# Patient Record
Sex: FEMALE | Race: BLACK OR AFRICAN AMERICAN | Employment: UNEMPLOYED | URBAN - METROPOLITAN AREA
[De-identification: names, ages, dates, MRNs, and addresses within clinical notes are randomized per-mention and may not be internally consistent; named-entity substitution may affect disease eponyms.]

---

## 2017-02-10 ENCOUNTER — OFFICE VISIT (OUTPATIENT)
Dept: PAIN MANAGEMENT | Age: 42
End: 2017-02-10

## 2017-02-10 VITALS
BODY MASS INDEX: 39.4 KG/M2 | HEIGHT: 69 IN | SYSTOLIC BLOOD PRESSURE: 138 MMHG | WEIGHT: 266 LBS | HEART RATE: 98 BPM | DIASTOLIC BLOOD PRESSURE: 87 MMHG

## 2017-02-10 DIAGNOSIS — M54.41 CHRONIC MIDLINE LOW BACK PAIN WITH BILATERAL SCIATICA: Primary | ICD-10-CM

## 2017-02-10 DIAGNOSIS — M54.42 CHRONIC MIDLINE LOW BACK PAIN WITH BILATERAL SCIATICA: Primary | ICD-10-CM

## 2017-02-10 DIAGNOSIS — M25.561 CHRONIC PAIN OF BOTH KNEES: ICD-10-CM

## 2017-02-10 DIAGNOSIS — M22.42 CHONDROMALACIA PATELLAE, LEFT: ICD-10-CM

## 2017-02-10 DIAGNOSIS — G89.4 CHRONIC PAIN SYNDROME: ICD-10-CM

## 2017-02-10 DIAGNOSIS — M25.562 CHRONIC PAIN OF BOTH KNEES: ICD-10-CM

## 2017-02-10 DIAGNOSIS — G89.29 CHRONIC PAIN OF BOTH KNEES: ICD-10-CM

## 2017-02-10 DIAGNOSIS — G89.29 CHRONIC MIDLINE LOW BACK PAIN WITH BILATERAL SCIATICA: Primary | ICD-10-CM

## 2017-02-10 DIAGNOSIS — G89.29 CHRONIC PAIN OF LEFT KNEE: ICD-10-CM

## 2017-02-10 DIAGNOSIS — M25.562 CHRONIC PAIN OF LEFT KNEE: ICD-10-CM

## 2017-02-10 RX ORDER — METHOCARBAMOL 500 MG/1
500 TABLET, FILM COATED ORAL 3 TIMES DAILY
Qty: 90 TAB | Refills: 3 | Status: SHIPPED | OUTPATIENT
Start: 2017-02-10 | End: 2017-04-10 | Stop reason: SDUPTHER

## 2017-02-10 RX ORDER — TEMAZEPAM 15 MG/1
15 CAPSULE ORAL
Qty: 30 CAP | Refills: 3 | Status: SHIPPED | OUTPATIENT
Start: 2017-03-08 | End: 2017-04-10 | Stop reason: SDUPTHER

## 2017-02-10 RX ORDER — AMITRIPTYLINE HYDROCHLORIDE 25 MG/1
TABLET, FILM COATED ORAL
Qty: 30 TAB | Refills: 3 | Status: SHIPPED | OUTPATIENT
Start: 2017-03-08 | End: 2017-06-12 | Stop reason: SDUPTHER

## 2017-02-10 RX ORDER — TEMAZEPAM 15 MG/1
15 CAPSULE ORAL
Qty: 60 CAP | Refills: 3 | Status: SHIPPED | OUTPATIENT
Start: 2017-03-08 | End: 2017-02-10 | Stop reason: SDUPTHER

## 2017-02-10 NOTE — PATIENT INSTRUCTIONS
1. Continue current plan with no evidence of addiction or diversion. Stable on current medication without adverse events. 2. Refill Opana ER 20 mg 3 times daily. 3. Refill Nucynta 75 mg up to 3 times daily as needed. 4. Refill Restoril 15 mg capsule once nightly as needed for sleep. With 3 refills  5. Refill Elavil 25 mg tablet once nightly as needed. 3 refills. Do not use along with Nucynta  6. Refill Robaxin 500 mg tablet up to 3 times daily. 3 refills. 7. Previously prescribed naloxone rescue injector. 8. Discussed risks of addiction, dependency, and opioid induced hyperalgesia.    9. Return to clinic in 2 months

## 2017-02-10 NOTE — MR AVS SNAPSHOT
Visit Information Date & Time Provider Department Dept. Phone Encounter #  
 2/10/2017  8:40 AM Gabino Powell, [facility] [id number] Follow-up Instructions Return in about 2 months (around 4/10/2017). Upcoming Health Maintenance Date Due DTaP/Tdap/Td series (1 - Tdap) 11/23/1997 PAP AKA CERVICAL CYTOLOGY 11/23/1997 INFLUENZA AGE 9 TO ADULT 8/1/2016 Allergies as of 2/10/2017  Review Complete On: 2/10/2017 By: SHAGUFTA Cavazos No Known Allergies Current Immunizations  Never Reviewed No immunizations on file. Not reviewed this visit You Were Diagnosed With   
  
 Codes Comments Chronic midline low back pain with bilateral sciatica    -  Primary ICD-10-CM: M54.41, M54.42, G89.29 ICD-9-CM: 724.2, 724.3, 338.29 Chronic pain of both knees     ICD-10-CM: M25.561, M25.562, G89.29 ICD-9-CM: 719.46, 338.29 Chondromalacia patellae, left     ICD-10-CM: M22.42 
ICD-9-CM: 717.7 Chronic pain syndrome     ICD-10-CM: G89.4 ICD-9-CM: 338. 4 Chronic pain of left knee     ICD-10-CM: M25.562, G89.29 ICD-9-CM: 719.46, 338.29 Vitals BP Pulse Height(growth percentile) Weight(growth percentile) LMP BMI  
 138/87 98 5' 9\" (1.753 m) 266 lb (120.7 kg) 07/23/2016 39.28 kg/m2 OB Status Smoking Status Hysterectomy Never Smoker Vitals History BMI and BSA Data Body Mass Index Body Surface Area  
 39.28 kg/m 2 2.42 m 2 Preferred Pharmacy Pharmacy Name Phone [facility] [address] [facility] [place] [address] Your Updated Medication List  
  
   
This list is accurate as of: 2/10/17  9:17 AM.  Always use your most recent med list.  
  
  
  
  
 amitriptyline 25 mg tablet Commonly known as:  ELAVIL TAKE 1 TABLET BY MOUTH EVERY NIGHT Start taking on:  3/8/2017  
  
 bisacodyl 10 mg suppository Commonly known as:  DULCOLAX Insert 10 mg into rectum daily. enoxaparin 40 mg/0.4 mL Commonly known as:  LOVENOX  
0.4 mL by SubCUTAneous route every twenty-four (24) hours. gabapentin 300 mg capsule Commonly known as:  NEURONTIN Take 300 mg by mouth three (3) times daily. ibuprofen 600 mg tablet Commonly known as:  MOTRIN Take 1 Tab by mouth every six (6) hours as needed. losartan-hydroCHLOROthiazide 100-25 mg per tablet Commonly known as:  HYZAAR Take 1 Tab by mouth daily. methocarbamol 500 mg tablet Commonly known as:  ROBAXIN Take 1 Tab by mouth three (3) times daily for 30 days. NIFEdipine ER 30 mg ER tablet Commonly known as:  ADALAT CC Take  by mouth daily. oxyCODONE-acetaminophen 5-325 mg per tablet Commonly known as:  PERCOCET Take 1-2 Tabs by mouth every four (4) hours as needed. Max Daily Amount: 12 Tabs. * oxyMORphone 20 mg ER tablet Commonly known as:  OPANA ER Take 1 Tab by mouth every eight (8) hours for 30 days. Max Daily Amount: 60 mg.  
  
 * oxyMORphone 20 mg ER tablet Commonly known as:  OPANA ER Take 1 Tab by mouth every eight (8) hours for 30 days. Max Daily Amount: 60 mg. Start taking on:  3/12/2017 RANEXA 500 mg SR tablet Generic drug:  ranolazine ER Take 1,000 mg by mouth two (2) times a day. * tapentadol 75 mg tablet Commonly known as:  Yreka Mess Take 75 mg by mouth three (3) times daily as needed for Pain for up to 30 days. Max Daily Amount: 225 mg.  
  
 * tapentadol 75 mg tablet Commonly known as:  Yreka Mess Take 75 mg by mouth three (3) times daily as needed for Pain for up to 30 days. Max Daily Amount: 225 mg. Start taking on:  3/12/2017  
  
 temazepam 15 mg capsule Commonly known as:  RESTORIL Take 1 Cap by mouth nightly as needed for Sleep for up to 30 days. Max Daily Amount: 15 mg. Start taking on:  3/8/2017 VITAMIN D2 50,000 unit capsule Generic drug:  ergocalciferol Take 50,000 Units by mouth every seven (7) days. * Notice: This list has 4 medication(s) that are the same as other medications prescribed for you. Read the directions carefully, and ask your doctor or other care provider to review them with you. Prescriptions Printed Refills  
 oxyMORphone (OPANA ER) 20 mg PO ER tablet 0 Sig: Take 1 Tab by mouth every eight (8) hours for 30 days. Max Daily Amount: 60 mg.  
 Class: Print Route: Oral  
 oxyMORphone (OPANA ER) 20 mg PO ER tablet 0 Starting on: 3/12/2017 Sig: Take 1 Tab by mouth every eight (8) hours for 30 days. Max Daily Amount: 60 mg.  
 Class: Print Route: Oral  
 tapentadol (NUCYNTA) 75 mg tablet 0 Sig: Take 75 mg by mouth three (3) times daily as needed for Pain for up to 30 days. Max Daily Amount: 225 mg.  
 Class: Print Route: Oral  
 tapentadol (NUCYNTA) 75 mg tablet 0 Starting on: 3/12/2017 Sig: Take 75 mg by mouth three (3) times daily as needed for Pain for up to 30 days. Max Daily Amount: 225 mg.  
 Class: Print Route: Oral  
 temazepam (RESTORIL) 15 mg capsule 3 Starting on: 3/8/2017 Sig: Take 1 Cap by mouth nightly as needed for Sleep for up to 30 days. Max Daily Amount: 15 mg.  
 Class: Print Route: Oral  
  
Prescriptions Sent to Pharmacy Refills  
 amitriptyline (ELAVIL) 25 mg tablet 3 Starting on: 3/8/2017 Sig: TAKE 1 TABLET BY MOUTH EVERY NIGHT Class: Normal  
 Pharmacy: [address] [place] [address], [address] [place] [address] Ph #: [phone]  
 methocarbamol (ROBAXIN) 500 mg tablet 3 Sig: Take 1 Tab by mouth three (3) times daily for 30 days. Class: Normal  
 Pharmacy: [facility] [address] [place] [address], [address] [place] [address] Ph #: [phone] Route: Oral  
  
Follow-up Instructions Return in about 2 months (around 4/10/2017). Patient Instructions 1. Continue current plan with no evidence of addiction or diversion. Stable on current medication without adverse events. 2. Refill Opana ER 20 mg 3 times daily. 3. Refill Nucynta 75 mg up to 3 times daily as needed. 4. Refill Restoril 15 mg capsule once nightly as needed for sleep. With 3 refills 5. Refill Elavil 25 mg tablet once nightly as needed. 3 refills. Do not use along with Nucynta 6. Refill Robaxin 500 mg tablet up to 3 times daily. 3 refills. 7. Previously prescribed naloxone rescue injector. 8. Discussed risks of addiction, dependency, and opioid induced hyperalgesia. 9. Return to clinic in 2 months Introducing [facility]! [employer] introduces [employer] patient portal. Now you can access parts of your medical record, email your doctor's office, and request medication refills online. 1. In your internet browser, go to [email or web address] 2. Click on the First Time User? Click Here link in the Sign In box. You will see the New Member Sign Up page. 3. Enter your [employer] Access Code exactly as it appears below. You will not need to use this code after youve completed the sign-up process. If you do not sign up before the expiration date, you must request a new code. · [employer] Access Code: [email or web address] Expires: 5/11/2017  9:17 AM 
 
4. Enter the last four digits of your Social Security Number (xxxx) and Date of Birth (mm/dd/yyyy) as indicated and click Submit. You will be taken to the next sign-up page. 5. Create a [employer] ID. This will be your [employer] login ID and cannot be changed, so think of one that is secure and easy to remember. 6. Create a [employer] password. You can change your password at any time. 7. Enter your Password Reset Question and Answer. This can be used at a later time if you forget your password. 8. Enter your e-mail address. You will receive e-mail notification when new information is available in [address] 9. Click Sign Up. You can now view and download portions of your medical record. 10. Click the Download Summary menu link to download a portable copy of your medical information. If you have questions, please visit the Frequently Asked Questions section of the [employer] website. Remember, [employer] is NOT to be used for urgent needs. For medical emergencies, dial 911. Now available from your iPhone and Android! Please provide this summary of care documentation to your next provider. Your primary care clinician is listed as Shamar Naranjo. If you have any questions after today's visit, please call [phone].

## 2017-02-10 NOTE — PROGRESS NOTES
Nursing Notes    Patient presents to the office today in follow-up. Patient rates her pain at 8/10 on the numerical pain scale. Reviewed medications with counts as follows:    Rx Date filled Qty Dispensed Pill Count Last Dose Short   nucynta 75 mg  01/10/17 90 0 This a.m. no   opana ER 20 mg 01/10/17 90 0 This a.m. no                            POC UDS was not performed in office today    Any new labs or imaging since last appointment? NO    Have you been to an emergency room (ER) or urgent care clinic since your last visit? NO            Have you been hospitalized since your last visit? NO     If yes, where, when, and reason for visit? Have you seen or consulted any other health care providers outside of the [address] [place] [address]  since your last visit? NO     If yes, where, when, and reason for visit? HM deferred to pcp.

## 2017-02-10 NOTE — PROGRESS NOTES
HISTORY OF PRESENT ILLNESS  Charito Crane is a 37 y.o. female    HPI: Ms. Red Izquierdo  returns today for f/u of chronic left knee pain. 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 The pain started after a fall, 2010. She underwent one surgery, ACL reconstruction, around . She is doing well with current treatment. No new complaints today. We will continue with no changes. I will have her follow-up in 2 months for reassessment. She does report that she had a cyst removed in her right upper extremity. She received Percocet and says this was disclosed to our practice. I do not see a note in her chart. Extensive counseling was provided. Explained her that she must disclose all outside prescriptions to our office within 72 hours. She verbally agrees. Current medication management consists of Opana ER 20 mg every 8 hours, Nucynta IR 75 mg 3 times a day as needed, Elavil 25 mg at night, and Restoril 30 mg at night. Medications are helping with pain control and quality of life. Her pain is 6-7/10 with medication and 10/10 without. Pt describes pain as aching. Aggravating factors include standing and walking. Relieved with rest, medication, and avoiding painful activities. Current treatment is helping to improve general activity, mood, walking, sleep, enjoyment of life    She  is otherwise doing well with no other complaints today. She denies any adverse events including nausea, vomiting, dizziness, increased constipation, hallucinations, or seizures. Attended education class on 2016    PRIOR IMAGIN. Lumbar MRI 16: Multilevel degenerative disc disease and facet arthropathy. Moderate canal narrowing and moderate bilateral neural foramina at L3-L4. L4-L5 moderate bilateral neural foraminal narrowing with mild canal narrowing.        No Known Allergies    Past Surgical History   Procedure Laterality Date    Hx acl reconstruction      Hx knee arthroscopy      Hx hernia repair           Review of Systems   Constitutional: Negative for chills and fever. HENT: Negative for congestion and sore throat. Eyes: Negative for blurred vision and double vision. Respiratory: Positive for shortness of breath. Negative for cough and wheezing. Cardiovascular: Positive for chest pain and leg swelling. Negative for palpitations. Gastrointestinal: Positive for heartburn. Negative for abdominal pain, constipation, nausea and vomiting. Genitourinary: Negative for dysuria and urgency. Musculoskeletal: Positive for back pain and joint pain. Skin: Negative for itching and rash. Neurological: Positive for weakness and headaches. Negative for dizziness, seizures and loss of consciousness. Endo/Heme/Allergies: Negative for environmental allergies. Does not bruise/bleed easily. Psychiatric/Behavioral: Positive for depression. Negative for suicidal ideas. The patient is nervous/anxious and has insomnia. Physical Exam   Constitutional: She is oriented to person, place, and time and well-developed, well-nourished, and in no distress. No distress. obese   HENT:   Head: Normocephalic and atraumatic. Eyes: EOM are normal.   Neck: Normal range of motion. Pulmonary/Chest: Effort normal.   Musculoskeletal: Normal range of motion. Neurological: She is alert and oriented to person, place, and time. Gait ( antalgic gait) abnormal.   Skin: Skin is warm and dry. No rash noted. She is not diaphoretic. No erythema. Psychiatric: Mood, memory, affect and judgment normal.   Nursing note and vitals reviewed. ASSESSMENT:    1. Chronic midline low back pain with bilateral sciatica    2. Chronic pain of both knees    3. Chondromalacia patellae, left    4. Chronic pain syndrome    5.  Chronic pain of left knee           [place] Prescription Monitoring Program was reviewed which does not demonstrate aberrancies and/or inconsistencies with regard to the historical prescribing of controlled medications to this patient by other providers. NOTE: Disclosed Percocet filled 7/28/16 for 5 days for postsurgical pain. Discussed percocet filled 1/5/17 for post procedural pain. PLAN / Pt Instructions:  1. Continue current plan with no evidence of addiction or diversion. Stable on current medication without adverse events. 2. Refill Opana ER 20 mg 3 times daily. 3. Refill Nucynta 75 mg up to 3 times daily as needed. 4. Refill Restoril 15 mg capsule once nightly as needed for sleep. With 3 refills  5. Refill Elavil 25 mg tablet once nightly as needed. 3 refills. Do not use along with Nucynta  6. Refill Robaxin 500 mg tablet up to 3 times daily. 3 refills. 7. Previously prescribed naloxone rescue injector. 8. Discussed risks of addiction, dependency, and opioid induced hyperalgesia. 9. Return to clinic in 2 months     Medications Ordered Today   Medications    oxyMORphone (OPANA ER) 20 mg PO ER tablet     Sig: Take 1 Tab by mouth every eight (8) hours for 30 days. Max Daily Amount: 60 mg. Dispense:  90 Tab     Refill:  0    oxyMORphone (OPANA ER) 20 mg PO ER tablet     Sig: Take 1 Tab by mouth every eight (8) hours for 30 days. Max Daily Amount: 60 mg. Dispense:  90 Tab     Refill:  0    tapentadol (NUCYNTA) 75 mg tablet     Sig: Take 75 mg by mouth three (3) times daily as needed for Pain for up to 30 days. Max Daily Amount: 225 mg. Dispense:  90 Tab     Refill:  0    tapentadol (NUCYNTA) 75 mg tablet     Sig: Take 75 mg by mouth three (3) times daily as needed for Pain for up to 30 days. Max Daily Amount: 225 mg.      Dispense:  90 Tab     Refill:  0    amitriptyline (ELAVIL) 25 mg tablet     Sig: TAKE 1 TABLET BY MOUTH EVERY NIGHT     Dispense:  30 Tab     Refill:  3    DISCONTD: temazepam (RESTORIL) 15 mg capsule     Sig: Take 1 Cap by mouth nightly as needed for Sleep for up to 30 days. Max Daily Amount: 15 mg. Dispense:  60 Cap     Refill:  3    methocarbamol (ROBAXIN) 500 mg tablet     Sig: Take 1 Tab by mouth three (3) times daily for 30 days. Dispense:  90 Tab     Refill:  3    temazepam (RESTORIL) 15 mg capsule     Sig: Take 1 Cap by mouth nightly as needed for Sleep for up to 30 days. Max Daily Amount: 15 mg. Dispense:  30 Cap     Refill:  3       Pain medications prescribed with the objective of pain relief and improved physical and psychosocial function in this patient. Spent 25 minutes with patient today reviewing the treatment plan, goals of treatment plan, and limitations of the treatment plan, to include the potential for side effects from medications and procedures. Jonathan Frazier 2/10/2017      Note: Please excuse any typographical errors. Voice recognition software was used for this note and may cause mistakes.

## 2017-03-08 RX ORDER — NALOXONE HYDROCHLORIDE 4 MG/.1ML
4 SPRAY NASAL AS NEEDED
Qty: 1 BOX | Refills: 1 | Status: SHIPPED | OUTPATIENT
Start: 2017-03-08 | End: 2019-01-04 | Stop reason: SDUPTHER

## 2017-03-08 NOTE — TELEPHONE ENCOUNTER
The pt's pharmacy called the office about the prescription for narcan the pt was issued back in August 2016. They have still yet to get this medication approved for the pt. The pt's insurance will cover the narcan nasal spray. This was brought to the provider for review and he stated that we can try and order this for the pt.

## 2017-03-10 ENCOUNTER — DOCUMENTATION ONLY (OUTPATIENT)
Dept: PAIN MANAGEMENT | Age: 42
End: 2017-03-10

## 2017-04-10 ENCOUNTER — OFFICE VISIT (OUTPATIENT)
Dept: PAIN MANAGEMENT | Age: 42
End: 2017-04-10

## 2017-04-10 VITALS
DIASTOLIC BLOOD PRESSURE: 86 MMHG | SYSTOLIC BLOOD PRESSURE: 117 MMHG | BODY MASS INDEX: 39.28 KG/M2 | HEART RATE: 92 BPM | WEIGHT: 266 LBS

## 2017-04-10 DIAGNOSIS — M22.42 CHONDROMALACIA PATELLAE, LEFT: ICD-10-CM

## 2017-04-10 DIAGNOSIS — G89.29 CHRONIC PAIN OF BOTH KNEES: ICD-10-CM

## 2017-04-10 DIAGNOSIS — G89.4 CHRONIC PAIN SYNDROME: ICD-10-CM

## 2017-04-10 DIAGNOSIS — M25.562 CHRONIC PAIN OF LEFT KNEE: ICD-10-CM

## 2017-04-10 DIAGNOSIS — G89.29 CHRONIC PAIN OF LEFT KNEE: ICD-10-CM

## 2017-04-10 DIAGNOSIS — M25.562 CHRONIC PAIN OF BOTH KNEES: ICD-10-CM

## 2017-04-10 DIAGNOSIS — M25.561 CHRONIC PAIN OF BOTH KNEES: ICD-10-CM

## 2017-04-10 RX ORDER — TOPIRAMATE 50 MG/1
TABLET, FILM COATED ORAL 2 TIMES DAILY
COMMUNITY
End: 2019-01-04

## 2017-04-10 RX ORDER — METHOCARBAMOL 500 MG/1
500 TABLET, FILM COATED ORAL 3 TIMES DAILY
Qty: 90 TAB | Refills: 3 | Status: SHIPPED | OUTPATIENT
Start: 2017-05-10 | End: 2017-08-10 | Stop reason: SDUPTHER

## 2017-04-10 RX ORDER — TEMAZEPAM 15 MG/1
15 CAPSULE ORAL
Qty: 30 CAP | Refills: 3 | Status: SHIPPED | OUTPATIENT
Start: 2017-05-04 | End: 2017-08-10 | Stop reason: SDUPTHER

## 2017-04-10 RX ORDER — SERTRALINE HYDROCHLORIDE 50 MG/1
TABLET, FILM COATED ORAL DAILY
COMMUNITY
End: 2019-01-04

## 2017-04-10 NOTE — PATIENT INSTRUCTIONS
1. Continue current plan with no evidence of addiction or diversion. Stable on current medication without adverse events. 2. Refill Opana ER 20 mg 3 times daily. 3. Refill Nucynta 75 mg up to 3 times daily as needed. 4. Refill Restoril 15 mg capsule once nightly as needed for sleep. With 3 refills  5. Refill Elavil 25 mg tablet once nightly as needed. 3 refills. Do not use along with Nucynta  6. Refill Robaxin 500 mg tablet up to 3 times daily. 3 refills. 7. Previously prescribed naloxone rescue injector. 8. Referral to Orthopedic specialist for worsening left knee pain. (second opinion)  9. Discussed risks of addiction, dependency, and opioid induced hyperalgesia. 10. Return to clinic in 2 months           Knee Pain or Injury: Care Instructions  Your Care Instructions    Injuries are a common cause of knee problems. Sudden (acute) injuries may be caused by a direct blow to the knee. They can also be caused by abnormal twisting, bending, or falling on the knee. Pain, bruising, or swelling may be severe, and may start within minutes of the injury. Overuse is another cause of knee pain. Other causes are climbing stairs, kneeling, and other activities that use the knee. Everyday wear and tear, especially as you get older, also can cause knee pain. Rest, along with home treatment, often relieves pain and allows your knee to heal. If you have a serious knee injury, you may need tests and treatment. Follow-up care is a key part of your treatment and safety. Be sure to make and go to all appointments, and call your doctor if you are having problems. It's also a good idea to know your test results and keep a list of the medicines you take. How can you care for yourself at home? · Be safe with medicines. Read and follow all instructions on the label. ¨ If the doctor gave you a prescription medicine for pain, take it as prescribed.   ¨ If you are not taking a prescription pain medicine, ask your doctor if you can take an over-the-counter medicine. · Rest and protect your knee. Take a break from any activity that may cause pain. · Put ice or a cold pack on your knee for 10 to 20 minutes at a time. Put a thin cloth between the ice and your skin. · Prop up a sore knee on a pillow when you ice it or anytime you sit or lie down for the next 3 days. Try to keep it above the level of your heart. This will help reduce swelling. · If your knee is not swollen, you can put moist heat, a heating pad, or a warm cloth on your knee. · If your doctor recommends an elastic bandage, sleeve, or other type of support for your knee, wear it as directed. · Follow your doctor's instructions about how much weight you can put on your leg. Use a cane, crutches, or a walker as instructed. · Follow your doctor's instructions about activity during your healing process. If you can do mild exercise, slowly increase your activity. · Reach and stay at a healthy weight. Extra weight can strain the joints, especially the knees and hips, and make the pain worse. Losing even a few pounds may help. When should you call for help? Call 911 anytime you think you may need emergency care. For example, call if:  · You have symptoms of a blood clot in your lung (called a pulmonary embolism). These may include:  ¨ Sudden chest pain. ¨ Trouble breathing. ¨ Coughing up blood. Call your doctor now or seek immediate medical care if:  · You have severe or increasing pain. · Your leg or foot turns cold or changes color. · You cannot stand or put weight on your knee. · Your knee looks twisted or bent out of shape. · You cannot move your knee. · You have signs of infection, such as:  ¨ Increased pain, swelling, warmth, or redness. ¨ Red streaks leading from the knee. ¨ Pus draining from a place on your knee. ¨ A fever.   · You have signs of a blood clot in your leg (called a deep vein thrombosis), such as:  ¨ Pain in your calf, back of the knee, thigh, or groin. ¨ Redness and swelling in your leg or groin. Watch closely for changes in your health, and be sure to contact your doctor if:  · You have tingling, weakness, or numbness in your knee. · You have any new symptoms, such as swelling. · You have bruises from a knee injury that last longer than 2 weeks. · You do not get better as expected. Where can you learn more? Go to http://delgado-carlota.info/. Enter K195 in the search box to learn more about \"Knee Pain or Injury: Care Instructions. \"  Current as of: May 27, 2016  Content Version: 11.2  © 4925-9927 [employer]. Care instructions adapted under license by [employer] (which disclaims liability or warranty for this information). If you have questions about a medical condition or this instruction, always ask your healthcare professional. [place] [address] any warranty or liability for your use of this information.

## 2017-04-10 NOTE — MR AVS SNAPSHOT
Visit Information Date & Time Provider Department Dept. Phone Encounter #  
 4/10/2017  8:40 AM SHAGUFTA Dunlap [address] [facility] [phone] Follow-up Instructions Return in about 2 months (around 6/10/2017). Upcoming Health Maintenance Date Due DTaP/Tdap/Td series (1 - Tdap) 11/23/1997 PAP AKA CERVICAL CYTOLOGY 11/23/1997 INFLUENZA AGE 9 TO ADULT 8/1/2016 Allergies as of 4/10/2017  Review Complete On: 4/10/2017 By: SHAGUFTA Dunlap No Known Allergies Current Immunizations  Never Reviewed No immunizations on file. Not reviewed this visit You Were Diagnosed With   
  
 Codes Comments Chronic pain of left knee     ICD-10-CM: M25.562, G89.29 ICD-9-CM: 719.46, 338.29 Chronic pain syndrome     ICD-10-CM: G89.4 ICD-9-CM: 338. 4 Chondromalacia patellae, left     ICD-10-CM: M22.42 
ICD-9-CM: 717.7 Chronic pain of both knees     ICD-10-CM: M25.561, M25.562, G89.29 ICD-9-CM: 719.46, 338.29 Vitals BP Pulse Weight(growth percentile) LMP BMI OB Status 117/86 92 266 lb (120.7 kg) 07/23/2016 39.28 kg/m2 Hysterectomy Smoking Status Never Smoker BMI and BSA Data Body Mass Index Body Surface Area  
 39.28 kg/m 2 2.42 m 2 Preferred Pharmacy Pharmacy Name Phone [facility] [address] [place] [address] Your Updated Medication List  
  
   
This list is accurate as of: 4/10/17  9:40 AM.  Always use your most recent med list.  
  
  
  
  
 amitriptyline 25 mg tablet Commonly known as:  ELAVIL TAKE 1 TABLET BY MOUTH EVERY NIGHT  
  
 bisacodyl 10 mg suppository Commonly known as:  DULCOLAX Insert 10 mg into rectum daily. enoxaparin 40 mg/0.4 mL Commonly known as:  LOVENOX  
0.4 mL by SubCUTAneous route every twenty-four (24) hours. gabapentin 300 mg capsule Commonly known as:  NEURONTIN Take 300 mg by mouth three (3) times daily. ibuprofen 600 mg tablet Commonly known as:  MOTRIN Take 1 Tab by mouth every six (6) hours as needed. losartan-hydroCHLOROthiazide 100-25 mg per tablet Commonly known as:  HYZAAR Take 1 Tab by mouth daily. methocarbamol 500 mg tablet Commonly known as:  ROBAXIN Take 1 Tab by mouth three (3) times daily for 30 days. Start taking on:  5/10/2017  
  
 naloxone 4 mg/actuation Spry Commonly known as:  NARCAN  
4 mg by Nasal route as needed. Spray contents of 1 nasal spray in nostril as one single dose. May repeat in 2-3 minutes in alternating nostril until EMS arrives. Indications: OPIATE-INDUCED RESPIRATORY DEPRESSION  
  
 NIFEdipine ER 30 mg ER tablet Commonly known as:  ADALAT CC Take  by mouth daily. oxyCODONE-acetaminophen 5-325 mg per tablet Commonly known as:  PERCOCET Take 1-2 Tabs by mouth every four (4) hours as needed. Max Daily Amount: 12 Tabs. * oxyMORphone 20 mg ER tablet Commonly known as:  OPANA ER Take 1 Tab by mouth every eight (8) hours for 30 days. Max Daily Amount: 60 mg. Start taking on:  4/11/2017 * oxyMORphone 20 mg ER tablet Commonly known as:  OPANA ER Take 1 Tab by mouth every eight (8) hours for 30 days. Max Daily Amount: 60 mg. Start taking on:  5/11/2017 RANEXA 500 mg SR tablet Generic drug:  ranolazine ER Take 1,000 mg by mouth two (2) times a day. * tapentadol 75 mg tablet Commonly known as:  Susen Debbie Take 75 mg by mouth three (3) times daily as needed for Pain for up to 30 days. Max Daily Amount: 225 mg. Start taking on:  4/11/2017 * tapentadol 75 mg tablet Commonly known as:  Susen Debbie Take 75 mg by mouth three (3) times daily as needed for Pain for up to 30 days. Max Daily Amount: 225 mg. Start taking on:  5/11/2017  
  
 temazepam 15 mg capsule Commonly known as:  RESTORIL  
 Take 1 Cap by mouth nightly as needed for Sleep for up to 30 days. Max Daily Amount: 15 mg. Start taking on:  5/4/2017 TOPAMAX 50 mg tablet Generic drug:  topiramate Take  by mouth two (2) times a day. VITAMIN D2 50,000 unit capsule Generic drug:  ergocalciferol Take 50,000 Units by mouth every seven (7) days. ZOLOFT 50 mg tablet Generic drug:  sertraline Take  by mouth daily. * Notice: This list has 4 medication(s) that are the same as other medications prescribed for you. Read the directions carefully, and ask your doctor or other care provider to review them with you. Prescriptions Printed Refills  
 oxyMORphone (OPANA ER) 20 mg PO ER tablet 0 Starting on: 4/11/2017 Sig: Take 1 Tab by mouth every eight (8) hours for 30 days. Max Daily Amount: 60 mg.  
 Class: Print Route: Oral  
 oxyMORphone (OPANA ER) 20 mg PO ER tablet 0 Starting on: 5/11/2017 Sig: Take 1 Tab by mouth every eight (8) hours for 30 days. Max Daily Amount: 60 mg.  
 Class: Print Route: Oral  
 tapentadol (NUCYNTA) 75 mg tablet 0 Starting on: 4/11/2017 Sig: Take 75 mg by mouth three (3) times daily as needed for Pain for up to 30 days. Max Daily Amount: 225 mg.  
 Class: Print Route: Oral  
 tapentadol (NUCYNTA) 75 mg tablet 0 Starting on: 5/11/2017 Sig: Take 75 mg by mouth three (3) times daily as needed for Pain for up to 30 days. Max Daily Amount: 225 mg.  
 Class: Print Route: Oral  
 temazepam (RESTORIL) 15 mg capsule 3 Starting on: 5/4/2017 Sig: Take 1 Cap by mouth nightly as needed for Sleep for up to 30 days. Max Daily Amount: 15 mg.  
 Class: Print Route: Oral  
  
Prescriptions Sent to Pharmacy Refills  
 methocarbamol (ROBAXIN) 500 mg tablet 3 Starting on: 5/10/2017 Sig: Take 1 Tab by mouth three (3) times daily for 30 days.   
 Class: Normal  
 Pharmacy: NEELAM SERNA JR. [facility] [address], [address] [place] [facility] #: [phone] Route: Oral  
  
We Performed the Following REFERRAL TO ORTHOPEDIC SURGERY [REF62 Custom] Comments:  
 Please evaluate patient for chronic worsening left knee pain Follow-up Instructions Return in about 2 months (around 6/10/2017). Referral Information Referral ID Referred By Referred To  
  
 [address], [address], [place]   
   [address] [place], [address] Phone: [phone] Fax: [phone] Visits Status Start Date End Date 1 New Request 4/10/17 4/10/18 If your referral has a status of pending review or denied, additional information will be sent to support the outcome of this decision. Patient Instructions 1. Continue current plan with no evidence of addiction or diversion. Stable on current medication without adverse events. 2. Refill Opana ER 20 mg 3 times daily. 3. Refill Nucynta 75 mg up to 3 times daily as needed. 4. Refill Restoril 15 mg capsule once nightly as needed for sleep. With 3 refills 5. Refill Elavil 25 mg tablet once nightly as needed. 3 refills. Do not use along with Nucynta 6. Refill Robaxin 500 mg tablet up to 3 times daily. 3 refills. 7. Previously prescribed naloxone rescue injector. 8. Referral to Orthopedic specialist for worsening left knee pain. (second opinion) 9. Discussed risks of addiction, dependency, and opioid induced hyperalgesia. 10. Return to clinic in 2 months Knee Pain or Injury: Care Instructions Your Care Instructions Injuries are a common cause of knee problems. Sudden (acute) injuries may be caused by a direct blow to the knee. They can also be caused by abnormal twisting, bending, or falling on the knee. Pain, bruising, or swelling may be severe, and may start within minutes of the injury. Overuse is another cause of knee pain.  Other causes are climbing stairs, kneeling, and other activities that use the knee. Everyday wear and tear, especially as you get older, also can cause knee pain. Rest, along with home treatment, often relieves pain and allows your knee to heal. If you have a serious knee injury, you may need tests and treatment. Follow-up care is a key part of your treatment and safety. Be sure to make and go to all appointments, and call your doctor if you are having problems. It's also a good idea to know your test results and keep a list of the medicines you take. How can you care for yourself at home? · Be safe with medicines. Read and follow all instructions on the label. ¨ If the doctor gave you a prescription medicine for pain, take it as prescribed. ¨ If you are not taking a prescription pain medicine, ask your doctor if you can take an over-the-counter medicine. · Rest and protect your knee. Take a break from any activity that may cause pain. · Put ice or a cold pack on your knee for 10 to 20 minutes at a time. Put a thin cloth between the ice and your skin. · Prop up a sore knee on a pillow when you ice it or anytime you sit or lie down for the next 3 days. Try to keep it above the level of your heart. This will help reduce swelling. · If your knee is not swollen, you can put moist heat, a heating pad, or a warm cloth on your knee. · If your doctor recommends an elastic bandage, sleeve, or other type of support for your knee, wear it as directed. · Follow your doctor's instructions about how much weight you can put on your leg. Use a cane, crutches, or a walker as instructed. · Follow your doctor's instructions about activity during your healing process. If you can do mild exercise, slowly increase your activity. · Reach and stay at a healthy weight. Extra weight can strain the joints, especially the knees and hips, and make the pain worse. Losing even a few pounds may help. When should you call for help? Call 911 anytime you think you may need emergency care. For example, call if: 
· You have symptoms of a blood clot in your lung (called a pulmonary embolism). These may include: 
¨ Sudden chest pain. ¨ Trouble breathing. ¨ Coughing up blood. Call your doctor now or seek immediate medical care if: 
· You have severe or increasing pain. · Your leg or foot turns cold or changes color. · You cannot stand or put weight on your knee. · Your knee looks twisted or bent out of shape. · You cannot move your knee. · You have signs of infection, such as: 
¨ Increased pain, swelling, warmth, or redness. ¨ Red streaks leading from the knee. ¨ Pus draining from a place on your knee. ¨ A fever. · You have signs of a blood clot in your leg (called a deep vein thrombosis), such as: 
¨ Pain in your calf, back of the knee, thigh, or groin. ¨ Redness and swelling in your leg or groin. Watch closely for changes in your health, and be sure to contact your doctor if: 
· You have tingling, weakness, or numbness in your knee. · You have any new symptoms, such as swelling. · You have bruises from a knee injury that last longer than 2 weeks. · You do not get better as expected. Where can you learn more? Go to http://delgado-carlota.info/. Enter K195 in the search box to learn more about \"Knee Pain or Injury: Care Instructions. \" Current as of: May 27, 2016 Content Version: 11.2 © 3654-8152 [employer] Care instructions adapted under license by [employer] (which disclaims liability or warranty for this information). If you have questions about a medical condition or this instruction, always ask your healthcare professional. [place] [address] any warranty or liability for your use of this information. Introducing [facility]!    
 [facility] introduces V[employer] patient portal. Now you can access parts of your medical record, email your doctor's office, and request medication refills online. 1. In your internet browser, go to [email or web address] 2. Click on the First Time User? Click Here link in the Sign In box. You will see the New Member Sign Up page. 3. Enter your [employer] Access Code exactly as it appears below. You will not need to use this code after youve completed the sign-up process. If you do not sign up before the expiration date, you must request a new code. · [employer] Access Code: [email or web address] Expires: 5/11/2017 10:17 AM 
 
4. Enter the last four digits of your Social Security Number (xxxx) and Date of Birth (mm/dd/yyyy) as indicated and click Submit. You will be taken to the next sign-up page. 5. Create a [employer] ID. This will be your [employer] login ID and cannot be changed, so think of one that is secure and easy to remember. 6. Create a [employer] password. You can change your password at any time. 7. Enter your Password Reset Question and Answer. This can be used at a later time if you forget your password. 8. Enter your e-mail address. You will receive e-mail notification when new information is available in [address] 9. Click Sign Up. You can now view and download portions of your medical record. 10. Click the Download Summary menu link to download a portable copy of your medical information. If you have questions, please visit the Frequently Asked Questions section of the [employer] website. Remember, [employer] is NOT to be used for urgent needs. For medical emergencies, dial 911. Now available from your iPhone and Android! Please provide this summary of care documentation to your next provider. Your primary care clinician is listed as Tammi Alaniz. If you have any questions after today's visit, please call [phone].

## 2017-04-10 NOTE — PROGRESS NOTES
HISTORY OF PRESENT ILLNESS  Ba Ramon is a 37 y.o. female    HPI: Ms. Wil Samuels  returns today for f/u of chronic left knee pain. 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 The pain started after a fall, 2010. She underwent one surgery, ACL reconstruction, around . She reports some worsening left knee pain. During exam there was significant fluid in her left knee. I recommended that she follow back up with orthopedic surgeon. She says that she does not want to see her previous surgeon again. I have recommended referral for second opinion. She verbally agrees. I will have her follow-up in 2 months for reassessment. Because the patient's current regimen places him/her at increased risk for possible overdose, a prescription for naloxone nasal spray has been provided. The patient understands that this medication is only to be used in the setting of a possible overdose and that inadvertent use of this medication could precipitate overt withdrawal.    Current medication management consists of Opana ER 20 mg every 8 hours, Nucynta IR 75 mg 3 times a day as needed, Elavil 25 mg at night, and Restoril 30 mg at night. Medications are helping with pain control and quality of life. Her pain is 6-7/10 with medication and 10/10 without. Pt describes pain as aching. Aggravating factors include standing and walking. Relieved with rest, medication, and avoiding painful activities. Current treatment is helping to improve general activity, mood, walking, sleep, enjoyment of life    She  is otherwise doing well with no other complaints today. She denies any adverse events including nausea, vomiting, dizziness, increased constipation, hallucinations, or seizures.        Attended education class on 2016    PRIOR IMAGIN. Lumbar MRI 16: Multilevel degenerative disc disease and facet arthropathy. Moderate canal narrowing and moderate bilateral neural foramina at L3-L4. L4-L5 moderate bilateral neural foraminal narrowing with mild canal narrowing. No Known Allergies    Past Surgical History:   Procedure Laterality Date    HX ACL RECONSTRUCTION      HX HERNIA REPAIR      HX KNEE ARTHROSCOPY           Review of Systems   Constitutional: Negative for chills and fever. HENT: Negative for congestion and sore throat. Eyes: Negative for blurred vision and double vision. Respiratory: Positive for shortness of breath. Negative for cough and wheezing. Cardiovascular: Positive for chest pain and leg swelling. Negative for palpitations. Gastrointestinal: Positive for heartburn. Negative for abdominal pain, constipation, nausea and vomiting. Genitourinary: Negative for dysuria and urgency. Musculoskeletal: Positive for back pain and joint pain. Skin: Negative for itching and rash. Neurological: Positive for weakness and headaches. Negative for dizziness, seizures and loss of consciousness. Endo/Heme/Allergies: Negative for environmental allergies. Does not bruise/bleed easily. Psychiatric/Behavioral: Positive for depression. Negative for suicidal ideas. The patient is nervous/anxious and has insomnia. Physical Exam   Constitutional: She is oriented to person, place, and time and well-developed, well-nourished, and in no distress. No distress. obese   HENT:   Head: Normocephalic and atraumatic. Eyes: EOM are normal.   Neck: Normal range of motion. Pulmonary/Chest: Effort normal.   Musculoskeletal: Normal range of motion. Neurological: She is alert and oriented to person, place, and time. Gait ( antalgic gait) abnormal.   Skin: Skin is warm and dry. No rash noted. She is not diaphoretic. No erythema. Psychiatric: Mood, memory, affect and judgment normal.   Nursing note and vitals reviewed. ASSESSMENT:    1.  Chronic pain of left knee 2. Chronic pain syndrome    3. Chondromalacia patellae, left    4. Chronic pain of both knees           [place] Prescription Monitoring Program was reviewed which does not demonstrate aberrancies and/or inconsistencies with regard to the historical prescribing of controlled medications to this patient by other providers. NOTE: Disclosed Percocet filled 7/28/16 for 5 days for postsurgical pain. Discussed percocet filled 1/5/17 for post procedural pain. PLAN / Pt Instructions:  1. Continue current plan with no evidence of addiction or diversion. Stable on current medication without adverse events. 2. Refill Opana ER 20 mg 3 times daily. 3. Refill Nucynta 75 mg up to 3 times daily as needed. 4. Refill Restoril 15 mg capsule once nightly as needed for sleep. With 3 refills  5. Refill Elavil 25 mg tablet once nightly as needed. 3 refills. Do not use along with Nucynta  6. Refill Robaxin 500 mg tablet up to 3 times daily. 3 refills. 7. Previously prescribed naloxone rescue injector. 8. Referral to Orthopedic specialist for worsening left knee pain. (second opinion)  9. Discussed risks of addiction, dependency, and opioid induced hyperalgesia. 10. Return to clinic in 2 months     Medications Ordered Today   Medications    oxyMORphone (OPANA ER) 20 mg PO ER tablet     Sig: Take 1 Tab by mouth every eight (8) hours for 30 days. Max Daily Amount: 60 mg. Dispense:  90 Tab     Refill:  0    oxyMORphone (OPANA ER) 20 mg PO ER tablet     Sig: Take 1 Tab by mouth every eight (8) hours for 30 days. Max Daily Amount: 60 mg. Dispense:  90 Tab     Refill:  0    tapentadol (NUCYNTA) 75 mg tablet     Sig: Take 75 mg by mouth three (3) times daily as needed for Pain for up to 30 days. Max Daily Amount: 225 mg. Dispense:  90 Tab     Refill:  0    tapentadol (NUCYNTA) 75 mg tablet     Sig: Take 75 mg by mouth three (3) times daily as needed for Pain for up to 30 days. Max Daily Amount: 225 mg. Dispense:  90 Tab     Refill:  0    temazepam (RESTORIL) 15 mg capsule     Sig: Take 1 Cap by mouth nightly as needed for Sleep for up to 30 days. Max Daily Amount: 15 mg. Dispense:  30 Cap     Refill:  3    methocarbamol (ROBAXIN) 500 mg tablet     Sig: Take 1 Tab by mouth three (3) times daily for 30 days. Dispense:  90 Tab     Refill:  3       Pain medications prescribed with the objective of pain relief and improved physical and psychosocial function in this patient. Spent 25 minutes with patient today reviewing the treatment plan, goals of treatment plan, and limitations of the treatment plan, to include the potential for side effects from medications and procedures. Jonathan Servin 4/10/2017      Note: Please excuse any typographical errors. Voice recognition software was used for this note and may cause mistakes.

## 2017-04-10 NOTE — PROGRESS NOTES
Nursing Notes    Patient presents to the office today in follow-up. Patient rates her pain at 7/10 on the numerical pain scale. Reviewed medications with counts as follows:    Rx Date filled Qty Dispensed Pill Count Last Dose Short   opana ER 20 3/12/17 90 8 4/10/17 no   restoril 15 4/4/17 30 22 4/9/17 no   nucynta 75 3/12/17 90 8 4/10/17 no       Comments: pt received cough syrup with hydrocodone back in mid march, advised on policy and she expressed understanding. POC UDS was not performed in office today    Any new labs or imaging since last appointment? YES, head CT with ED visit    Have you been to an emergency room (ER) or urgent care clinic since your last visit? YES     headache       Have you been hospitalized since your last visit? NO     If yes, where, when, and reason for visit? Have you seen or consulted any other health care providers outside of the [address] [place] [address]  since your last visit? YES   Juancho Dotson  If yes, where, when, and reason for visit? Ms. Cezar Morales has a reminder for a \"due or due soon\" health maintenance. I have asked that she contact her primary care provider for follow-up on this health maintenance.

## 2017-05-10 ENCOUNTER — DOCUMENTATION ONLY (OUTPATIENT)
Dept: PAIN MANAGEMENT | Age: 42
End: 2017-05-10

## 2017-05-10 NOTE — PROGRESS NOTES
Pharmacy notified per provider [facility]) that patient may fill prescriptions one day early (today) due to going out of town for .

## 2017-05-24 ENCOUNTER — TELEPHONE (OUTPATIENT)
Dept: PAIN MANAGEMENT | Age: 42
End: 2017-05-24

## 2017-06-12 ENCOUNTER — OFFICE VISIT (OUTPATIENT)
Dept: PAIN MANAGEMENT | Age: 42
End: 2017-06-12

## 2017-06-12 VITALS
HEIGHT: 69 IN | BODY MASS INDEX: 39.4 KG/M2 | SYSTOLIC BLOOD PRESSURE: 125 MMHG | WEIGHT: 266 LBS | HEART RATE: 106 BPM | DIASTOLIC BLOOD PRESSURE: 87 MMHG

## 2017-06-12 DIAGNOSIS — M25.562 CHRONIC PAIN OF BOTH KNEES: ICD-10-CM

## 2017-06-12 DIAGNOSIS — M25.562 CHRONIC PAIN OF LEFT KNEE: ICD-10-CM

## 2017-06-12 DIAGNOSIS — G89.29 CHRONIC PAIN OF BOTH KNEES: ICD-10-CM

## 2017-06-12 DIAGNOSIS — M25.561 CHRONIC PAIN OF BOTH KNEES: ICD-10-CM

## 2017-06-12 DIAGNOSIS — G89.29 CHRONIC PAIN OF LEFT KNEE: ICD-10-CM

## 2017-06-12 DIAGNOSIS — G89.4 CHRONIC PAIN SYNDROME: ICD-10-CM

## 2017-06-12 DIAGNOSIS — M22.42 CHONDROMALACIA PATELLAE, LEFT: ICD-10-CM

## 2017-06-12 RX ORDER — AMITRIPTYLINE HYDROCHLORIDE 25 MG/1
TABLET, FILM COATED ORAL
Qty: 30 TAB | Refills: 3 | Status: SHIPPED | OUTPATIENT
Start: 2017-06-12 | End: 2017-10-09 | Stop reason: SDUPTHER

## 2017-06-12 NOTE — PROGRESS NOTES
Nursing Notes    Patient presents to the office today in follow-up. Patient rates her pain at 8/10 on the numerical pain scale. Reviewed medications with counts as follows:    Rx Date filled Qty Dispensed Pill Count Last Dose Short   nucynta 75mg 05/11/17 90 0 yesterday    opana ER 20mg 05/10/17 90 0 yesterday                                   Comments:     POC UDS was not performed in office today    Any new labs or imaging since last appointment? NO    Have you been to an emergency room (ER) or urgent care clinic since your last visit? YES , [id number]           Have you been hospitalized since your last visit? NO     If yes, where, when, and reason for visit? Have you seen or consulted any other health care providers outside of the [address] [place] [address]  since your last visit? NO     If yes, where, when, and reason for visit? HM deferred to pcp.

## 2017-06-12 NOTE — PROGRESS NOTES
HISTORY OF PRESENT ILLNESS  Brenden Bravo is a 37 y.o. female    HPI: Ms. Donald Stein  returns today for f/u of chronic left knee pain. 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 The pain started after a fall, March 13, 2010. She underwent one surgery, ACL reconstruction, around July, 2010. She continues unchanged since last visit. She has not followed up with the orthopedic surgeon at this time. I have strongly encouraged her to make an appointment as soon as possible for possible knee aspiration. She has significant fluid in her left knee and very tender to touch  She says that she is planning to go to their office today to schedule an appointment. She is otherwise doing well with no other complaints today. I will have her follow-up in 2 months for further evaluation and recommendation. Current medication management consists of Opana ER 20 mg every 8 hours, Nucynta IR 75 mg 3 times a day as needed, Elavil 25 mg at night, and Restoril 30 mg at night. Medications are helping with pain control and quality of life. Her pain is 6-7/10 with medication and 10/10 without. Pt describes pain as aching. Aggravating factors include standing and walking. Relieved with rest, medication, and avoiding painful activities. Current treatment is helping to improve general activity, mood, walking, sleep, enjoyment of life    She  is otherwise doing well with no other complaints today. She denies any adverse events including nausea, vomiting, dizziness, increased constipation, hallucinations, or seizures. Because the patient's current regimen places him/her at increased risk for possible overdose, a prescription for naloxone nasal spray has been provided.   The patient understands that this medication is only to be used in the setting of a possible overdose and that inadvertent use of this medication could precipitate overt withdrawal.    Attended education class on 2016    PRIOR IMAGIN. Lumbar MRI 16: Multilevel degenerative disc disease and facet arthropathy. Moderate canal narrowing and moderate bilateral neural foramina at L3-L4. L4-L5 moderate bilateral neural foraminal narrowing with mild canal narrowing. No Known Allergies    Past Surgical History:   Procedure Laterality Date    HX ACL RECONSTRUCTION      HX HERNIA REPAIR      HX KNEE ARTHROSCOPY           Review of Systems   Constitutional: Negative for chills and fever. HENT: Negative for congestion and sore throat. Eyes: Negative for blurred vision and double vision. Respiratory: Positive for shortness of breath. Negative for cough and wheezing. Cardiovascular: Positive for chest pain and leg swelling. Negative for palpitations. Gastrointestinal: Positive for heartburn. Negative for abdominal pain, constipation, nausea and vomiting. Genitourinary: Negative for dysuria and urgency. Musculoskeletal: Positive for back pain and joint pain. Skin: Negative for itching and rash. Neurological: Positive for weakness and headaches. Negative for dizziness, seizures and loss of consciousness. Endo/Heme/Allergies: Negative for environmental allergies. Does not bruise/bleed easily. Psychiatric/Behavioral: Positive for depression. Negative for suicidal ideas. The patient is nervous/anxious and has insomnia. Physical Exam   Constitutional: She is oriented to person, place, and time and well-developed, well-nourished, and in no distress. No distress. obese   HENT:   Head: Normocephalic and atraumatic. Eyes: EOM are normal.   Neck: Normal range of motion. Pulmonary/Chest: Effort normal.   Musculoskeletal:        Left knee: She exhibits decreased range of motion and swelling. Tenderness found. Neurological: She is alert and oriented to person, place, and time.  Gait ( antalgic gait) abnormal. Skin: Skin is warm and dry. No rash noted. She is not diaphoretic. No erythema. Psychiatric: Mood, memory, affect and judgment normal.   Nursing note and vitals reviewed. ASSESSMENT:    1. Chronic pain of left knee    2. Chronic pain syndrome    3. Chondromalacia patellae, left    4. Chronic pain of both knees           [place] Prescription Monitoring Program was reviewed which does not demonstrate aberrancies and/or inconsistencies with regard to the historical prescribing of controlled medications to this patient by other providers. NOTE: Disclosed Percocet filled 7/28/16 for 5 days for postsurgical pain. Discussed percocet filled 1/5/17 for post procedural pain. PLAN / Pt Instructions:  1. Continue current plan with no evidence of addiction or diversion. Stable on current medication without adverse events. 2. Refill Opana ER 20 mg 3 times daily. 3. Refill Nucynta 75 mg up to 3 times daily as needed. 4. Continue Restoril 15 mg capsule once nightly as needed for sleep. With 3 refills  5. Refill Elavil 25 mg tablet once nightly as needed. 3 refills. Do not use along with Nucynta  6. Continue Robaxin 500 mg tablet up to 3 times daily. 3 refills. 7. Previously prescribed naloxone rescue injector. 8. Referral to Orthopedic specialist for worsening left knee pain. Second opinion and knee aspiration  9. Discussed risks of addiction, dependency, and opioid induced hyperalgesia. 10. Return to clinic in 2 months     Medications Ordered Today   Medications    tapentadol (NUCYNTA) 75 mg tablet     Sig: Take 75 mg by mouth three (3) times daily as needed for Pain for up to 30 days. Max Daily Amount: 225 mg. Dispense:  90 Tab     Refill:  0    tapentadol (NUCYNTA) 75 mg tablet     Sig: Take 75 mg by mouth three (3) times daily as needed for Pain for up to 30 days. Max Daily Amount: 225 mg.      Dispense:  90 Tab     Refill:  0    oxyMORphone (OPANA ER) 20 mg PO ER tablet     Sig: Take 1 Tab by mouth every eight (8) hours for 30 days. Max Daily Amount: 60 mg. Dispense:  90 Tab     Refill:  0    oxyMORphone (OPANA ER) 20 mg PO ER tablet     Sig: Take 1 Tab by mouth every eight (8) hours for 30 days. Max Daily Amount: 60 mg. Dispense:  90 Tab     Refill:  0    amitriptyline (ELAVIL) 25 mg tablet     Sig: TAKE 1 TABLET BY MOUTH EVERY NIGHT     Dispense:  30 Tab     Refill:  3       Pain medications prescribed with the objective of pain relief and improved physical and psychosocial function in this patient. Spent 25 minutes with patient today reviewing the treatment plan, goals of treatment plan, and limitations of the treatment plan, to include the potential for side effects from medications and procedures. Merline Maas, [id number] 6/12/2017      Note: Please excuse any typographical errors. Voice recognition software was used for this note and may cause mistakes.

## 2017-06-12 NOTE — PATIENT INSTRUCTIONS
1. Continue current plan with no evidence of addiction or diversion. Stable on current medication without adverse events. 2. Refill Opana ER 20 mg 3 times daily. 3. Refill Nucynta 75 mg up to 3 times daily as needed. 4. Continue Restoril 15 mg capsule once nightly as needed for sleep. With 3 refills  5. Refill Elavil 25 mg tablet once nightly as needed. 3 refills. Do not use along with Nucynta  6. Continue Robaxin 500 mg tablet up to 3 times daily. 3 refills. 7. Previously prescribed naloxone rescue injector. 8. Referral to Orthopedic specialist for worsening left knee pain. Second opinion and knee aspiration  9. Discussed risks of addiction, dependency, and opioid induced hyperalgesia.    10. Return to clinic in 2 months

## 2017-06-12 NOTE — MR AVS SNAPSHOT
Visit Information Date & Time Provider Department Dept. Phone Encounter #  
 6/12/2017  1:40 PM Herminia Prader, [address] [place] [address] for Pain Management [id number] Follow-up Instructions Return in about 2 months (around 8/12/2017). Upcoming Health Maintenance Date Due DTaP/Tdap/Td series (1 - Tdap) 11/23/1997 PAP AKA CERVICAL CYTOLOGY 11/23/1997 INFLUENZA AGE 9 TO ADULT 8/1/2017 Allergies as of 6/12/2017  Review Complete On: 6/12/2017 By: General Angel No Known Allergies Current Immunizations  Never Reviewed No immunizations on file. Not reviewed this visit You Were Diagnosed With   
  
 Codes Comments Chronic pain of left knee     ICD-10-CM: M25.562, G89.29 ICD-9-CM: 719.46, 338.29 Chronic pain syndrome     ICD-10-CM: G89.4 ICD-9-CM: 338. 4 Chondromalacia patellae, left     ICD-10-CM: M22.42 
ICD-9-CM: 717.7 Chronic pain of both knees     ICD-10-CM: M25.561, M25.562, G89.29 ICD-9-CM: 719.46, 338.29 Vitals BP Pulse Height(growth percentile) Weight(growth percentile) LMP BMI  
 125/87 (!) 106 5' 9\" (1.753 m) 266 lb (120.7 kg) 07/23/2016 39.28 kg/m2 OB Status Smoking Status Hysterectomy Never Smoker BMI and BSA Data Body Mass Index Body Surface Area  
 39.28 kg/m 2 2.42 m 2 Preferred Pharmacy Pharmacy Name Phone [facility] [address] [place] [address] Your Updated Medication List  
  
   
This list is accurate as of: 6/12/17  2:21 PM.  Always use your most recent med list.  
  
  
  
  
 amitriptyline 25 mg tablet Commonly known as:  ELAVIL TAKE 1 TABLET BY MOUTH EVERY NIGHT  
  
 bisacodyl 10 mg suppository Commonly known as:  DULCOLAX Insert 10 mg into rectum daily. enoxaparin 40 mg/0.4 mL Commonly known as:  LOVENOX  
0.4 mL by SubCUTAneous route every twenty-four (24) hours. gabapentin 300 mg capsule Commonly known as:  NEURONTIN Take 300 mg by mouth three (3) times daily. ibuprofen 600 mg tablet Commonly known as:  MOTRIN Take 1 Tab by mouth every six (6) hours as needed. losartan-hydroCHLOROthiazide 100-25 mg per tablet Commonly known as:  HYZAAR Take 1 Tab by mouth daily. naloxone 4 mg/actuation Spry Commonly known as:  NARCAN  
4 mg by Nasal route as needed. Spray contents of 1 nasal spray in nostril as one single dose. May repeat in 2-3 minutes in alternating nostril until EMS arrives. Indications: OPIATE-INDUCED RESPIRATORY DEPRESSION  
  
 NIFEdipine ER 30 mg ER tablet Commonly known as:  ADALAT CC Take  by mouth daily. oxyCODONE-acetaminophen 5-325 mg per tablet Commonly known as:  PERCOCET Take 1-2 Tabs by mouth every four (4) hours as needed. Max Daily Amount: 12 Tabs. * oxyMORphone 20 mg ER tablet Commonly known as:  OPANA ER Take 1 Tab by mouth every eight (8) hours for 30 days. Max Daily Amount: 60 mg.  
  
 * oxyMORphone 20 mg ER tablet Commonly known as:  OPANA ER Take 1 Tab by mouth every eight (8) hours for 30 days. Max Daily Amount: 60 mg. Start taking on:  7/11/2017 RANEXA 500 mg SR tablet Generic drug:  ranolazine ER Take 1,000 mg by mouth two (2) times a day. * tapentadol 75 mg tablet Commonly known as:  Manas Cocker Take 75 mg by mouth three (3) times daily as needed for Pain for up to 30 days. Max Daily Amount: 225 mg.  
  
 * tapentadol 75 mg tablet Commonly known as:  Manas Cocker Take 75 mg by mouth three (3) times daily as needed for Pain for up to 30 days. Max Daily Amount: 225 mg. Start taking on:  7/10/2017 TOPAMAX 50 mg tablet Generic drug:  topiramate Take  by mouth two (2) times a day. VITAMIN D2 50,000 unit capsule Generic drug:  ergocalciferol Take 50,000 Units by mouth every seven (7) days. ZOLOFT 50 mg tablet Generic drug:  sertraline Take  by mouth daily. * Notice: This list has 4 medication(s) that are the same as other medications prescribed for you. Read the directions carefully, and ask your doctor or other care provider to review them with you. Prescriptions Printed Refills  
 tapentadol (NUCYNTA) 75 mg tablet 0 Sig: Take 75 mg by mouth three (3) times daily as needed for Pain for up to 30 days. Max Daily Amount: 225 mg.  
 Class: Print Route: Oral  
 tapentadol (NUCYNTA) 75 mg tablet 0 Starting on: 7/10/2017 Sig: Take 75 mg by mouth three (3) times daily as needed for Pain for up to 30 days. Max Daily Amount: 225 mg.  
 Class: Print Route: Oral  
 oxyMORphone (OPANA ER) 20 mg PO ER tablet 0 Sig: Take 1 Tab by mouth every eight (8) hours for 30 days. Max Daily Amount: 60 mg.  
 Class: Print Route: Oral  
 oxyMORphone (OPANA ER) 20 mg PO ER tablet 0 Starting on: 7/11/2017 Sig: Take 1 Tab by mouth every eight (8) hours for 30 days. Max Daily Amount: 60 mg.  
 Class: Print Route: Oral  
  
Prescriptions Sent to Pharmacy Refills  
 amitriptyline (ELAVIL) 25 mg tablet 3 Sig: TAKE 1 TABLET BY MOUTH EVERY NIGHT Class: Normal  
 Pharmacy: [facility] [address] [place] [address] Ph #: [phone] Follow-up Instructions Return in about 2 months (around 8/12/2017). Patient Instructions 1. Continue current plan with no evidence of addiction or diversion. Stable on current medication without adverse events. 2. Refill Opana ER 20 mg 3 times daily. 3. Refill Nucynta 75 mg up to 3 times daily as needed. 4. Continue Restoril 15 mg capsule once nightly as needed for sleep. With 3 refills 5. Refill Elavil 25 mg tablet once nightly as needed. 3 refills. Do not use along with Nucynta 6. Continue Robaxin 500 mg tablet up to 3 times daily. 3 refills. 7. Previously prescribed naloxone rescue injector. 8. Referral to Orthopedic specialist for worsening left knee pain. Second opinion and knee aspiration 9. Discussed risks of addiction, dependency, and opioid induced hyperalgesia. 10. Return to clinic in 2 months Introducing [facility]! [facility] introduces Metago patient portal. Now you can access parts of your medical record, email your doctor's office, and request medication refills online. 1. In your internet browser, go to [email or web address] 2. Click on the First Time User? Click Here link in the Sign In box. You will see the New Member Sign Up page. 3. Enter your Metago Access Code exactly as it appears below. You will not need to use this code after youve completed the sign-up process. If you do not sign up before the expiration date, you must request a new code. · Metago Access Code: [email or web address] Expires: 9/10/2017  2:21 PM 
 
4. Enter the last four digits of your Social Security Number (xxxx) and Date of Birth (mm/dd/yyyy) as indicated and click Submit. You will be taken to the next sign-up page. 5. Create a Metago ID. This will be your Metago login ID and cannot be changed, so think of one that is secure and easy to remember. 6. Create a Metago password. You can change your password at any time. 7. Enter your Password Reset Question and Answer. This can be used at a later time if you forget your password. 8. Enter your e-mail address. You will receive e-mail notification when new information is available in [address]. 9. Click Sign Up. You can now view and download portions of your medical record. 10. Click the Download Summary menu link to download a portable copy of your medical information. If you have questions, please visit the Frequently Asked Questions section of the Metago website. Remember, Metago is NOT to be used for urgent needs. For medical emergencies, dial 911. Now available from your iPhone and Android! Please provide this summary of care documentation to your next provider. Your primary care clinician is listed as Saira Neal. If you have any questions after today's visit, please call [phone].

## 2017-07-10 ENCOUNTER — TELEPHONE (OUTPATIENT)
Dept: PAIN MANAGEMENT | Age: 42
End: 2017-07-10

## 2017-07-10 NOTE — TELEPHONE ENCOUNTER
Returned pt's call re pa for rachel dolan - explained I did receive the pa request from the pharmacy - I am working on it - have several people ahead of her, but will submit pa as soon as I can. Pt understood.

## 2017-07-11 ENCOUNTER — TELEPHONE (OUTPATIENT)
Dept: PAIN MANAGEMENT | Age: 42
End: 2017-07-11

## 2017-07-11 NOTE — TELEPHONE ENCOUNTER
Pt called again - had called [place] re pa - as I explained yesterday I am working as quickly as I can to get the prior auths done - I still have about 6 pas ahead of hers, but I should be able to submit it today. Pt understood.

## 2017-07-11 NOTE — TELEPHONE ENCOUNTER
Pt called again re pa for pain meds. Explained pa was submitted around noon today - [facility] should be getting it if they haven't already and it was marked urgent. Pt asked if she could get a partial fill. Told her yes if insurance allows she can get a partial fill and once the medication is approved the provider will issue new scripts for the rest of the month. Pt asked if I am sure - told her yes because the new regulations only allow for a certain amount of meds without prior auth. Told pt to call insurance to find out what the will allow to be filled. Pt said she would call insurance.

## 2017-07-12 ENCOUNTER — TELEPHONE (OUTPATIENT)
Dept: PAIN MANAGEMENT | Age: 42
End: 2017-07-12

## 2017-07-12 NOTE — TELEPHONE ENCOUNTER
Nucynta and opana er were both approved by [place]. Called pt to let her know of approval - spoke with mother - said pt did get her meds yesterday 7/11/17. Faxed to pharmacy.

## 2017-08-10 ENCOUNTER — OFFICE VISIT (OUTPATIENT)
Dept: PAIN MANAGEMENT | Age: 42
End: 2017-08-10

## 2017-08-10 VITALS
RESPIRATION RATE: 22 BRPM | TEMPERATURE: 97.4 F | HEART RATE: 91 BPM | DIASTOLIC BLOOD PRESSURE: 103 MMHG | WEIGHT: 266 LBS | BODY MASS INDEX: 39.28 KG/M2 | SYSTOLIC BLOOD PRESSURE: 139 MMHG

## 2017-08-10 DIAGNOSIS — G89.29 CHRONIC PAIN OF BOTH KNEES: ICD-10-CM

## 2017-08-10 DIAGNOSIS — M25.561 CHRONIC PAIN OF BOTH KNEES: ICD-10-CM

## 2017-08-10 DIAGNOSIS — Z79.899 ENCOUNTER FOR LONG-TERM (CURRENT) USE OF HIGH-RISK MEDICATION: Primary | ICD-10-CM

## 2017-08-10 DIAGNOSIS — M22.42 CHONDROMALACIA PATELLAE, LEFT: ICD-10-CM

## 2017-08-10 DIAGNOSIS — G89.29 CHRONIC PAIN OF LEFT KNEE: ICD-10-CM

## 2017-08-10 DIAGNOSIS — M25.562 CHRONIC PAIN OF BOTH KNEES: ICD-10-CM

## 2017-08-10 DIAGNOSIS — M25.562 CHRONIC PAIN OF LEFT KNEE: ICD-10-CM

## 2017-08-10 DIAGNOSIS — G89.4 CHRONIC PAIN SYNDROME: ICD-10-CM

## 2017-08-10 LAB
ALCOHOL UR POC: NORMAL
AMPHETAMINES UR POC: NEGATIVE
BARBITURATES UR POC: NEGATIVE
BENZODIAZEPINES UR POC: NORMAL
BUPRENORPHINE UR POC: NORMAL
CANNABINOIDS UR POC: NEGATIVE
CARISOPRODOL UR POC: NORMAL
COCAINE UR POC: NEGATIVE
FENTANYL UR POC: NORMAL
MDMA/ECSTASY UR POC: NEGATIVE
METHADONE UR POC: NEGATIVE
METHAMPHETAMINE UR POC: NEGATIVE
METHYLPHENIDATE UR POC: NEGATIVE
OPIATES UR POC: NEGATIVE
OXYCODONE UR POC: NORMAL
PHENCYCLIDINE UR POC: NORMAL
PROPOXYPHENE UR POC: NORMAL
TRAMADOL UR POC: NORMAL
TRICYCLICS UR POC: NEGATIVE

## 2017-08-10 RX ORDER — METHOCARBAMOL 500 MG/1
500 TABLET, FILM COATED ORAL 3 TIMES DAILY
Qty: 90 TAB | Refills: 3 | Status: SHIPPED | OUTPATIENT
Start: 2017-08-10 | End: 2017-10-09 | Stop reason: SDUPTHER

## 2017-08-10 RX ORDER — TEMAZEPAM 15 MG/1
15 CAPSULE ORAL
Qty: 30 CAP | Refills: 3 | Status: SHIPPED | OUTPATIENT
Start: 2017-08-25 | End: 2017-10-09 | Stop reason: SDUPTHER

## 2017-08-10 NOTE — PROGRESS NOTES
HISTORY OF PRESENT ILLNESS  Maria Isabel Da Sliva is a 37 y.o. female    HPI: Ms. Linda Cutler  returns today for f/u of chronic left knee pain. 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 The pain started after a fall, March 13, 2010. She underwent one surgery, ACL reconstruction, around July, 2010. She continues unchanged since last visit. She was referred to orthopedic surgeon last visit for possible knee aspiration. She has schedule appointment but their first appointment was not until later this month. She continues to complain of swelling and tenderness in bilateral knees. We will continue with her current treatment plan and have her follow-up after her orthopedic consultation    Current medication management consists of Opana ER 20 mg every 8 hours, Nucynta IR 75 mg 3 times a day as needed, Elavil 25 mg at night, and Restoril 30 mg at night. Medications are helping with pain control and quality of life. Her pain is 6-7/10 with medication and 10/10 without. Pt describes pain as aching. Aggravating factors include standing and walking. Relieved with rest, medication, and avoiding painful activities. Current treatment is helping to improve general activity, mood, walking, sleep, enjoyment of life    She  is otherwise doing well with no other complaints today. She denies any adverse events including nausea, vomiting, dizziness, increased constipation, hallucinations, or seizures. Because the patient's current regimen places him/her at increased risk for possible overdose, a prescription for naloxone nasal spray has been provided. The patient understands that this medication is only to be used in the setting of a possible overdose and that inadvertent use of this medication could precipitate overt withdrawal.    Attended education class on 4/12/2016    POC UDS today.  Confirmation pending. PRIOR IMAGIN. Lumbar MRI 16: Multilevel degenerative disc disease and facet arthropathy. Moderate canal narrowing and moderate bilateral neural foramina at L3-L4. L4-L5 moderate bilateral neural foraminal narrowing with mild canal narrowing. No Known Allergies    Past Surgical History:   Procedure Laterality Date    HX ACL RECONSTRUCTION      HX HERNIA REPAIR      HX KNEE ARTHROSCOPY           Review of Systems   Constitutional: Negative for chills and fever. HENT: Negative for congestion and sore throat. Eyes: Negative for blurred vision and double vision. Respiratory: Positive for shortness of breath. Negative for cough and wheezing. Cardiovascular: Positive for chest pain and leg swelling. Negative for palpitations. Gastrointestinal: Positive for heartburn. Negative for abdominal pain, constipation, nausea and vomiting. Genitourinary: Negative for dysuria and urgency. Musculoskeletal: Positive for back pain and joint pain. Skin: Negative for itching and rash. Neurological: Positive for weakness and headaches. Negative for dizziness, seizures and loss of consciousness. Endo/Heme/Allergies: Negative for environmental allergies. Does not bruise/bleed easily. Psychiatric/Behavioral: Positive for depression. Negative for suicidal ideas. The patient is nervous/anxious and has insomnia. Physical Exam   Constitutional: She is oriented to person, place, and time and well-developed, well-nourished, and in no distress. No distress. obese   HENT:   Head: Normocephalic and atraumatic. Eyes: EOM are normal.   Neck: Normal range of motion. Pulmonary/Chest: Effort normal.   Musculoskeletal:        Left knee: She exhibits decreased range of motion and swelling. Tenderness found. Neurological: She is alert and oriented to person, place, and time. Gait ( antalgic gait) abnormal.   Skin: Skin is warm and dry. No rash noted. She is not diaphoretic.  No erythema. Psychiatric: Mood, memory, affect and judgment normal.   Nursing note and vitals reviewed. ASSESSMENT:    1. Encounter for long-term (current) use of high-risk medication    2. Chronic pain of both knees    3. Chondromalacia patellae, left    4. Chronic pain syndrome    5. Chronic pain of left knee           [place] Prescription Monitoring Program was reviewed which does not demonstrate aberrancies and/or inconsistencies with regard to the historical prescribing of controlled medications to this patient by other providers. NOTE: Disclosed Percocet filled 7/28/16 for 5 days for postsurgical pain. Discussed percocet filled 1/5/17 for post procedural pain. PLAN / Pt Instructions:  1. Continue current plan with no evidence of addiction or diversion. Stable on current medication without adverse events. 2. Refill Opana ER 20 mg 3 times daily. 3. Refill Nucynta 75 mg up to 3 times daily as needed. 4. Refill Restoril 15 mg capsule once nightly as needed for sleep. With 3 refills  5. Continue Elavil 25 mg tablet once nightly as needed. Do not use along with Nucynta  6. Continue Robaxin 500 mg tablet up to 3 times daily. 3 refills. 7. Naloxone 4 mg nasal spray for opioid induced respiratory depression emergency only. 8. Continue to f/u Orthopedic specialist for  knee aspiration  9. Discussed risks of addiction, dependency, and opioid induced hyperalgesia. 10. Return to clinic in 2 months       Medications Ordered Today   Medications    oxyMORphone (OPANA ER) 20 mg PO ER tablet     Sig: Take 1 Tab by mouth every eight (8) hours for 30 days. Max Daily Amount: 60 mg. Dispense:  90 Tab     Refill:  0    oxyMORphone (OPANA ER) 20 mg PO ER tablet     Sig: Take 1 Tab by mouth every eight (8) hours for 30 days. Max Daily Amount: 60 mg.      Dispense:  90 Tab     Refill:  0    tapentadol (NUCYNTA) 75 mg tablet     Sig: Take 75 mg by mouth three (3) times daily as needed for Pain for up to 30 days. Max Daily Amount: 225 mg. Dispense:  90 Tab     Refill:  0    tapentadol (NUCYNTA) 75 mg tablet     Sig: Take 75 mg by mouth three (3) times daily as needed for Pain for up to 30 days. Max Daily Amount: 225 mg. Dispense:  90 Tab     Refill:  0    temazepam (RESTORIL) 15 mg capsule     Sig: Take 1 Cap by mouth nightly as needed for Sleep for up to 30 days. Max Daily Amount: 15 mg. Dispense:  30 Cap     Refill:  3    methocarbamol (ROBAXIN) 500 mg tablet     Sig: Take 1 Tab by mouth three (3) times daily for 30 days. Dispense:  90 Tab     Refill:  3       Pain medications prescribed with the objective of pain relief and improved physical and psychosocial function in this patient. Spent 25 minutes with patient today reviewing the treatment plan, goals of treatment plan, and limitations of the treatment plan, to include the potential for side effects from medications and procedures. Jonathan Herrera 8/10/2017      Note: Please excuse any typographical errors. Voice recognition software was used for this note and may cause mistakes.

## 2017-08-10 NOTE — MR AVS SNAPSHOT
Visit Information Date & Time Provider Department Dept. Phone Encounter #  
 8/10/2017  9:20 AM Chelle Monahan, [address] [place] [address] for Pain Management [id number] Follow-up Instructions Return in about 2 months (around 10/10/2017). Your Appointments 8/28/2017  1:30 PM  
New Patient with All Moreno MD  
[address] [facility], [address] and [facility] [place] [address] [facility]) Appt Note: bilat knee pain (primarily left knee) nx optima, refd dr Gabriela Espinoza; PT R/S FROM 5/2/17  
 [address] [place] [address], [address] [facility] [address] [phone]  
  
   
 [address] [facility], [address] [place], [place] [address] [address] Upcoming Health Maintenance Date Due DTaP/Tdap/Td series (1 - Tdap) 11/23/1997 PAP AKA CERVICAL CYTOLOGY 11/23/1997 INFLUENZA AGE 9 TO ADULT 8/1/2017 Allergies as of 8/10/2017  Review Complete On: 8/10/2017 By: SHAGUFTA Banda No Known Allergies Current Immunizations  Never Reviewed No immunizations on file. Not reviewed this visit You Were Diagnosed With   
  
 Codes Comments Encounter for long-term (current) use of high-risk medication    -  Primary ICD-10-CM: K33.027 ICD-9-CM: V58.69 Chronic pain of both knees     ICD-10-CM: M25.561, M25.562, G89.29 ICD-9-CM: 719.46, 338.29 Chondromalacia patellae, left     ICD-10-CM: M22.42 
ICD-9-CM: 717.7 Chronic pain syndrome     ICD-10-CM: G89.4 ICD-9-CM: 338. 4 Chronic pain of left knee     ICD-10-CM: M25.562, G89.29 ICD-9-CM: 719.46, 338.29 Vitals BP Pulse Temp Resp Weight(growth percentile) LMP  
 (!) 139/103 (BP 1 Location: Left arm, BP Patient Position: Sitting) 91 97.4 °F (36.3 °C) (Oral) 22 266 lb (120.7 kg) 07/23/2016 BMI OB Status Smoking Status 39.28 kg/m2 Hysterectomy Never Smoker Vitals History BMI and BSA Data  Body Mass Index Body Surface Area  
 39.28 kg/m 2 2.42 m 2  
  
  
 Preferred Pharmacy Pharmacy Name Phone [facility] [id number] [place] T[place] Walker Your Updated Medication List  
  
   
This list is accurate as of: 8/10/17 10:06 AM.  Always use your most recent med list.  
  
  
  
  
 amitriptyline 25 mg tablet Commonly known as:  ELAVIL TAKE 1 TABLET BY MOUTH EVERY NIGHT  
  
 bisacodyl 10 mg suppository Commonly known as:  DULCOLAX Insert 10 mg into rectum daily. enoxaparin 40 mg/0.4 mL Commonly known as:  LOVENOX  
0.4 mL by SubCUTAneous route every twenty-four (24) hours. gabapentin 300 mg capsule Commonly known as:  NEURONTIN Take 300 mg by mouth three (3) times daily. ibuprofen 600 mg tablet Commonly known as:  MOTRIN Take 1 Tab by mouth every six (6) hours as needed. losartan-hydroCHLOROthiazide 100-25 mg per tablet Commonly known as:  HYZAAR Take 1 Tab by mouth daily. methocarbamol 500 mg tablet Commonly known as:  ROBAXIN Take 1 Tab by mouth three (3) times daily for 30 days. naloxone 4 mg/actuation Spry Commonly known as:  NARCAN  
4 mg by Nasal route as needed. Spray contents of 1 nasal spray in nostril as one single dose. May repeat in 2-3 minutes in alternating nostril until EMS arrives. Indications: OPIATE-INDUCED RESPIRATORY DEPRESSION  
  
 NIFEdipine ER 30 mg ER tablet Commonly known as:  ADALAT CC Take  by mouth daily. oxyCODONE-acetaminophen 5-325 mg per tablet Commonly known as:  PERCOCET Take 1-2 Tabs by mouth every four (4) hours as needed. Max Daily Amount: 12 Tabs. * oxyMORphone 20 mg ER tablet Commonly known as:  OPANA ER Take 1 Tab by mouth every eight (8) hours for 30 days. Max Daily Amount: 60 mg.  
  
 * oxyMORphone 20 mg ER tablet Commonly known as:  OPANA ER Take 1 Tab by mouth every eight (8) hours for 30 days. Max Daily Amount: 60 mg. Start taking on:  9/9/2017 RANEXA 500 mg SR tablet Generic drug:  ranolazine ER Take 1,000 mg by mouth two (2) times a day. * tapentadol 75 mg tablet Commonly known as:  Julieta Rough Take 75 mg by mouth three (3) times daily as needed for Pain for up to 30 days. Max Daily Amount: 225 mg.  
  
 * tapentadol 75 mg tablet Commonly known as:  Julieta Rough Take 75 mg by mouth three (3) times daily as needed for Pain for up to 30 days. Max Daily Amount: 225 mg. Start taking on:  9/9/2017  
  
 temazepam 15 mg capsule Commonly known as:  RESTORIL Take 1 Cap by mouth nightly as needed for Sleep for up to 30 days. Max Daily Amount: 15 mg. Start taking on:  8/25/2017 TOPAMAX 50 mg tablet Generic drug:  topiramate Take  by mouth two (2) times a day. VITAMIN D2 50,000 unit capsule Generic drug:  ergocalciferol Take 50,000 Units by mouth every seven (7) days. ZOLOFT 50 mg tablet Generic drug:  sertraline Take  by mouth daily. * Notice: This list has 4 medication(s) that are the same as other medications prescribed for you. Read the directions carefully, and ask your doctor or other care provider to review them with you. Prescriptions Printed Refills  
 oxyMORphone (OPANA ER) 20 mg PO ER tablet 0 Sig: Take 1 Tab by mouth every eight (8) hours for 30 days. Max Daily Amount: 60 mg.  
 Class: Print Route: Oral  
 oxyMORphone (OPANA ER) 20 mg PO ER tablet 0 Starting on: 9/9/2017 Sig: Take 1 Tab by mouth every eight (8) hours for 30 days. Max Daily Amount: 60 mg.  
 Class: Print Route: Oral  
 tapentadol (NUCYNTA) 75 mg tablet 0 Sig: Take 75 mg by mouth three (3) times daily as needed for Pain for up to 30 days. Max Daily Amount: 225 mg.  
 Class: Print Route: Oral  
 tapentadol (NUCYNTA) 75 mg tablet 0 Starting on: 9/9/2017 Sig: Take 75 mg by mouth three (3) times daily as needed for Pain for up to 30 days. Max Daily Amount: 225 mg.  
 Class: Print  Route: Oral  
 temazepam (RESTORIL) 15 mg capsule 3 Starting on: 8/25/2017 Sig: Take 1 Cap by mouth nightly as needed for Sleep for up to 30 days. Max Daily Amount: 15 mg.  
 Class: Print Route: Oral  
  
Prescriptions Sent to Pharmacy Refills  
 methocarbamol (ROBAXIN) 500 mg tablet 3 Sig: Take 1 Tab by mouth three (3) times daily for 30 days. Class: Normal  
 Pharmacy: [facility]EL[facility] [address] [place] [address], [address] [place] [address] Ph #: [phone] Route: Oral  
  
We Performed the Following AMB POC DRUG SCREEN () [ [facility]] DRUG SCREEN [[id number] Custom] Follow-up Instructions Return in about 2 months (around 10/10/2017). Patient Instructions 1. Continue current plan with no evidence of addiction or diversion. Stable on current medication without adverse events. 2. Refill Opana ER 20 mg 3 times daily. 3. Refill Nucynta 75 mg up to 3 times daily as needed. 4. Refill Restoril 15 mg capsule once nightly as needed for sleep. With 3 refills 5. Continue Elavil 25 mg tablet once nightly as needed. Do not use along with Nucynta 6. Continue Robaxin 500 mg tablet up to 3 times daily. 3 refills. 7. Naloxone 4 mg nasal spray for opioid induced respiratory depression emergency only. 8. Continue to f/u Orthopedic specialist for  knee aspiration 9. Discussed risks of addiction, dependency, and opioid induced hyperalgesia. 10. Return to clinic in 2 months Introducing [facility]! Cristal Shine introduces [employer] patient portal. Now you can access parts of your medical record, email your doctor's office, and request medication refills online. 1. In your internet browser, go to [email or web address] 2. Click on the First Time User? Click Here link in the Sign In box. You will see the New Member Sign Up page. 3. Enter your [employer] Access Code exactly as it appears below.  You will not need to use this code after youve completed the sign-up process. If you do not sign up before the expiration date, you must request a new code. · [employer] Access Code: [email or web address] Expires: 9/10/2017  2:21 PM 
 
4. Enter the last four digits of your Social Security Number (xxxx) and Date of Birth (mm/dd/yyyy) as indicated and click Submit. You will be taken to the next sign-up page. 5. Create a [employer] ID. This will be your [employer] login ID and cannot be changed, so think of one that is secure and easy to remember. 6. Create a [employer] password. You can change your password at any time. 7. Enter your Password Reset Question and Answer. This can be used at a later time if you forget your password. 8. Enter your e-mail address. You will receive e-mail notification when new information is available in [address]. 9. Click Sign Up. You can now view and download portions of your medical record. 10. Click the Download Summary menu link to download a portable copy of your medical information. If you have questions, please visit the Frequently Asked Questions section of the [employer] website. Remember, [employer] is NOT to be used for urgent needs. For medical emergencies, dial 911. Now available from your iPhone and Android! Please provide this summary of care documentation to your next provider. If you have any questions after today's visit, please call [phone].

## 2017-08-10 NOTE — PATIENT INSTRUCTIONS
1. Continue current plan with no evidence of addiction or diversion. Stable on current medication without adverse events. 2. Refill Opana ER 20 mg 3 times daily. 3. Refill Nucynta 75 mg up to 3 times daily as needed. 4. Refill Restoril 15 mg capsule once nightly as needed for sleep. With 3 refills  5. Continue Elavil 25 mg tablet once nightly as needed. Do not use along with Nucynta  6. Continue Robaxin 500 mg tablet up to 3 times daily. 3 refills. 7. Naloxone 4 mg nasal spray for opioid induced respiratory depression emergency only. 8. Continue to f/u Orthopedic specialist for  knee aspiration  9. Discussed risks of addiction, dependency, and opioid induced hyperalgesia.    10. Return to clinic in 2 months

## 2017-08-28 ENCOUNTER — OFFICE VISIT (OUTPATIENT)
Dept: ORTHOPEDIC SURGERY | Facility: CLINIC | Age: 42
End: 2017-08-28

## 2017-08-28 VITALS
DIASTOLIC BLOOD PRESSURE: 91 MMHG | TEMPERATURE: 98.3 F | WEIGHT: 252.6 LBS | HEIGHT: 69 IN | SYSTOLIC BLOOD PRESSURE: 131 MMHG | HEART RATE: 85 BPM | RESPIRATION RATE: 16 BRPM | BODY MASS INDEX: 37.41 KG/M2

## 2017-08-28 DIAGNOSIS — M17.32 POST-TRAUMATIC OSTEOARTHRITIS OF LEFT KNEE: Primary | ICD-10-CM

## 2017-08-28 DIAGNOSIS — G89.29 CHRONIC BILATERAL LOW BACK PAIN, WITH SCIATICA PRESENCE UNSPECIFIED: ICD-10-CM

## 2017-08-28 DIAGNOSIS — M54.16 LUMBAR RADICULOPATHY: ICD-10-CM

## 2017-08-28 DIAGNOSIS — M25.562 PAIN IN BOTH KNEES, UNSPECIFIED CHRONICITY: ICD-10-CM

## 2017-08-28 DIAGNOSIS — M25.462 KNEE EFFUSION, LEFT: ICD-10-CM

## 2017-08-28 DIAGNOSIS — M79.2 NEUROPATHIC PAIN: ICD-10-CM

## 2017-08-28 DIAGNOSIS — E11.49 OTHER DIABETIC NEUROLOGICAL COMPLICATION ASSOCIATED WITH TYPE 2 DIABETES MELLITUS (HCC): ICD-10-CM

## 2017-08-28 DIAGNOSIS — M54.5 CHRONIC BILATERAL LOW BACK PAIN, WITH SCIATICA PRESENCE UNSPECIFIED: ICD-10-CM

## 2017-08-28 DIAGNOSIS — M17.11 PRIMARY OSTEOARTHRITIS OF RIGHT KNEE: ICD-10-CM

## 2017-08-28 DIAGNOSIS — M25.561 PAIN IN BOTH KNEES, UNSPECIFIED CHRONICITY: ICD-10-CM

## 2017-08-28 RX ORDER — BUPIVACAINE HYDROCHLORIDE 2.5 MG/ML
10 INJECTION, SOLUTION EPIDURAL; INFILTRATION; INTRACAUDAL ONCE
Qty: 10 ML | Refills: 0
Start: 2017-08-28 | End: 2017-08-28

## 2017-08-28 RX ORDER — BETAMETHASONE SODIUM PHOSPHATE AND BETAMETHASONE ACETATE 3; 3 MG/ML; MG/ML
6 INJECTION, SUSPENSION INTRA-ARTICULAR; INTRALESIONAL; INTRAMUSCULAR; SOFT TISSUE ONCE
Qty: 1 ML | Refills: 0
Start: 2017-08-28 | End: 2017-08-28

## 2017-08-28 RX ORDER — BUPIVACAINE HYDROCHLORIDE 2.5 MG/ML
8 INJECTION, SOLUTION EPIDURAL; INFILTRATION; INTRACAUDAL ONCE
Qty: 8 ML | Refills: 0
Start: 2017-08-28 | End: 2017-08-28

## 2017-08-28 NOTE — PROGRESS NOTES
Patient: Vinod Andres                MRN: [id number]       SSN: [id number]  YOB: 1975        AGE: 37 y.o. SEX: female    PCP: No primary care provider on file. 17    Chief Complaint   Patient presents with    Knee Pain      bilateral knee     HISTORY:  Vinod Andres is a 37 y.o. female who is seen for bilateral knee L>R and low back pain. She states while working at [employer] on 3/13/2010 ([employer]) in [place], she fell on the wet floor. She is s/p left ACL reconstruction and arthroscopy by Dr. Robbie Forde. She reports that her knee surgeries did not improve her pain. She is currently in pain management. She was seen by Dr. Macy Gagnon who told her that she would only be able to work again when she . She reports a history of low back pain and diabetic neuropathy. She states she uses a rollator walker usually because of her pain. She reports that she falls often. Pain Assessment  2017   Location of Pain Knee   Location Modifiers Left;Right   Severity of Pain 10   Quality of Pain Throbbing; Sharp;Dull;Aching;Grinding; Popping   Duration of Pain Persistent   Frequency of Pain Constant   Aggravating Factors Bending;Stretching;Straightening;Exercise;Kneeling;Squatting;Standing;Walking;Stairs   Limiting Behavior Yes   Relieving Factors Rest;Elevation; Other (Comment)   Result of Injury No     Occupation, etc:  Ms. Fawn Hassan is applying for social security disability benefits for her severe knee arthritis. 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 She has two children- a 12year old daughter and a 16year old son. Her children both work at [employer]. Her son plays basketball and her daughter enjoys taking care of children. 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  Ms. Fawn Hassan states she was previously very involved at school with her children and had many certificates including best parent because of her commitment to volunteering. She helps to carpool many needy students--they all call her mom. She lives in [place] Current weight is 252 pounds. She reports she has gained 50 pounds from inactivity. She is 5'9\" tall. She is hypertensive and diabetic. She reports she is taking gabapentin for her neuropathy. No results found for: HBA1C, HGBE8, GEG4WABH, HUL6QAZG, AES0LSXN  Weight Metrics 8/28/2017 8/10/2017 6/12/2017 4/10/2017 2/10/2017 8/22/2016 8/8/2016   Weight 252 lb 9.6 oz 266 lb 266 lb 266 lb 266 lb 266 lb 5.1 oz 269 lb 4.8 oz   BMI 37.3 kg/m2 39.28 kg/m2 39.28 kg/m2 39.28 kg/m2 39.28 kg/m2 39.33 kg/m2 39.75 kg/m2       Patient Active Problem List   Diagnosis Code    Knee pain, left M25.562    Encounter for long-term (current) use of other medications Z79.899    S/P ACL reconstruction Z98.890    Chronic pain syndrome G89.4    Chondromalacia patellae M22.40    Back pain M54.9    Bilateral knee pain M25.561, M25.562    History of knee surgery Z98.890    Pleurodynia R07.81    Overweight E66.3    Migraine G43.909    Swelling of lower extremity M79.89    Limb weakness R29.898    Headache R51    Conversion disorder F44.9    Chronic maxillary sinusitis J32.0     REVIEW OF SYSTEMS: All Below are Negative except: See HPI   Constitutional: negative for fever, chills, and weight loss. Cardiovascular: negative for chest pain, claudication, leg swelling, SOB, ORELLANA   Gastrointestinal: Negative for pain, N/V/C/D, Blood in stool or urine, dysuria,  hematuria, incontinence, pelvic pain. Musculoskeletal: See HPI   Neurological: Negative for dizziness and weakness. Negative for headaches, Visual changes, confusion, seizures   Phychiatric/Behavioral: Negative for depression, memory loss, substance  abuse. Extremities: Negative for hair changes, rash, or skin lesion changes.    Hematologic: Negative for bleeding problems, bruising, pallor or swollen lymph  nodes   Peripheral Vascular: No calf pain, no circulation deficits. Social History     Social History    Marital status:      Spouse name: N/A    Number of children: N/A    Years of education: N/A     Occupational History    Not on file. Social History Main Topics    Smoking status: Never Smoker    Smokeless tobacco: Never Used    Alcohol use No    Drug use: No    Sexual activity: No     Other Topics Concern    Not on file     Social History Narrative      No Known Allergies   Current Outpatient Prescriptions   Medication Sig    oxyMORphone (OPANA ER) 20 mg PO ER tablet Take 1 Tab by mouth every eight (8) hours for 30 days. Max Daily Amount: 60 mg.    [START ON 9/9/2017] oxyMORphone (OPANA ER) 20 mg PO ER tablet Take 1 Tab by mouth every eight (8) hours for 30 days. Max Daily Amount: 60 mg.    tapentadol (NUCYNTA) 75 mg tablet Take 75 mg by mouth three (3) times daily as needed for Pain for up to 30 days. Max Daily Amount: 225 mg.    [START ON 9/9/2017] tapentadol (NUCYNTA) 75 mg tablet Take 75 mg by mouth three (3) times daily as needed for Pain for up to 30 days. Max Daily Amount: 225 mg.  temazepam (RESTORIL) 15 mg capsule Take 1 Cap by mouth nightly as needed for Sleep for up to 30 days. Max Daily Amount: 15 mg.    methocarbamol (ROBAXIN) 500 mg tablet Take 1 Tab by mouth three (3) times daily for 30 days.  amitriptyline (ELAVIL) 25 mg tablet TAKE 1 TABLET BY MOUTH EVERY NIGHT    sertraline (ZOLOFT) 50 mg tablet Take  by mouth daily.  topiramate (TOPAMAX) 50 mg tablet Take  by mouth two (2) times a day.  naloxone (NARCAN) 4 mg/actuation spry 4 mg by Nasal route as needed. Spray contents of 1 nasal spray in nostril as one single dose. May repeat in 2-3 minutes in alternating nostril until EMS arrives. Indications: OPIATE-INDUCED RESPIRATORY DEPRESSION    losartan-hydrochlorothiazide (HYZAAR) 100-25 mg per tablet Take 1 Tab by mouth daily.  NIFEdipine ER (ADALAT CC) 30 mg ER tablet Take  by mouth daily.     ranolazine ER (RANEXA) 500 mg SR tablet Take 1,000 mg by mouth two (2) times a day.  gabapentin (NEURONTIN) 300 mg capsule Take 300 mg by mouth three (3) times daily.  ibuprofen (MOTRIN) 600 mg tablet Take 1 Tab by mouth every six (6) hours as needed.  enoxaparin (LOVENOX) 40 mg/0.4 mL 0.4 mL by SubCUTAneous route every twenty-four (24) hours.  bisacodyl (DULCOLAX) 10 mg suppository Insert 10 mg into rectum daily.  oxyCODONE-acetaminophen (PERCOCET) 5-325 mg per tablet Take 1-2 Tabs by mouth every four (4) hours as needed. Max Daily Amount: 12 Tabs.  ergocalciferol (VITAMIN D2) 50,000 unit capsule Take 50,000 Units by mouth every seven (7) days. No current facility-administered medications for this visit. PHYSICAL EXAMINATION:  Visit Vitals    BP (!) 131/91    Pulse 85    Temp 98.3 °F (36.8 °C)    Resp 16    Ht 5' 9\" (1.753 m)    Wt 252 lb 9.6 oz (114.6 kg)    LMP 07/23/2016    BMI 37.3 kg/m2      ORTHO EXAMINATION:  Examination Right knee Left knee   Skin Intact Intact   Range of motion 120-0 120-0   Effusion - 3+   Medial joint line tenderness + -   Lateral joint line tenderness - +   Popliteal tenderness - -   Osteophytes palpable + +   Brodies - -   Patella crepitus + +   Anterior drawer - -   Lateral laxity - +   Medial laxity - -   Varus deformity - -   Valgus deformity - +   Pretibial edema - -   Calf tenderness - -   Ambulates using a single point cane    PROCEDURE: After timeout and under sterile conditions, left knee aspirated 65 cc of light yellow blood tinged fluid. The fluid was discarded. After discussing treatment options, patient's knees were injected with 4 cc Marcaine and 1/2 cc Celestone.     Chart reviewed for the following:   Angeline Beckett MD, have reviewed the History, Physical and updated the Allergic reactions for [address] performed immediately prior to start of procedure:  Angeline Beckett MD, have performed the following reviews on [place] prior to the start of the procedure:            * Patient was identified by name and date of birth   * Agreement on procedure being performed was verified  * Risks and Benefits explained to the patient  * Procedure site verified and marked as necessary  * Patient was positioned for comfort  * Consent was obtained     Time: 2:10 PM     Date of procedure: 8/28/2017    Procedure performed by:  Shoshana Rodriguez MD    Ms. Hines tolerated the procedure well with no complications. RADIOGRAPHS:  XR LEFT KNEE 8/28/17  IMPRESSION:  Three views - No fractures, no effusion, moderately severe lateral compartment joint space narrowing, + osteophytes present. IMPRESSION:      ICD-10-CM ICD-9-CM    1. Post-traumatic osteoarthritis of left knee M17.32 715.26 betamethasone (CELESTONE SOLUSPAN) 6 mg/mL injection      BETAMETHASONE ACETATE & SODIUM PHOSPHATE INJECTION 3 MG EA.      DRAIN/INJECT LARGE JOINT/BURSA      bupivacaine, PF, (MARCAINE, PF,) 0.25 % (2.5 mg/mL) injection    moderately severe    2. Pain in both knees, unspecified chronicity M25.561 719.46 AMB POC X-RAY KNEE 3 VIEW    M25.562  betamethasone (CELESTONE SOLUSPAN) 6 mg/mL injection      BETAMETHASONE ACETATE & SODIUM PHOSPHATE INJECTION 3 MG EA.      DRAIN/INJECT LARGE JOINT/BURSA      bupivacaine, PF, (MARCAINE, PF,) 0.25 % (2.5 mg/mL) injection   3. Other diabetic neurological complication associated with type 2 diabetes mellitus ([address] E11.49 250.60    4. Chronic bilateral low back pain, with sciatica presence unspecified M54.5 724.2     G89.29 338.29    5. Lumbar radiculopathy M54.16 724.4    6. Neuropathic pain M79.2 729.2    7. Primary osteoarthritis of right knee M17.11 715.16 betamethasone (CELESTONE SOLUSPAN) 6 mg/mL injection      BETAMETHASONE ACETATE & SODIUM PHOSPHATE INJECTION 3 MG EA.      DRAIN/INJECT LARGE JOINT/BURSA      bupivacaine, PF, (MARCAINE, PF,) 0.25 % (2.5 mg/mL) injection   8.  Knee effusion, left M25.462 719.06 bupivacaine, PF, (MARCAINE, PF,) 0.25 % (2.5 mg/mL) injection     PLAN:  After timeout and under sterile conditions, left knee aspirated 65 cc of light yellow blood tinged fluid. The fluid was discarded. After discussing treatment options, patient's knees were injected with 4 cc Marcaine and 1/2 cc Celestone. She will follow up as needed. She was placed on permanent full left knee restrictions--note provided. We discussed the possibility of a left TKR in the future pending weight loss. She will be referred for bariatric surgery consultation.     Scribed by Kj Kovacs ([address] [place] [address] [address]) as dictated by Lyla Boyer MD

## 2017-08-28 NOTE — PATIENT INSTRUCTIONS
Knee Arthritis: Exercises  Your Care Instructions  Here are some examples of exercises for knee arthritis. Start each exercise slowly. Ease off the exercise if you start to have pain. Your doctor or physical therapist will tell you when you can start these exercises and which ones will work best for you. How to do the exercises  Knee flexion with heel slide    1. Lie on your back with your knees bent. 2. Slide your heel back by bending your affected knee as far as you can. Then hook your other foot around your ankle to help pull your heel even farther back. 3. Hold for about 6 seconds, then rest for up to 10 seconds. 4. Repeat 8 to 12 times. 5. Switch legs and repeat steps 1 through 4, even if only one knee is sore. Quad sets    1. Sit with your affected leg straight and supported on the floor or a firm bed. Place a small, rolled-up towel under your knee. Your other leg should be bent, with that foot flat on the floor. 2. Tighten the thigh muscles of your affected leg by pressing the back of your knee down into the towel. 3. Hold for about 6 seconds, then rest for up to 10 seconds. 4. Repeat 8 to 12 times. 5. Switch legs and repeat steps 1 through 4, even if only one knee is sore. Straight-leg raises to the front    1. Lie on your back with your good knee bent so that your foot rests flat on the floor. Your affected leg should be straight. Make sure that your low back has a normal curve. You should be able to slip your hand in between the floor and the small of your back, with your palm touching the floor and your back touching the back of your hand. 2. Tighten the thigh muscles in your affected leg by pressing the back of your knee flat down to the floor. Hold your knee straight. 3. Keeping the thigh muscles tight and your leg straight, lift your affected leg up so that your heel is about 12 inches off the floor. Hold for about 6 seconds, then lower slowly.   4. Relax for up to 10 seconds between repetitions. 5. Repeat 8 to 12 times. 6. Switch legs and repeat steps 1 through 5, even if only one knee is sore. Active knee flexion    1. Lie on your stomach with your knees straight. If your kneecap is uncomfortable, roll up a washcloth and put it under your leg just above your kneecap. 2. Lift the foot of your affected leg by bending the knee so that you bring the foot up toward your buttock. If this motion hurts, try it without bending your knee quite as far. This may help you avoid any painful motion. 3. Slowly move your leg up and down. 4. Repeat 8 to 12 times. 5. Switch legs and repeat steps 1 through 4, even if only one knee is sore. Quadriceps stretch (facedown)    1. Lie flat on your stomach, and rest your face on the floor. 2. Wrap a towel or belt strap around the lower part of your affected leg. Then use the towel or belt strap to slowly pull your heel toward your buttock until you feel a stretch. 3. Hold for about 15 to 30 seconds, then relax your leg against the towel or belt strap. 4. Repeat 2 to 4 times. 5. Switch legs and repeat steps 1 through 4, even if only one knee is sore. Stationary exercise bike    If you do not have a stationary exercise bike at home, you can find one to ride at your local health club or community center. 1. Adjust the height of the bike seat so that your knee is slightly bent when your leg is extended downward. If your knee hurts when the pedal reaches the top, you can raise the seat so that your knee does not bend as much. 2. Start slowly. At first, try to do 5 to 10 minutes of cycling with little to no resistance. Then increase your time and the resistance bit by bit until you can do 20 to 30 minutes without pain. 3. If you start to have pain, rest your knee until your pain gets back to the level that is normal for you. Or cycle for less time or with less effort. Follow-up care is a key part of your treatment and safety.  Be sure to make and go to all appointments, and call your doctor if you are having problems. It's also a good idea to know your test results and keep a list of the medicines you take. Where can you learn more? Go to http://delgado-carlota.info/. Enter C159 in the search box to learn more about \"Knee Arthritis: Exercises. \"  Current as of: March 21, 2017  Content Version: 11.3  © 2006-2017 [employer]. Care instructions adapted under license by [employer] (which disclaims liability or warranty for this information). If you have questions about a medical condition or this instruction, always ask your healthcare professional. [place] [address] any warranty or liability for your use of this information. Joint Injections: Care Instructions  Your Care Instructions  Joint injections are shots into a joint, such as the knee. They may be used to put in medicines, such as pain relievers. Or they can be used to take out fluid. Sometimes the fluid is tested in a lab. This can help find the cause of a joint problem. A corticosteroid, or steroid, shot is used to reduce inflammation in tendons or joints. It is often used to treat problems such as arthritis, tendinitis, and bursitis. Steroids can be injected directly into a painful, inflamed joint. They can also help reduce inflammation of a bursa. A bursa is a sac of fluid. It cushions and lubricates areas where tendons, ligaments, skin, muscles, or bones rub against each other. A steroid shot can sometimes help with short-term pain relief when other treatments haven't worked. If steroid shots help, pain may improve for weeks or months. Follow-up care is a key part of your treatment and safety. Be sure to make and go to all appointments, and call your doctor if you are having problems. It's also a good idea to know your test results and keep a list of the medicines you take. How can you care for yourself at home?   · Put ice or a cold pack on the area for 10 to 20 minutes at a time. Put a thin cloth between the ice and your skin. · Take anti-inflammatory medicines to reduce pain, swelling, or inflammation. These include ibuprofen (Advil, Motrin) and naproxen (Aleve). Read and follow all instructions on the label. · Avoid strenuous activities for several days, especially those that put stress on the area where you got the shot. · If you have dressings over the area, keep them clean and dry. You may remove them when your doctor tells you to. When should you call for help? Call your doctor now or seek immediate medical care if:  · You have signs of infection, such as:  ¨ Increased pain, swelling, warmth, or redness. ¨ Red streaks leading from the site. ¨ Pus draining from the site. ¨ A fever. Watch closely for changes in your health, and be sure to contact your doctor if you have any problems. Where can you learn more? Go to http://delgado-carlota.info/. Enter N616 in the search box to learn more about \"Joint Injections: Care Instructions. \"  Current as of: March 21, 2017  Content Version: 11.3  © 4113-8835 [employer]. Care instructions adapted under license by [employer] (which disclaims liability or warranty for this information). If you have questions about a medical condition or this instruction, always ask your healthcare professional. [address] any warranty or liability for your use of this information.

## 2017-08-28 NOTE — MR AVS SNAPSHOT
Visit Information Date & Time Provider Department Dept. Phone Encounter #  
 8/28/2017  1:30 PM Genet Jackson MD [facility] [phone] Follow-up Instructions Return if symptoms worsen or fail to improve. Your Appointments 10/9/2017  9:40 AM  
Follow Up with [facility] (LETICIA SCHEDULING) Appt Note: return in 2 months [address] [address]  
[phone] Nisreen Travis [phone] [id number] Upcoming Health Maintenance Date Due DTaP/Tdap/Td series (1 - Tdap) 11/23/1997 PAP AKA CERVICAL CYTOLOGY 11/23/1997 INFLUENZA AGE 9 TO ADULT 8/1/2017 Allergies as of 8/28/2017  Review Complete On: 8/28/2017 By: Jovani Dumont LPN No Known Allergies Current Immunizations  Never Reviewed No immunizations on file. Not reviewed this visit You Were Diagnosed With   
  
 Codes Comments Post-traumatic osteoarthritis of left knee    -  Primary ICD-10-CM: M17.32 
ICD-9-CM: 715.26 moderately severe Pain in both knees, unspecified chronicity     ICD-10-CM: M25.561, M25.562 ICD-9-CM: 719.46 Other diabetic neurological complication associated with type 2 diabetes mellitus ([facility] [address]     ICD-10-CM: E11.49 
ICD-9-CM: 250.60 Chronic bilateral low back pain, with sciatica presence unspecified     ICD-10-CM: M54.5, G89.29 ICD-9-CM: 724.2, 338.29 Lumbar radiculopathy     ICD-10-CM: M54.16 
ICD-9-CM: 724.4 Neuropathic pain     ICD-10-CM: M79.2 ICD-9-CM: 729.2 Primary osteoarthritis of right knee     ICD-10-CM: M17.11 ICD-9-CM: 715.16 Knee effusion, left     ICD-10-CM: M25.462 ICD-9-CM: 719.06 Vitals BP Pulse Temp Resp Height(growth percentile) Weight(growth percentile) (!) 131/91 85 98.3 °F (36.8 °C) 16 5' 9\" (1.753 m) 252 lb 9.6 oz (114.6 kg) LMP BMI OB Status Smoking Status 07/23/2016 37.3 kg/m2 Hysterectomy Never Smoker Vitals History BMI and BSA Data Body Mass Index Body Surface Area  
 37.3 kg/m 2 2.36 m 2 Preferred Pharmacy Pharmacy Name Phone [facility] [phone] [place] Your Updated Medication List  
  
   
This list is accurate as of: 8/28/17  2:18 PM.  Always use your most recent med list.  
  
  
  
  
 amitriptyline 25 mg tablet Commonly known as:  ELAVIL TAKE 1 TABLET BY MOUTH EVERY NIGHT  
  
 bisacodyl 10 mg suppository Commonly known as:  DULCOLAX Insert 10 mg into rectum daily. enoxaparin 40 mg/0.4 mL Commonly known as:  LOVENOX  
0.4 mL by SubCUTAneous route every twenty-four (24) hours. gabapentin 300 mg capsule Commonly known as:  NEURONTIN Take 300 mg by mouth three (3) times daily. ibuprofen 600 mg tablet Commonly known as:  MOTRIN Take 1 Tab by mouth every six (6) hours as needed. losartan-hydroCHLOROthiazide 100-25 mg per tablet Commonly known as:  HYZAAR Take 1 Tab by mouth daily. methocarbamol 500 mg tablet Commonly known as:  ROBAXIN Take 1 Tab by mouth three (3) times daily for 30 days. naloxone 4 mg/actuation Spry Commonly known as:  NARCAN  
4 mg by Nasal route as needed. Spray contents of 1 nasal spray in nostril as one single dose. May repeat in 2-3 minutes in alternating nostril until EMS arrives. Indications: OPIATE-INDUCED RESPIRATORY DEPRESSION  
  
 NIFEdipine ER 30 mg ER tablet Commonly known as:  ADALAT CC Take  by mouth daily. oxyCODONE-acetaminophen 5-325 mg per tablet Commonly known as:  PERCOCET Take 1-2 Tabs by mouth every four (4) hours as needed. Max Daily Amount: 12 Tabs. * oxyMORphone 20 mg ER tablet Commonly known as:  OPANA ER Take 1 Tab by mouth every eight (8) hours for 30 days. Max Daily Amount: 60 mg. * oxyMORphone 20 mg ER tablet Commonly known as:  OPANA ER Take 1 Tab by mouth every eight (8) hours for 30 days. Max Daily Amount: 60 mg. Start taking on:  9/9/2017 RANEXA 500 mg SR tablet Generic drug:  ranolazine ER Take 1,000 mg by mouth two (2) times a day. * tapentadol 75 mg tablet Commonly known as:  Joel Nicely Take 75 mg by mouth three (3) times daily as needed for Pain for up to 30 days. Max Daily Amount: 225 mg.  
  
 * tapentadol 75 mg tablet Commonly known as:  Joel Nicely Take 75 mg by mouth three (3) times daily as needed for Pain for up to 30 days. Max Daily Amount: 225 mg. Start taking on:  9/9/2017  
  
 temazepam 15 mg capsule Commonly known as:  RESTORIL Take 1 Cap by mouth nightly as needed for Sleep for up to 30 days. Max Daily Amount: 15 mg.  
  
 TOPAMAX 50 mg tablet Generic drug:  topiramate Take  by mouth two (2) times a day. VITAMIN D2 50,000 unit capsule Generic drug:  ergocalciferol Take 50,000 Units by mouth every seven (7) days. ZOLOFT 50 mg tablet Generic drug:  sertraline Take  by mouth daily. * Notice: This list has 4 medication(s) that are the same as other medications prescribed for you. Read the directions carefully, and ask your doctor or other care provider to review them with you. We Performed the Following AMB POC X-RAY KNEE 3 VIEW [88080 CPT(R)] Follow-up Instructions Return if symptoms worsen or fail to improve. Patient Instructions Knee Arthritis: Exercises Your Care Instructions Here are some examples of exercises for knee arthritis. Start each exercise slowly. Ease off the exercise if you start to have pain. Your doctor or physical therapist will tell you when you can start these exercises and which ones will work best for you. How to do the exercises Knee flexion with heel slide 1. Lie on your back with your knees bent. 2. Slide your heel back by bending your affected knee as far as you can. Then hook your other foot around your ankle to help pull your heel even farther back. 3. Hold for about 6 seconds, then rest for up to 10 seconds. 4. Repeat 8 to 12 times. 5. Switch legs and repeat steps 1 through 4, even if only one knee is sore. [facility] 1. Sit with your affected leg straight and supported on the floor or a firm bed. Place a small, rolled-up towel under your knee. Your other leg should be bent, with that foot flat on the floor. 2. Tighten the thigh muscles of your affected leg by pressing the back of your knee down into the towel. 3. Hold for about 6 seconds, then rest for up to 10 seconds. 4. Repeat 8 to 12 times. 5. Switch legs and repeat steps 1 through 4, even if only one knee is sore. Straight-leg raises to the front 1. Lie on your back with your good knee bent so that your foot rests flat on the floor. Your affected leg should be straight. Make sure that your low back has a normal curve. You should be able to slip your hand in between the floor and the small of your back, with your palm touching the floor and your back touching the back of your hand. 2. Tighten the thigh muscles in your affected leg by pressing the back of your knee flat down to the floor. Hold your knee straight. 3. Keeping the thigh muscles tight and your leg straight, lift your affected leg up so that your heel is about 12 inches off the floor. Hold for about 6 seconds, then lower slowly. 4. Relax for up to 10 seconds between repetitions. 5. Repeat 8 to 12 times. 6. Switch legs and repeat steps 1 through 5, even if only one knee is sore. Active knee flexion 1. Lie on your stomach with your knees straight. If your kneecap is uncomfortable, roll up a washcloth and put it under your leg just above your kneecap.  
2. Lift the foot of your affected leg by bending the knee so that you bring the foot up toward your buttock. If this motion hurts, try it without bending your knee quite as far. This may help you avoid any painful motion. 3. Slowly move your leg up and down. 4. Repeat 8 to 12 times. 5. Switch legs and repeat steps 1 through 4, even if only one knee is sore. Quadriceps stretch (facedown) 1. Lie flat on your stomach, and rest your face on the floor. 2. Wrap a towel or belt strap around the lower part of your affected leg. Then use the towel or belt strap to slowly pull your heel toward your buttock until you feel a stretch. 3. Hold for about 15 to 30 seconds, then relax your leg against the towel or belt strap. 4. Repeat 2 to 4 times. 5. Switch legs and repeat steps 1 through 4, even if only one knee is sore. Stationary exercise bike If you do not have a stationary exercise bike at home, you can find one to ride at your local health club or community center. 1. Adjust the height of the bike seat so that your knee is slightly bent when your leg is extended downward. If your knee hurts when the pedal reaches the top, you can raise the seat so that your knee does not bend as much. 2. Start slowly. At first, try to do 5 to 10 minutes of cycling with little to no resistance. Then increase your time and the resistance bit by bit until you can do 20 to 30 minutes without pain. 3. If you start to have pain, rest your knee until your pain gets back to the level that is normal for you. Or cycle for less time or with less effort. Follow-up care is a key part of your treatment and safety. Be sure to make and go to all appointments, and call your doctor if you are having problems. It's also a good idea to know your test results and keep a list of the medicines you take. Where can you learn more? Go to http://delgado-carlota.info/. Enter C159 in the search box to learn more about \"Knee Arthritis: Exercises. \" Current as of: March 21, 2017 Content Version: 11.3 © 7652-0849 [employer]wise, [employer]. Care instructions adapted under license by [employer] (which disclaims liability or warranty for this information). If you have questions about a medical condition or this instruction, always ask your healthcare professional. [address] any warranty or liability for your use of this information. Joint Injections: Care Instructions Your Care Instructions Joint injections are shots into a joint, such as the knee. They may be used to put in medicines, such as pain relievers. Or they can be used to take out fluid. Sometimes the fluid is tested in a lab. This can help find the cause of a joint problem. A corticosteroid, or steroid, shot is used to reduce inflammation in tendons or joints. It is often used to treat problems such as arthritis, tendinitis, and bursitis. Steroids can be injected directly into a painful, inflamed joint. They can also help reduce inflammation of a bursa. A bursa is a sac of fluid. It cushions and lubricates areas where tendons, ligaments, skin, muscles, or bones rub against each other. A steroid shot can sometimes help with short-term pain relief when other treatments haven't worked. If steroid shots help, pain may improve for weeks or months. Follow-up care is a key part of your treatment and safety. Be sure to make and go to all appointments, and call your doctor if you are having problems. It's also a good idea to know your test results and keep a list of the medicines you take. How can you care for yourself at home? · Put ice or a cold pack on the area for 10 to 20 minutes at a time. Put a thin cloth between the ice and your skin. · Take anti-inflammatory medicines to reduce pain, swelling, or inflammation. These include ibuprofen (Advil, Motrin) and naproxen (Aleve). Read and follow all instructions on the label.  
· Avoid strenuous activities for several days, especially those that put stress on the area where you got the shot. · If you have dressings over the area, keep them clean and dry. You may remove them when your doctor tells you to. When should you call for help? Call your doctor now or seek immediate medical care if: 
· You have signs of infection, such as: 
¨ Increased pain, swelling, warmth, or redness. ¨ Red streaks leading from the site. ¨ Pus draining from the site. ¨ A fever. Watch closely for changes in your health, and be sure to contact your doctor if you have any problems. Where can you learn more? Go to http://delgado-carlota.info/. Enter N616 in the search box to learn more about \"Joint Injections: Care Instructions. \" Current as of: March 21, 2017 Content Version: 11.3 © 9238-2920 [employer]. Care instructions adapted under license by [employer] (which disclaims liability or warranty for this information). If you have questions about a medical condition or this instruction, always ask your healthcare professional. [address] any warranty or liability for your use of this information. Introducing [facility]! Hortencia Jay introduces Hexagram 49 patient portal. Now you can access parts of your medical record, email your doctor's office, and request medication refills online. 1. In your internet browser, go to [email or web address] 2. Click on the First Time User? Click Here link in the Sign In box. You will see the New Member Sign Up page. 3. Enter your Hexagram 49 Access Code exactly as it appears below. You will not need to use this code after youve completed the sign-up process. If you do not sign up before the expiration date, you must request a new code. · Hexagram 49 Access Code: [email or web address] Expires: 9/10/2017  2:21 PM 
 
4. Enter the last four digits of your Social Security Number (xxxx) and Date of Birth (mm/dd/yyyy) as indicated and click Submit.  You will be taken to the next sign-up page. 5. Create a Mopapp ID. This will be your Mopapp login ID and cannot be changed, so think of one that is secure and easy to remember. 6. Create a Mopapp password. You can change your password at any time. 7. Enter your Password Reset Question and Answer. This can be used at a later time if you forget your password. 8. Enter your e-mail address. You will receive e-mail notification when new information is available in [address]. 9. Click Sign Up. You can now view and download portions of your medical record. 10. Click the Download Summary menu link to download a portable copy of your medical information. If you have questions, please visit the Frequently Asked Questions section of the Mopapp website. Remember, Mopapp is NOT to be used for urgent needs. For medical emergencies, dial 911. Now available from your iPhone and Android! Please provide this summary of care documentation to your next provider. If you have any questions after today's visit, please call [phone].

## 2017-08-28 NOTE — LETTER
8/28/2017 2:12 PM 
 
Ms. Ector Mcmillan [address] 
[address] [address] Full work restrictions for injuries to the Thigh, Knee, Leg, Ankle, and Foot PATIENT'S NAME: Ector Griffin   DATE: 8/28/2017 LIFTING:   The patient can lift no more than 20 pounds. CLIMBING:   The patient can climb no ladders or stairs WALKING/STANDING The patient can walk or stand no more than 1 hour at a time. The patient cannot walk on uneven ground or on scaffolding. KNEELING/SQUATTING The patient cannot kneel or squat These restrictions are in effect for the above named patient From: 8/28/2017  TO:PERMANENT Sincerely, 
 
 
Lauren Malone MD

## 2017-10-05 ENCOUNTER — TELEPHONE (OUTPATIENT)
Dept: SURGERY | Age: 42
End: 2017-10-05

## 2017-10-05 NOTE — TELEPHONE ENCOUNTER
JAYDENM to confirm pt had the video. I emailed it again after receiving a message from [facility]. stating the patient called and said she didn't have it.

## 2017-10-09 ENCOUNTER — OFFICE VISIT (OUTPATIENT)
Dept: PAIN MANAGEMENT | Age: 42
End: 2017-10-09

## 2017-10-09 VITALS
RESPIRATION RATE: 14 BRPM | SYSTOLIC BLOOD PRESSURE: 131 MMHG | HEART RATE: 93 BPM | BODY MASS INDEX: 37.03 KG/M2 | TEMPERATURE: 97.2 F | HEIGHT: 69 IN | WEIGHT: 250 LBS | DIASTOLIC BLOOD PRESSURE: 90 MMHG

## 2017-10-09 DIAGNOSIS — G89.29 CHRONIC PAIN OF BOTH KNEES: ICD-10-CM

## 2017-10-09 DIAGNOSIS — G89.29 CHRONIC PAIN OF LEFT KNEE: ICD-10-CM

## 2017-10-09 DIAGNOSIS — M25.562 CHRONIC PAIN OF LEFT KNEE: ICD-10-CM

## 2017-10-09 DIAGNOSIS — G89.4 CHRONIC PAIN SYNDROME: ICD-10-CM

## 2017-10-09 DIAGNOSIS — M25.561 CHRONIC PAIN OF BOTH KNEES: ICD-10-CM

## 2017-10-09 DIAGNOSIS — M25.562 CHRONIC PAIN OF BOTH KNEES: ICD-10-CM

## 2017-10-09 DIAGNOSIS — M22.42 CHONDROMALACIA OF LEFT PATELLA: ICD-10-CM

## 2017-10-09 RX ORDER — METHOCARBAMOL 500 MG/1
500 TABLET, FILM COATED ORAL 3 TIMES DAILY
Qty: 90 TAB | Refills: 3 | Status: SHIPPED | OUTPATIENT
Start: 2017-12-06 | End: 2017-10-12 | Stop reason: ALTCHOICE

## 2017-10-09 RX ORDER — AMITRIPTYLINE HYDROCHLORIDE 25 MG/1
TABLET, FILM COATED ORAL
Qty: 30 TAB | Refills: 2 | Status: SHIPPED | OUTPATIENT
Start: 2017-10-17 | End: 2018-01-08 | Stop reason: SDUPTHER

## 2017-10-09 RX ORDER — TEMAZEPAM 15 MG/1
15 CAPSULE ORAL
Qty: 30 CAP | Refills: 3 | Status: SHIPPED | OUTPATIENT
Start: 2017-12-23 | End: 2018-04-09 | Stop reason: SDUPTHER

## 2017-10-09 NOTE — PROGRESS NOTES
Nursing Notes    Patient presents to the office today in follow-up. Patient rates her pain at 8/10 on the numerical pain scale. Reviewed medications with counts as follows:    Rx Date filled Qty Dispensed Pill Count Last Dose Short   opana ER 20mg 09/09/17 90 0 today no   nucynta 75mg 09/09/17 90 0 today no                                  Comments:     POC UDS was not performed in office today    Any new labs or imaging since last appointment? YESlab    Have you been to an emergency room (ER) or urgent care clinic since your last visit? [place] seizures              Have you been hospitalized since your last visit? NO     If yes, where, when, and reason for visit? Have you seen or consulted any other health care providers outside of the [address] [place] [address]  since your last visit? YES     If yes, where, when, and reason for visit? HM deferred to pcp.

## 2017-10-09 NOTE — PATIENT INSTRUCTIONS
1. Continue current plan with no evidence of addiction or diversion. Stable on current medication without adverse events. 2. Refill Opana ER 20 mg 3 times daily. 3. Refill Nucynta 75 mg up to 3 times daily as needed. 4. Refill Restoril 15 mg capsule once nightly as needed for sleep. With 3 refills  5. Refill Elavil 25 mg tablet once nightly as needed for sleep with 2 refills. Please discuss further refills of this medication with your PCP. Do not use along with Nucynta. 6. Refill Robaxin 500 mg tablet up to 3 times daily. 3 refills. 7. Naloxone 4 mg nasal spray for opioid induced respiratory depression emergency only. 8. Continue to f/u Orthopedic specialist.   9. Please contact our office within 72 hours if you receive outside controlled substance for postsurgical pain. 10. Discussed risks of addiction, dependency, and opioid induced hyperalgesia. 11. Return to clinic in 3 months          Preventing Falls: Care Instructions  Your Care Instructions  Getting around your home safely can be a challenge if you have injuries or health problems that make it easy for you to fall. Loose rugs and furniture in walkways are among the dangers for many older people who have problems walking or who have poor eyesight. People who have conditions such as arthritis, osteoporosis, or dementia also have to be careful not to fall. You can make your home safer with a few simple measures. Follow-up care is a key part of your treatment and safety. Be sure to make and go to all appointments, and call your doctor if you are having problems. It's also a good idea to know your test results and keep a list of the medicines you take. How can you care for yourself at home? Taking care of yourself  · You may get dizzy if you do not drink enough water. To prevent dehydration, drink plenty of fluids, enough so that your urine is light yellow or clear like water. Choose water and other caffeine-free clear liquids.  If you have kidney, heart, or liver disease and have to limit fluids, talk with your doctor before you increase the amount of fluids you drink. · Exercise regularly to improve your strength, muscle tone, and balance. Walk if you can. Swimming may be a good choice if you cannot walk easily. · Have your vision and hearing checked each year or any time you notice a change. If you have trouble seeing and hearing, you might not be able to avoid objects and could lose your balance. · Know the side effects of the medicines you take. Ask your doctor or pharmacist whether the medicines you take can affect your balance. Sleeping pills or sedatives can affect your balance. · Limit the amount of alcohol you drink. Alcohol can impair your balance and other senses. · Ask your doctor whether calluses or corns on your feet need to be removed. If you wear loose-fitting shoes because of calluses or corns, you can lose your balance and fall. · Talk to your doctor if you have numbness in your feet. Preventing falls at home  · Remove raised doorway thresholds, throw rugs, and clutter. Repair loose carpet or raised areas in the floor. · Move furniture and electrical cords to keep them out of walking paths. · Use nonskid floor wax, and wipe up spills right away, especially on ceramic tile floors. · If you use a walker or cane, put rubber tips on it. If you use crutches, clean the bottoms of them regularly with an abrasive pad, such as steel wool. · Keep your house well lit, especially [place], and outside walkways. Use night-lights in areas such as hallways and bathrooms. Add extra light switches or use remote switches (such as switches that go on or off when you clap your hands) to make it easier to turn lights on if you have to get up during the night. · Install sturdy handrails on stairways. · Move items in your cabinets so that the things you use a lot are on the lower shelves (about waist level).   · Keep a cordless phone and a flashlight with new batteries by your bed. If possible, put a phone in each of the main rooms of your house, or carry a cell phone in case you fall and cannot reach a phone. Or, you can wear a device around your neck or wrist. You push a button that sends a signal for help. · Wear low-heeled shoes that fit well and give your feet good support. Use footwear with nonskid soles. Check the heels and soles of your shoes for wear. Repair or replace worn heels or soles. · Do not wear socks without shoes on wood floors. · Walk on the grass when the sidewalks are slippery. If you live in an area that gets snow and ice in the winter, sprinkle salt on slippery steps and sidewalks. Preventing falls in the bath  · Install grab bars and nonskid mats inside and outside your shower or tub and near the toilet and sinks. · Use shower chairs and bath benches. · Use a hand-held shower head that will allow you to sit while showering. · Get into a tub or shower by putting the weaker leg in first. Get out of a tub or shower with your strong side first.  · Repair loose toilet seats and consider installing a raised toilet seat to make getting on and off the toilet easier. · Keep your bathroom door unlocked while you are in the shower. Where can you learn more? Go to http://delgado-carlota.info/. Enter [id number] [id number] in the search box to learn more about \"Preventing Falls: Care Instructions. \"  Current as of: August 4, 2016  Content Version: 11.3  © 1684-1309 [employer]. Care instructions adapted under license by [employer] (which disclaims liability or warranty for this information). If you have questions about a medical condition or this instruction, always ask your healthcare professional. [place] [address] any warranty or liability for your use of this information.

## 2017-10-09 NOTE — PROGRESS NOTES
HISTORY OF PRESENT ILLNESS  Khris Burton is a 37 y.o. female    HPI: Ms. Pratibha Donis  returns today for f/u of chronic left knee pain. 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 The pain started after a fall, March 13, 2010. She underwent one surgery, ACL reconstruction, around July, 2010. She continues unchanged since last visit. She has followed up with Dr. Sydney Caballero where she received bilateral knee aspiration and cortisone injections with some improvement. Dr. Sydney Caballero has also recommended knee replacement surgery. She was told that she will need to lose some weight prior to surgery and has been referred to bariatric surgery for consultation. She is otherwise doing well with no other complaints today. I have asked her to discuss further refills of her Elavil or alternative medications with her PCP. She verbally agrees. She does note that she has been having some difficulty getting her Nucynta filled from her pharmacist.  She will discuss this further with her pharmacy and/or insurance company as well. We will continue with her current treatment plan and have her follow-up in 3 months for further evaluation and recommendation. Current medication management consists of Opana ER 20 mg every 8 hours, Nucynta IR 75 mg 3 times a day as needed, Elavil 25 mg at night, and Restoril 30 mg at night. Medications are helping with pain control and quality of life. Her pain is 6-7/10 with medication and 10/10 without. Pt describes pain as aching. Aggravating factors include standing and walking. Relieved with rest, medication, and avoiding painful activities. Current treatment is helping to improve general activity, mood, walking, sleep, enjoyment of life    She  is otherwise doing well with no other complaints today.  She denies any adverse events including nausea, vomiting, dizziness, increased constipation, hallucinations, or seizures. Because the patient's current regimen places him/her at increased risk for possible overdose, a prescription for naloxone nasal spray has been provided. The patient understands that this medication is only to be used in the setting of a possible overdose and that inadvertent use of this medication could precipitate overt withdrawal.    Attended education class on 2016      PRIOR IMAGIN. Lumbar MRI 16: Multilevel degenerative disc disease and facet arthropathy. Moderate canal narrowing and moderate bilateral neural foramina at L3-L4. L4-L5 moderate bilateral neural foraminal narrowing with mild canal narrowing. No Known Allergies    Past Surgical History:   Procedure Laterality Date    HX ACL RECONSTRUCTION      HX HERNIA REPAIR      HX KNEE ARTHROSCOPY           Review of Systems   Constitutional: Negative for chills and fever. HENT: Negative for congestion and sore throat. Eyes: Negative for blurred vision and double vision. Respiratory: Positive for shortness of breath. Negative for cough and wheezing. Cardiovascular: Positive for chest pain and leg swelling. Negative for palpitations. Gastrointestinal: Positive for heartburn. Negative for abdominal pain, constipation, nausea and vomiting. Genitourinary: Negative for dysuria and urgency. Musculoskeletal: Positive for back pain and joint pain. Skin: Negative for itching and rash. Neurological: Positive for weakness and headaches. Negative for dizziness, seizures and loss of consciousness. Endo/Heme/Allergies: Negative for environmental allergies. Does not bruise/bleed easily. Psychiatric/Behavioral: Positive for depression. Negative for suicidal ideas. The patient is nervous/anxious and has insomnia. Physical Exam   Constitutional: She is oriented to person, place, and time and well-developed, well-nourished, and in no distress. No distress.    obese HENT:   Head: Normocephalic and atraumatic. Eyes: EOM are normal.   Neck: Normal range of motion. Pulmonary/Chest: Effort normal.   Musculoskeletal:        Left knee: She exhibits decreased range of motion and swelling. Tenderness found. Neurological: She is alert and oriented to person, place, and time. Gait ( antalgic gait) abnormal.   Skin: Skin is warm and dry. No rash noted. She is not diaphoretic. No erythema. Psychiatric: Mood, memory, affect and judgment normal.   Nursing note and vitals reviewed. ASSESSMENT:    1. Chronic pain of left knee    2. Chronic pain syndrome    3. Chondromalacia of left patella    4. Chronic pain of both knees           [place] Prescription Monitoring Program was reviewed which does not demonstrate aberrancies and/or inconsistencies with regard to the historical prescribing of controlled medications to this patient by other providers. NOTE: Disclosed Percocet filled 7/28/16 for 5 days for postsurgical pain. Discussed percocet filled 1/5/17 for post procedural pain. PLAN / Pt Instructions:  1. Continue current plan with no evidence of addiction or diversion. Stable on current medication without adverse events. 2. Refill Opana ER 20 mg 3 times daily. 3. Refill Nucynta 75 mg up to 3 times daily as needed. 4. Refill Restoril 15 mg capsule once nightly as needed for sleep. With 3 refills  5. Refill Elavil 25 mg tablet once nightly as needed for sleep with 2 refills. Please discuss further refills of this medication with your PCP. Do not use along with Nucynta. 6. Refill Robaxin 500 mg tablet up to 3 times daily. 3 refills. 7. Naloxone 4 mg nasal spray for opioid induced respiratory depression emergency only. 8. Continue to f/u Orthopedic specialist.   9. Please contact our office within 72 hours if you receive outside controlled substance for postsurgical pain. 10. Discussed risks of addiction, dependency, and opioid induced hyperalgesia. 11. Return to clinic in 3 months       Medications Ordered Today   Medications    oxyMORphone (OPANA ER) 20 mg PO ER tablet     Sig: Take 1 Tab by mouth every eight (8) hours for 30 days. Max Daily Amount: 60 mg. Dispense:  90 Tab     Refill:  0    oxyMORphone (OPANA ER) 20 mg PO ER tablet     Sig: Take 1 Tab by mouth every eight (8) hours for 30 days. Max Daily Amount: 60 mg. Dispense:  90 Tab     Refill:  0    oxyMORphone (OPANA ER) 20 mg PO ER tablet     Sig: Take 1 Tab by mouth every eight (8) hours for 30 days. Max Daily Amount: 60 mg. Dispense:  90 Tab     Refill:  0    tapentadol (NUCYNTA) 75 mg tablet     Sig: Take 75 mg by mouth three (3) times daily as needed for Pain for up to 30 days. Max Daily Amount: 225 mg. Dispense:  90 Tab     Refill:  0    tapentadol (NUCYNTA) 75 mg tablet     Sig: Take 75 mg by mouth three (3) times daily as needed for Pain for up to 30 days. Max Daily Amount: 225 mg. Dispense:  90 Tab     Refill:  0    tapentadol (NUCYNTA) 75 mg tablet     Sig: Take 75 mg by mouth three (3) times daily as needed for Pain for up to 30 days. Max Daily Amount: 225 mg. Dispense:  90 Tab     Refill:  0    amitriptyline (ELAVIL) 25 mg tablet     Sig: TAKE 1 TABLET BY MOUTH EVERY NIGHT     Dispense:  30 Tab     Refill:  2    temazepam (RESTORIL) 15 mg capsule     Sig: Take 1 Cap by mouth nightly as needed for Sleep for up to 30 days. Max Daily Amount: 15 mg. Dispense:  30 Cap     Refill:  3    methocarbamol (ROBAXIN) 500 mg tablet     Sig: Take 1 Tab by mouth three (3) times daily for 30 days. Dispense:  90 Tab     Refill:  3    oxyMORphone 20 mg PO ER tablet     Sig: Take 1 Tab by mouth every eight (8) hours for 30 days. Max Daily Amount: 60 mg. Dispense:  90 Tab     Refill:  0    oxyMORphone 20 mg PO ER tablet     Sig: Take 1 Tab by mouth every eight (8) hours for 30 days. Max Daily Amount: 60 mg.      Dispense:  90 Tab     Refill:  0    oxyMORphone 20 mg PO ER tablet     Sig: Take 1 Tab by mouth every eight (8) hours for 30 days. Max Daily Amount: 60 mg. Dispense:  90 Tab     Refill:  0       Pain medications prescribed with the objective of pain relief and improved physical and psychosocial function in this patient. Spent 25 minutes with patient today reviewing the treatment plan, goals of treatment plan, and limitations of the treatment plan, to include the potential for side effects from medications and procedures. Barbie Gregory, [id number] 10/9/2017      Note: Please excuse any typographical errors. Voice recognition software was used for this note and may cause mistakes.

## 2017-10-09 NOTE — MR AVS SNAPSHOT
Visit Information Date & Time Provider Department Dept. Phone Encounter #  
 10/9/2017  9:40 AM SHAGUFTA [facility] [phone] [id number] Follow-up Instructions Return in about 3 months (around 1/9/2018). Your Appointments 10/12/2017  3:00 PM  
GASTRIC BYPASS VISIT with Author MD Raffy  
[facility] Appt Note: Online Video                   [place] #[id number]  
 [address] [facility] [address]  
  
   
 [address] [facility] [address] [facility] 951 Upcoming Health Maintenance Date Due DTaP/Tdap/Td series (1 - Tdap) 11/23/1997 PAP AKA CERVICAL CYTOLOGY 11/23/1997 INFLUENZA AGE 9 TO ADULT 8/1/2017 Allergies as of 10/9/2017  Review Complete On: 10/9/2017 By: SHAGUFTA Goodrich No Known Allergies Current Immunizations  Never Reviewed No immunizations on file. Not reviewed this visit You Were Diagnosed With   
  
 Codes Comments Chronic pain of left knee     ICD-10-CM: M25.562, G89.29 ICD-9-CM: 719.46, 338.29 Chronic pain syndrome     ICD-10-CM: G89.4 ICD-9-CM: 338. 4 Chondromalacia of left patella     ICD-10-CM: M22.42 
ICD-9-CM: 717.7 Chronic pain of both knees     ICD-10-CM: M25.561, M25.562, G89.29 ICD-9-CM: 719.46, 338.29 Vitals BP Pulse Temp Resp Height(growth percentile) Weight(growth percentile) 131/90 93 97.2 °F (36.2 °C) 14 5' 9\" (1.753 m) 250 lb (113.4 kg) LMP BMI OB Status Smoking Status 07/23/2016 36.92 kg/m2 Hysterectomy Never Smoker BMI and BSA Data Body Mass Index Body Surface Area  
 36.92 kg/m 2 2.35 m 2 Preferred Pharmacy Pharmacy Name Phone [facility] [address] [place] [address] Your Updated Medication List  
  
   
 This list is accurate as of: 10/9/17 10:54 AM.  Always use your most recent med list.  
  
  
  
  
 amitriptyline 25 mg tablet Commonly known as:  ELAVIL TAKE 1 TABLET BY MOUTH EVERY NIGHT Start taking on:  10/17/2017  
  
 bisacodyl 10 mg suppository Commonly known as:  DULCOLAX Insert 10 mg into rectum daily. enoxaparin 40 mg/0.4 mL Commonly known as:  LOVENOX  
0.4 mL by SubCUTAneous route every twenty-four (24) hours. gabapentin 300 mg capsule Commonly known as:  NEURONTIN Take 300 mg by mouth three (3) times daily. ibuprofen 600 mg tablet Commonly known as:  MOTRIN Take 1 Tab by mouth every six (6) hours as needed. losartan-hydroCHLOROthiazide 100-25 mg per tablet Commonly known as:  HYZAAR Take 1 Tab by mouth daily. methocarbamol 500 mg tablet Commonly known as:  ROBAXIN Take 1 Tab by mouth three (3) times daily for 30 days. Start taking on:  12/6/2017  
  
 naloxone 4 mg/actuation nasal spray Commonly known as:  NARCAN  
4 mg by Nasal route as needed. Spray contents of 1 nasal spray in nostril as one single dose. May repeat in 2-3 minutes in alternating nostril until EMS arrives. Indications: OPIATE-INDUCED RESPIRATORY DEPRESSION  
  
 NIFEdipine ER 30 mg ER tablet Commonly known as:  ADALAT CC Take  by mouth daily. * oxyMORphone 20 mg ER tablet Commonly known as:  OPANA ER Take 1 Tab by mouth every eight (8) hours for 30 days. Max Daily Amount: 60 mg.  
  
 * oxyMORphone 20 mg ER tablet Commonly known as:  OPANA ER Take 1 Tab by mouth every eight (8) hours for 30 days. Max Daily Amount: 60 mg.  
  
 * oxyMORphone 20 mg ER tablet Commonly known as:  OPANA ER Take 1 Tab by mouth every eight (8) hours for 30 days. Max Daily Amount: 60 mg. Start taking on:  11/8/2017 * oxyMORphone 20 mg ER tablet Commonly known as:  OPANA ER Take 1 Tab by mouth every eight (8) hours for 30 days. Max Daily Amount: 60 mg. Start taking on:  11/8/2017 * oxyMORphone 20 mg ER tablet Commonly known as:  OPANA ER Take 1 Tab by mouth every eight (8) hours for 30 days. Max Daily Amount: 60 mg. Start taking on:  12/7/2017 * oxyMORphone 20 mg ER tablet Commonly known as:  OPANA ER Take 1 Tab by mouth every eight (8) hours for 30 days. Max Daily Amount: 60 mg. Start taking on:  12/7/2017 RANEXA 500 mg SR tablet Generic drug:  ranolazine ER Take 1,000 mg by mouth two (2) times a day. * tapentadol 75 mg tablet Commonly known as:  Macy Rudolph Take 75 mg by mouth three (3) times daily as needed for Pain for up to 30 days. Max Daily Amount: 225 mg.  
  
 * tapentadol 75 mg tablet Commonly known as:  Macy Rudolph Take 75 mg by mouth three (3) times daily as needed for Pain for up to 30 days. Max Daily Amount: 225 mg. Start taking on:  11/8/2017 * tapentadol 75 mg tablet Commonly known as:  Macy Rudolph Take 75 mg by mouth three (3) times daily as needed for Pain for up to 30 days. Max Daily Amount: 225 mg. Start taking on:  12/7/2017  
  
 temazepam 15 mg capsule Commonly known as:  RESTORIL Take 1 Cap by mouth nightly as needed for Sleep for up to 30 days. Max Daily Amount: 15 mg. Start taking on:  12/23/2017 TOPAMAX 50 mg tablet Generic drug:  topiramate Take  by mouth two (2) times a day. VITAMIN D2 50,000 unit capsule Generic drug:  ergocalciferol Take 50,000 Units by mouth every seven (7) days. ZOLOFT 50 mg tablet Generic drug:  sertraline Take  by mouth daily. * Notice: This list has 9 medication(s) that are the same as other medications prescribed for you. Read the directions carefully, and ask your doctor or other care provider to review them with you. Prescriptions Printed Refills  
 oxyMORphone (OPANA ER) 20 mg PO ER tablet 0 Sig: Take 1 Tab by mouth every eight (8) hours for 30 days. Max Daily Amount: 60 mg.  
 Class: Print Route: Oral  
 oxyMORphone (OPANA ER) 20 mg PO ER tablet 0 Starting on: 11/8/2017 Sig: Take 1 Tab by mouth every eight (8) hours for 30 days. Max Daily Amount: 60 mg.  
 Class: Print Route: Oral  
 oxyMORphone (OPANA ER) 20 mg PO ER tablet 0 Starting on: 12/7/2017 Sig: Take 1 Tab by mouth every eight (8) hours for 30 days. Max Daily Amount: 60 mg.  
 Class: Print Route: Oral  
 tapentadol (NUCYNTA) 75 mg tablet 0 Sig: Take 75 mg by mouth three (3) times daily as needed for Pain for up to 30 days. Max Daily Amount: 225 mg.  
 Class: Print Route: Oral  
 tapentadol (NUCYNTA) 75 mg tablet 0 Starting on: 11/8/2017 Sig: Take 75 mg by mouth three (3) times daily as needed for Pain for up to 30 days. Max Daily Amount: 225 mg.  
 Class: Print Route: Oral  
 tapentadol (NUCYNTA) 75 mg tablet 0 Starting on: 12/7/2017 Sig: Take 75 mg by mouth three (3) times daily as needed for Pain for up to 30 days. Max Daily Amount: 225 mg.  
 Class: Print Route: Oral  
 temazepam (RESTORIL) 15 mg capsule 3 Starting on: 12/23/2017 Sig: Take 1 Cap by mouth nightly as needed for Sleep for up to 30 days. Max Daily Amount: 15 mg.  
 Class: Print Route: Oral  
 oxyMORphone 20 mg PO ER tablet 0 Sig: Take 1 Tab by mouth every eight (8) hours for 30 days. Max Daily Amount: 60 mg.  
 Class: Print Route: Oral  
 oxyMORphone 20 mg PO ER tablet 0 Starting on: 11/8/2017 Sig: Take 1 Tab by mouth every eight (8) hours for 30 days. Max Daily Amount: 60 mg.  
 Class: Print Route: Oral  
 oxyMORphone 20 mg PO ER tablet 0 Starting on: 12/7/2017 Sig: Take 1 Tab by mouth every eight (8) hours for 30 days. Max Daily Amount: 60 mg.  
 Class: Print Route: Oral  
  
Prescriptions Sent to Pharmacy Refills  
 amitriptyline (ELAVIL) 25 mg tablet 2 Starting on: 10/17/2017 Sig: TAKE 1 TABLET BY MOUTH EVERY NIGHT  Class: Normal  
 Pharmacy: [address], [address] [place] [address] Ph #: [phone]  
 methocarbamol (ROBAXIN) 500 mg tablet 3 Starting on: 12/6/2017 Sig: Take 1 Tab by mouth three (3) times daily for 30 days. Class: Normal  
 Pharmacy: [facility] [address] [place] [address], [address] [place] [address] Ph #: [phone] Route: Oral  
  
Follow-up Instructions Return in about 3 months (around 1/9/2018). Patient Instructions 1. Continue current plan with no evidence of addiction or diversion. Stable on current medication without adverse events. 2. Refill Opana ER 20 mg 3 times daily. 3. Refill Nucynta 75 mg up to 3 times daily as needed. 4. Refill Restoril 15 mg capsule once nightly as needed for sleep. With 3 refills 5. Refill Elavil 25 mg tablet once nightly as needed for sleep with 2 refills. Please discuss further refills of this medication with your PCP. Do not use along with Nucynta. 6. Refill Robaxin 500 mg tablet up to 3 times daily. 3 refills. 7. Naloxone 4 mg nasal spray for opioid induced respiratory depression emergency only. 8. Continue to f/u Orthopedic specialist.  
9. Please contact our office within 72 hours if you receive outside controlled substance for postsurgical pain. 10. Discussed risks of addiction, dependency, and opioid induced hyperalgesia. 11. Return to clinic in 3 months Preventing Falls: Care Instructions Your Care Instructions Getting around your home safely can be a challenge if you have injuries or health problems that make it easy for you to fall. Loose rugs and furniture in walkways are among the dangers for many older people who have problems walking or who have poor eyesight. People who have conditions such as arthritis, osteoporosis, or dementia also have to be careful not to fall. You can make your home safer with a few simple measures. Follow-up care is a key part of your treatment and safety. Be sure to make and go to all appointments, and call your doctor if you are having problems. It's also a good idea to know your test results and keep a list of the medicines you take. How can you care for yourself at home? Taking care of yourself · You may get dizzy if you do not drink enough water. To prevent dehydration, drink plenty of fluids, enough so that your urine is light yellow or clear like water. Choose water and other caffeine-free clear liquids. If you have kidney, heart, or liver disease and have to limit fluids, talk with your doctor before you increase the amount of fluids you drink. · Exercise regularly to improve your strength, muscle tone, and balance. Walk if you can. Swimming may be a good choice if you cannot walk easily. · Have your vision and hearing checked each year or any time you notice a change. If you have trouble seeing and hearing, you might not be able to avoid objects and could lose your balance. · Know the side effects of the medicines you take. Ask your doctor or pharmacist whether the medicines you take can affect your balance. Sleeping pills or sedatives can affect your balance. · Limit the amount of alcohol you drink. Alcohol can impair your balance and other senses. · Ask your doctor whether calluses or corns on your feet need to be removed. If you wear loose-fitting shoes because of calluses or corns, you can lose your balance and fall. · Talk to your doctor if you have numbness in your feet. Preventing falls at home · Remove raised doorway thresholds, throw rugs, and clutter. Repair loose carpet or raised areas in the floor. · Move furniture and electrical cords to keep them out of walking paths. · Use nonskid floor wax, and wipe up spills right away, especially on ceramic tile floors. · If you use a walker or cane, put rubber tips on it.  If you use crutches, clean the bottoms of them regularly with an abrasive pad, such as steel wool. · Keep your house well lit, especially [place], and outside walkways. Use night-lights in areas such as hallways and bathrooms. Add extra light switches or use remote switches (such as switches that go on or off when you clap your hands) to make it easier to turn lights on if you have to get up during the night. · Install sturdy handrails on stairways. · Move items in your cabinets so that the things you use a lot are on the lower shelves (about waist level). · Keep a cordless phone and a flashlight with new batteries by your bed. If possible, put a phone in each of the main rooms of your house, or carry a cell phone in case you fall and cannot reach a phone. Or, you can wear a device around your neck or wrist. You push a button that sends a signal for help. · Wear low-heeled shoes that fit well and give your feet good support. Use footwear with nonskid soles. Check the heels and soles of your shoes for wear. Repair or replace worn heels or soles. · Do not wear socks without shoes on wood floors. · Walk on the grass when the sidewalks are slippery. If you live in an area that gets snow and ice in the winter, sprinkle salt on slippery steps and sidewalks. Preventing falls in the bath · Install grab bars and nonskid mats inside and outside your shower or tub and near the toilet and sinks. · Use shower chairs and bath benches. · Use a hand-held shower head that will allow you to sit while showering. · Get into a tub or shower by putting the weaker leg in first. Get out of a tub or shower with your strong side first. 
· Repair loose toilet seats and consider installing a raised toilet seat to make getting on and off the toilet easier. · Keep your bathroom door unlocked while you are in the shower. Where can you learn more? Go to http://delgado-carlota.info/. Enter [phone] in the search box to learn more about \"Preventing Falls: Care Instructions. \" Current as of: August 4, 2016 Content Version: 11.3 © 4305-3595 [employer]. Care instructions adapted under license by [employer] (which disclaims liability or warranty for this information). If you have questions about a medical condition or this instruction, always ask your healthcare professional. [place] [address] any warranty or liability for your use of this information. Introducing [facility]! [employer] introduces [employer] patient portal. Now you can access parts of your medical record, email your doctor's office, and request medication refills online. 1. In your internet browser, go to [email or web address] 2. Click on the First Time User? Click Here link in the Sign In box. You will see the New Member Sign Up page. 3. Enter your [employer] Access Code exactly as it appears below. You will not need to use this code after youve completed the sign-up process. If you do not sign up before the expiration date, you must request a new code. · [employer] Access Code: [email or web address] Expires: 1/7/2018 10:54 AM 
 
4. Enter the last four digits of your Social Security Number (xxxx) and Date of Birth (mm/dd/yyyy) as indicated and click Submit. You will be taken to the next sign-up page. 5. Create a [employer] ID. This will be your [employer] login ID and cannot be changed, so think of one that is secure and easy to remember. 6. Create a [employer] password. You can change your password at any time. 7. Enter your Password Reset Question and Answer. This can be used at a later time if you forget your password. 8. Enter your e-mail address. You will receive e-mail notification when new information is available in [address] 9. Click Sign Up. You can now view and download portions of your medical record. 10. Click the Download Summary menu link to download a portable copy of your medical information. If you have questions, please visit the Frequently Asked Questions section of the [employer] website. Remember, [employer] is NOT to be used for urgent needs. For medical emergencies, dial 911. Now available from your iPhone and Android! Please provide this summary of care documentation to your next provider. If you have any questions after today's visit, please call [phone].

## 2017-10-12 ENCOUNTER — OFFICE VISIT (OUTPATIENT)
Dept: SURGERY | Age: 42
End: 2017-10-12

## 2017-10-12 VITALS
RESPIRATION RATE: 20 BRPM | WEIGHT: 255 LBS | SYSTOLIC BLOOD PRESSURE: 113 MMHG | TEMPERATURE: 98 F | HEART RATE: 102 BPM | HEIGHT: 69 IN | DIASTOLIC BLOOD PRESSURE: 86 MMHG | BODY MASS INDEX: 37.77 KG/M2

## 2017-10-12 DIAGNOSIS — M25.561 CHRONIC PAIN OF BOTH KNEES: ICD-10-CM

## 2017-10-12 DIAGNOSIS — E66.01 MORBID OBESITY (HCC): ICD-10-CM

## 2017-10-12 DIAGNOSIS — I10 ESSENTIAL HYPERTENSION: ICD-10-CM

## 2017-10-12 DIAGNOSIS — G47.33 OSA (OBSTRUCTIVE SLEEP APNEA): ICD-10-CM

## 2017-10-12 DIAGNOSIS — G62.9 NEUROPATHY: ICD-10-CM

## 2017-10-12 DIAGNOSIS — M25.562 CHRONIC PAIN OF BOTH KNEES: ICD-10-CM

## 2017-10-12 DIAGNOSIS — E66.01 MORBID OBESITY (HCC): Primary | ICD-10-CM

## 2017-10-12 DIAGNOSIS — G89.29 CHRONIC PAIN OF BOTH KNEES: ICD-10-CM

## 2017-10-12 NOTE — PROGRESS NOTES
Wade Zaman is a 37 y.o. female who presents today with   Chief Complaint   Patient presents with    Morbid Obesity     consult       Body mass index is 37.66 kg/(m^2). 1. Have you been to the ER, urgent care clinic since your last visit? Hospitalized since your last visit? No    2. Have you seen or consulted any other health care providers outside of the [address] [place] [address] since your last visit? Include any pap smears or colon screening.  No complains of

## 2017-10-12 NOTE — PROGRESS NOTES
Initial Consultation for Bariatric Surgery Template (Gastric Bypass)    Alin Wilkerson is a 37 y.o. female who comes into the office today for initial consultation for the surgical options for the treatment of morbid obesity. The patient initially identified obesity at the age of 26 and at age 26 weighed 180 lbs. She has never tried any weight loss trial.     Today, the patient is  Height: 5' 9\" (175.3 cm) tall, Weight: 115.7 kg (255 lb) lbs for a Body mass index is 37.66 kg/(m^2). It is due to the patient's severe obesity, which is further complicated by hypertension, hyperlipidemia, obstructive sleep apnea - CPAP and weight related arthopathies  that the patient is now seeking out bariatric surgery because she was told to lose weight before she can have knee surgery. She describes eating fried foods and sugars. She only drinks soft drinks. Past Medical History:   Diagnosis Date    Arthritis     Back pain     Difficulty urinating     Dizziness     DUE TO HEADACHES    Hypertension     Joint pain     Migraine     Morbid obesity ([address]     Pre-diabetes     Seizure ([address]     Sleep apnea     CPAP    [facility] [address]     2010       Past Surgical History:   Procedure Laterality Date    HX ACL RECONSTRUCTION      HX HERNIA REPAIR  77/3105    umbilical     HX HYSTERECTOMY  08/2016    TLH with BSO    HX KNEE ARTHROSCOPY         Current Outpatient Prescriptions   Medication Sig Dispense Refill    oxyMORphone (OPANA ER) 20 mg PO ER tablet Take 1 Tab by mouth every eight (8) hours for 30 days. Max Daily Amount: 60 mg. 90 Tab 0    tapentadol (NUCYNTA) 75 mg tablet Take 75 mg by mouth three (3) times daily as needed for Pain for up to 30 days. Max Daily Amount: 225 mg. 90 Tab 0    [START ON 10/17/2017] amitriptyline (ELAVIL) 25 mg tablet TAKE 1 TABLET BY MOUTH EVERY NIGHT 30 Tab 2    [START ON 12/23/2017] temazepam (RESTORIL) 15 mg capsule Take 1 Cap by mouth nightly as needed for Sleep for up to 30 days. Max Daily Amount: 15 mg. 30 Cap 3    sertraline (ZOLOFT) 50 mg tablet Take  by mouth daily.  topiramate (TOPAMAX) 50 mg tablet Take  by mouth two (2) times a day.  naloxone (NARCAN) 4 mg/actuation spry 4 mg by Nasal route as needed. Spray contents of 1 nasal spray in nostril as one single dose. May repeat in 2-3 minutes in alternating nostril until EMS arrives. Indications: OPIATE-INDUCED RESPIRATORY DEPRESSION 1 Box 1    losartan-hydrochlorothiazide (HYZAAR) 100-25 mg per tablet Take 1 Tab by mouth daily.  NIFEdipine ER (ADALAT CC) 30 mg ER tablet Take  by mouth daily.  ranolazine ER (RANEXA) 500 mg SR tablet Take 1,000 mg by mouth two (2) times a day.  ergocalciferol (VITAMIN D2) 50,000 unit capsule Take 50,000 Units by mouth every seven (7) days.  gabapentin (NEURONTIN) 300 mg capsule Take 300 mg by mouth three (3) times daily.          No Known Allergies    Social History   Substance Use Topics    Smoking status: Former Smoker    Smokeless tobacco: Never Used    Alcohol use No       Family History   Problem Relation Age of Onset    Heart Attack Father     Hypertension Mother        Family Status   Relation Status    Father [de-identified]    Mother Alive       Review of Systems:  Positive in BOLD    CONST: Fever, weight loss, fatigue or chills  GI: Nausea, vomiting, abdominal pain, change in bowel habits, hematochezia, melena, and GERD   INTEG: Dermatitis, abnormal moles  HEENT: Recent changes in vision, vertigo, epistaxis, dysphagia and hoarseness  CV: Chest pain, palpitations, HTN, edema and varicosities  RESP: Cough, shortness of breath, wheezing, hemoptysis, snoring and reactive airway disease  : Hematuria, dysuria, frequency, urgency, nocturia and stress urinary incontinence   MS: Weakness, joint pain and arthritis  ENDO: Diabetes, thyroid disease, polyuria, polydipsia, polyphagia, poor wound healing, heat intolerance, cold intolerance  LYMPH/HEME: Anemia, bruising and history of blood transfusions  NEURO: Dizziness, headache, fainting, seizures and stroke, neuropathy  PSYCH: Anxiety and depression      Physical Exam    Visit Vitals    /86 (BP 1 Location: Left arm, BP Patient Position: At rest)    Pulse (!) 102    Temp 98 °F (36.7 °C) (Oral)    Resp 20    Ht 5' 9\" (1.753 m)    Wt 115.7 kg (255 lb)    LMP 07/23/2016    BMI 37.66 kg/m2             General: 37 y.o.) female in no acute distress. Morbidly obese in breasts and abdomen - mixed pattern  HEENT: Normocephalic, atraumatic, Pupils equal and reactive, nasopharynx clear, oropharynx clear and moist without lesions  NECK: Supple, no lymphadenopathy, thyromegaly, carotid bruits or jugular venous distension. trachea midline  RESP: Clear to auscultation bilaterally, no wheezes, rhonchi, or rales, normal respiratory excursion  CV: Regular rate and rhythm, no murmurs, rubs or gallops. 3+/4 pulses in bilateral dorsalis pedis and posterior tibialis. No distal edema or varicosities. ABD: Soft, nontender, nondistended, normoactive bowel sounds, no hernias, no hepatosplenomegaly, easily palpable costal margins, mixed distribution  Extremities: Warm, well perfused, no tenderness or swelling, normal gait/station  Neuro: Sensation and strength grossly intact and symmetrical  Psych: Alert and oriented to person, place, and time. Impression:    Marques Morin is a 37 y.o. female who is suffering from morbid obesity with a BMI of 38  and comorbidities including hypertension, obstructive sleep apnea - CPAP and weight related arthopathies  who would benefit from bariatric surgery. We have had an extensive discussion with regard to the risks, benefits and likely outcomes of the operation. We've discussed the restrictive and malabsorptive nature of the gastric bypass and compared and contrasted with the sleeve gastrectomy. The patient understands the likelihood of losing approximately 80% of their excess weight in 12 to 18 months.  She also understands the risks including but not limited to bleeding, infection, need for reoperation, ulcers, leaks and strictures, bowel obstruction secondary to adhesions and internal hernias, DVT, PE, heart attack, stroke, and death. Patient also understands risks of inadequate weight loss, excess weight loss, vitamin insufficiency, protein malnutrition, excess skin, and loss of hair. We have reviewed the components of a successful postoperative course including requirement for a high protein, low carbohydrate diet, 60 oz a day of zero calorie liquids, daily vitamin supplementation, daily exercise, regular follow-up, and participation in support groups. At this time we will enroll the patient in our bariatric program, undertake routine laboratory evaluation, chest X-ray, EKG, possible UGI and evaluation by  nutritionist as well as psychologist and pending their satisfactory completion of the preop evaluation, plan to perform a gastric bypass.

## 2017-10-12 NOTE — MR AVS SNAPSHOT
Visit Information Date & Time Provider Department Dept. Phone Encounter #  
 10/12/2017  3:30 PM MD [facility] [phone] [id number] Your Appointments 1/8/2018  9:00 AM  
Follow Up with Jorja Crigler, PA [facility] (LETICIA SCHEDULING) Appt Note: return in 3 months [address] [address]  
[phone]  [id number] Upcoming Health Maintenance Date Due DTaP/Tdap/Td series (1 - Tdap) 11/23/1997 PAP AKA CERVICAL CYTOLOGY 11/23/1997 INFLUENZA AGE 9 TO ADULT 8/1/2017 Allergies as of 10/12/2017  Review Complete On: 10/9/2017 By: Jorja Crigler, PA No Known Allergies Current Immunizations  Never Reviewed No immunizations on file. Not reviewed this visit You Were Diagnosed With   
  
 Codes Comments Chronic pain of both knees    -  Primary ICD-10-CM: M25.561, M25.562, G89.29 ICD-9-CM: 719.46, 338.29 Morbid obesity ([facility] [address]     ICD-10-CM: E66.01 
ICD-9-CM: 278.01 Vitals BP Pulse Temp Resp Height(growth percentile) Weight(growth percentile) 113/86 (BP 1 Location: Left arm, BP Patient Position: At rest) (!) 102 98 °F (36.7 °C) (Oral) 20 5' 9\" (1.753 m) 255 lb (115.7 kg) LMP BMI OB Status Smoking Status 07/23/2016 37.66 kg/m2 Hysterectomy Former Smoker BMI and BSA Data Body Mass Index Body Surface Area  
 37.66 kg/m 2 2.37 m 2 Preferred Pharmacy Pharmacy Name Phone [facility] [address] [place] [address] Your Updated Medication List  
  
   
This list is accurate as of: 10/12/17  4:29 PM.  Always use your most recent med list.  
  
  
  
  
 amitriptyline 25 mg tablet Commonly known as:  ELAVIL TAKE 1 TABLET BY MOUTH EVERY NIGHT Start taking on:  10/17/2017 gabapentin 300 mg capsule Commonly known as:  NEURONTIN Take 300 mg by mouth three (3) times daily. losartan-hydroCHLOROthiazide 100-25 mg per tablet Commonly known as:  HYZAAR Take 1 Tab by mouth daily. naloxone 4 mg/actuation nasal spray Commonly known as:  NARCAN  
4 mg by Nasal route as needed. Spray contents of 1 nasal spray in nostril as one single dose. May repeat in 2-3 minutes in alternating nostril until EMS arrives. Indications: OPIATE-INDUCED RESPIRATORY DEPRESSION  
  
 NIFEdipine ER 30 mg ER tablet Commonly known as:  ADALAT CC Take  by mouth daily. oxyMORphone 20 mg ER tablet Commonly known as:  OPANA ER Take 1 Tab by mouth every eight (8) hours for 30 days. Max Daily Amount: 60 mg. RANEXA 500 mg SR tablet Generic drug:  ranolazine ER Take 1,000 mg by mouth two (2) times a day. tapentadol 75 mg tablet Commonly known as:  Tori Reichmann Take 75 mg by mouth three (3) times daily as needed for Pain for up to 30 days. Max Daily Amount: 225 mg.  
  
 temazepam 15 mg capsule Commonly known as:  RESTORIL Take 1 Cap by mouth nightly as needed for Sleep for up to 30 days. Max Daily Amount: 15 mg. Start taking on:  12/23/2017 TOPAMAX 50 mg tablet Generic drug:  topiramate Take  by mouth two (2) times a day. VITAMIN D2 50,000 unit capsule Generic drug:  ergocalciferol Take 50,000 Units by mouth every seven (7) days. ZOLOFT 50 mg tablet Generic drug:  sertraline Take  by mouth daily. Introducing [facility]! Josi Rosales introduces [employer] patient portal. Now you can access parts of your medical record, email your doctor's office, and request medication refills online. 1. In your internet browser, go to [email or web address] 2. Click on the First Time User? Click Here link in the Sign In box. You will see the New Member Sign Up page. 3. Enter your [employer] Access Code exactly as it appears below. You will not need to use this code after youve completed the sign-up process. If you do not sign up before the expiration date, you must request a new code. · [employer] Access Code: [email or web address] Expires: 1/7/2018 10:54 AM 
 
4. Enter the last four digits of your Social Security Number (xxxx) and Date of Birth (mm/dd/yyyy) as indicated and click Submit. You will be taken to the next sign-up page. 5. Create a [employer] ID. This will be your [employer] login ID and cannot be changed, so think of one that is secure and easy to remember. 6. Create a [employer] password. You can change your password at any time. 7. Enter your Password Reset Question and Answer. This can be used at a later time if you forget your password. 8. Enter your e-mail address. You will receive e-mail notification when new information is available in [address] 9. Click Sign Up. You can now view and download portions of your medical record. 10. Click the Download Summary menu link to download a portable copy of your medical information. If you have questions, please visit the Frequently Asked Questions section of the [employer] website. Remember, [employer] is NOT to be used for urgent needs. For medical emergencies, dial 911. Now available from your iPhone and Android! Please provide this summary of care documentation to your next provider. Your primary care clinician is listed as Massiel Mathur. If you have any questions after today's visit, please call [phone].

## 2017-10-12 NOTE — COMMUNICATION BODY
Initial Consultation for Bariatric Surgery Template (Gastric Bypass)    Jase Tam is a 37 y.o. female who comes into the office today for initial consultation for the surgical options for the treatment of morbid obesity. The patient initially identified obesity at the age of 26 and at age 26 weighed 180 lbs. She has never tried any weight loss trial.     Today, the patient is  Height: 5' 9\" (175.3 cm) tall, Weight: 115.7 kg (255 lb) lbs for a Body mass index is 37.66 kg/(m^2). It is due to the patient's severe obesity, which is further complicated by hypertension, MYKEL, obstructive sleep apnea - CPAP and weight related arthopathies  that the patient is now seeking out bariatric surgery because she was told to lose weight before she can have knee surgery. She describes eating fried foods and sugars. She only drinks soft drinks. Past Medical History:   Diagnosis Date    Arthritis     Back pain     Difficulty urinating     Dizziness     DUE TO HEADACHES    Hypertension     Joint pain     Migraine     Morbid obesity ([address]     Pre-diabetes     Seizure ([address]     Sleep apnea     CPAP    Stroke ([address]     2010       Past Surgical History:   Procedure Laterality Date    HX ACL RECONSTRUCTION      HX HERNIA REPAIR  16/8766    umbilical     HX HYSTERECTOMY  08/2016    TLH with BSO    HX KNEE ARTHROSCOPY         Current Outpatient Prescriptions   Medication Sig Dispense Refill    oxyMORphone (OPANA ER) 20 mg PO ER tablet Take 1 Tab by mouth every eight (8) hours for 30 days. Max Daily Amount: 60 mg. 90 Tab 0    tapentadol (NUCYNTA) 75 mg tablet Take 75 mg by mouth three (3) times daily as needed for Pain for up to 30 days. Max Daily Amount: 225 mg. 90 Tab 0    [START ON 10/17/2017] amitriptyline (ELAVIL) 25 mg tablet TAKE 1 TABLET BY MOUTH EVERY NIGHT 30 Tab 2    [START ON 12/23/2017] temazepam (RESTORIL) 15 mg capsule Take 1 Cap by mouth nightly as needed for Sleep for up to 30 days.  Max Daily Amount: 15 mg. 30 Cap 3    sertraline (ZOLOFT) 50 mg tablet Take  by mouth daily.  topiramate (TOPAMAX) 50 mg tablet Take  by mouth two (2) times a day.  naloxone (NARCAN) 4 mg/actuation spry 4 mg by Nasal route as needed. Spray contents of 1 nasal spray in nostril as one single dose. May repeat in 2-3 minutes in alternating nostril until EMS arrives. Indications: OPIATE-INDUCED RESPIRATORY DEPRESSION 1 Box 1    losartan-hydrochlorothiazide (HYZAAR) 100-25 mg per tablet Take 1 Tab by mouth daily.  NIFEdipine ER (ADALAT CC) 30 mg ER tablet Take  by mouth daily.  ranolazine ER (RANEXA) 500 mg SR tablet Take 1,000 mg by mouth two (2) times a day.  ergocalciferol (VITAMIN D2) 50,000 unit capsule Take 50,000 Units by mouth every seven (7) days.  gabapentin (NEURONTIN) 300 mg capsule Take 300 mg by mouth three (3) times daily.          No Known Allergies    Social History   Substance Use Topics    Smoking status: Former Smoker    Smokeless tobacco: Never Used    Alcohol use No       Family History   Problem Relation Age of Onset    Heart Attack Father     Hypertension Mother        Family Status   Relation Status    Father [de-identified]    Mother Alive       Review of Systems:  Positive in BOLD    CONST: Fever, weight loss, fatigue or chills  GI: Nausea, vomiting, abdominal pain, change in bowel habits, hematochezia, melena, and GERD   INTEG: Dermatitis, abnormal moles  HEENT: Recent changes in vision, vertigo, epistaxis, dysphagia and hoarseness  CV: Chest pain, palpitations, HTN, edema and varicosities  RESP: Cough, shortness of breath, wheezing, hemoptysis, snoring and reactive airway disease  : Hematuria, dysuria, frequency, urgency, nocturia and stress urinary incontinence   MS: Weakness, joint pain and arthritis  ENDO: Diabetes, thyroid disease, polyuria, polydipsia, polyphagia, poor wound healing, heat intolerance, cold intolerance  LYMPH/HEME: Anemia, bruising and history of blood transfusions  NEURO: Dizziness, headache, fainting, seizures and stroke, neuropathy  PSYCH: Anxiety and depression      Physical Exam    Visit Vitals    /86 (BP 1 Location: Left arm, BP Patient Position: At rest)    Pulse (!) 102    Temp 98 °F (36.7 °C) (Oral)    Resp 20    Ht 5' 9\" (1.753 m)    Wt 115.7 kg (255 lb)    LMP 07/23/2016    BMI 37.66 kg/m2             General: 37 y.o.) female in no acute distress. Morbidly obese in breasts and abdomen - mixed pattern  HEENT: Normocephalic, atraumatic, Pupils equal and reactive, nasopharynx clear, oropharynx clear and moist without lesions  NECK: Supple, no lymphadenopathy, thyromegaly, carotid bruits or jugular venous distension. trachea midline  RESP: Clear to auscultation bilaterally, no wheezes, rhonchi, or rales, normal respiratory excursion  CV: Regular rate and rhythm, no murmurs, rubs or gallops. 3+/4 pulses in bilateral dorsalis pedis and posterior tibialis. No distal edema or varicosities. ABD: Soft, nontender, nondistended, normoactive bowel sounds, no hernias, no hepatosplenomegaly, easily palpable costal margins, mixed distribution  Extremities: Warm, well perfused, no tenderness or swelling, normal gait/station  Neuro: Sensation and strength grossly intact and symmetrical  Psych: Alert and oriented to person, place, and time. Impression:    Lori Pino is a 37 y.o. female who is suffering from morbid obesity with a BMI of 38  and comorbidities including hypertension, obstructive sleep apnea - CPAP and weight related arthopathies  who would benefit from bariatric surgery. We have had an extensive discussion with regard to the risks, benefits and likely outcomes of the operation. We've discussed the restrictive and malabsorptive nature of the gastric bypass and compared and contrasted with the sleeve gastrectomy. The patient understands the likelihood of losing approximately 80% of their excess weight in 12 to 18 months.  She also understands the risks including but not limited to bleeding, infection, need for reoperation, ulcers, leaks and strictures, bowel obstruction secondary to adhesions and internal hernias, DVT, PE, heart attack, stroke, and death. Patient also understands risks of inadequate weight loss, excess weight loss, vitamin insufficiency, protein malnutrition, excess skin, and loss of hair. We have reviewed the components of a successful postoperative course including requirement for a high protein, low carbohydrate diet, 60 oz a day of zero calorie liquids, daily vitamin supplementation, daily exercise, regular follow-up, and participation in support groups. At this time we will enroll the patient in our bariatric program, undertake routine laboratory evaluation, chest X-ray, EKG, possible UGI and evaluation by  nutritionist as well as psychologist and pending their satisfactory completion of the preop evaluation, plan to perform a gastric bypass.

## 2017-10-12 NOTE — LETTER
10/12/2017 4:51 PM 
 
Patient:  Jan Pettit YOB: 1975 Date of Visit: 10/12/2017 Boby Albarran MD 
[address] [place] [address] [address] [address] VIA Facsimile: [phone] Dear Boby Albarran MD, Thank you for referring Ms. Jan Pettit to [address] [address] for evaluation and treatment. Below are the relevant portions of my assessment and plan of care. Initial Consultation for Bariatric Surgery Template (Gastric Bypass) Jan Pettit is a 37 y.o. female who comes into the office today for initial consultation for the surgical options for the treatment of morbid obesity. The patient initially identified obesity at the age of 26 and at age 26 weighed 180 lbs. She has never tried any weight loss trial.  
 
Today, the patient is  Height: 5' 9\" (175.3 cm) tall, Weight: 115.7 kg (255 lb) lbs for a Body mass index is 37.66 kg/(m^2). It is due to the patient's severe obesity, which is further complicated by hypertension, MYKEL, obstructive sleep apnea - CPAP and weight related arthopathies  that the patient is now seeking out bariatric surgery because she was told to lose weight before she can have knee surgery. She describes eating fried foods and sugars. She only drinks soft drinks. Past Medical History:  
Diagnosis Date  Arthritis  Back pain  Difficulty urinating  Dizziness DUE TO HEADACHES  
 Hypertension  Joint pain  Migraine  Morbid obesity ([address]  Pre-diabetes  Seizure ([facility] [address]  Sleep apnea CPAP  
 Stroke [facility]) 2010 Past Surgical History:  
Procedure Laterality Date  HX ACL RECONSTRUCTION    
 HX HERNIA REPAIR  50/2342  
 umbilical   
 HX HYSTERECTOMY  08/2016 TLH with BSO  HX KNEE ARTHROSCOPY Current Outpatient Prescriptions Medication Sig Dispense Refill  oxyMORphone (OPANA ER) 20 mg PO ER tablet Take 1 Tab by mouth every eight (8) hours for 30 days. Max Daily Amount: 60 mg. 90 Tab 0  
 tapentadol (NUCYNTA) 75 mg tablet Take 75 mg by mouth three (3) times daily as needed for Pain for up to 30 days. Max Daily Amount: 225 mg. 90 Tab 0  
 [START ON 10/17/2017] amitriptyline (ELAVIL) 25 mg tablet TAKE 1 TABLET BY MOUTH EVERY NIGHT 30 Tab 2  
 [START ON 12/23/2017] temazepam (RESTORIL) 15 mg capsule Take 1 Cap by mouth nightly as needed for Sleep for up to 30 days. Max Daily Amount: 15 mg. 30 Cap 3  
 sertraline (ZOLOFT) 50 mg tablet Take  by mouth daily.  topiramate (TOPAMAX) 50 mg tablet Take  by mouth two (2) times a day.  naloxone (NARCAN) 4 mg/actuation spry 4 mg by Nasal route as needed. Spray contents of 1 nasal spray in nostril as one single dose. May repeat in 2-3 minutes in alternating nostril until EMS arrives. Indications: OPIATE-INDUCED RESPIRATORY DEPRESSION 1 Box 1  
 losartan-hydrochlorothiazide (HYZAAR) 100-25 mg per tablet Take 1 Tab by mouth daily.  NIFEdipine ER (ADALAT CC) 30 mg ER tablet Take  by mouth daily.  ranolazine ER (RANEXA) 500 mg SR tablet Take 1,000 mg by mouth two (2) times a day.  ergocalciferol (VITAMIN D2) 50,000 unit capsule Take 50,000 Units by mouth every seven (7) days.  gabapentin (NEURONTIN) 300 mg capsule Take 300 mg by mouth three (3) times daily. No Known Allergies Social History Substance Use Topics  Smoking status: Former Smoker  Smokeless tobacco: Never Used  Alcohol use No  
 
 
Family History Problem Relation Age of Onset  Heart Attack Father  Hypertension Mother Family Status Relation Status  Father Alive  Mother Alive Review of Systems:  Positive in BOLD 
 
CONST: Fever, weight loss, fatigue or chills GI: Nausea, vomiting, abdominal pain, change in bowel habits, hematochezia, melena, and GERD INTEG: Dermatitis, abnormal moles HEENT: Recent changes in vision, vertigo, epistaxis, dysphagia and hoarseness CV: Chest pain, palpitations, HTN, edema and varicosities RESP: Cough, shortness of breath, wheezing, hemoptysis, snoring and reactive airway disease : Hematuria, dysuria, frequency, urgency, nocturia and stress urinary incontinence MS: Weakness, joint pain and arthritis ENDO: Diabetes, thyroid disease, polyuria, polydipsia, polyphagia, poor wound healing, heat intolerance, cold intolerance LYMPH/HEME: Anemia, bruising and history of blood transfusions NEURO: Dizziness, headache, fainting, seizures and stroke, neuropathy PSYCH: Anxiety and depression Physical Exam 
 
Visit Vitals  /86 (BP 1 Location: Left arm, BP Patient Position: At rest)  Pulse (!) 102  Temp 98 °F (36.7 °C) (Oral)  Resp 20  
 Ht 5' 9\" (1.753 m)  Wt 115.7 kg (255 lb)  LMP 07/23/2016  BMI 37.66 kg/m2 General: 37 y.o.) female in no acute distress. Morbidly obese in breasts and abdomen - mixed pattern HEENT: Normocephalic, atraumatic, Pupils equal and reactive, nasopharynx clear, oropharynx clear and moist without lesions NECK: Supple, no lymphadenopathy, thyromegaly, carotid bruits or jugular venous distension. trachea midline RESP: Clear to auscultation bilaterally, no wheezes, rhonchi, or rales, normal respiratory excursion CV: Regular rate and rhythm, no murmurs, rubs or gallops. 3+/4 pulses in bilateral dorsalis pedis and posterior tibialis. No distal edema or varicosities. ABD: Soft, nontender, nondistended, normoactive bowel sounds, no hernias, no hepatosplenomegaly, easily palpable costal margins, mixed distribution Extremities: Warm, well perfused, no tenderness or swelling, normal gait/station Neuro: Sensation and strength grossly intact and symmetrical 
Psych: Alert and oriented to person, place, and time.  
 
Impression: 
 
Nonda Friend is a 37 y.o. female who is suffering from morbid obesity with a BMI of 38  and comorbidities including hypertension, obstructive sleep apnea - CPAP and weight related arthopathies  who would benefit from bariatric surgery. We have had an extensive discussion with regard to the risks, benefits and likely outcomes of the operation. We've discussed the restrictive and malabsorptive nature of the gastric bypass and compared and contrasted with the sleeve gastrectomy. The patient understands the likelihood of losing approximately 80% of their excess weight in 12 to 18 months. She also understands the risks including but not limited to bleeding, infection, need for reoperation, ulcers, leaks and strictures, bowel obstruction secondary to adhesions and internal hernias, DVT, PE, heart attack, stroke, and death. Patient also understands risks of inadequate weight loss, excess weight loss, vitamin insufficiency, protein malnutrition, excess skin, and loss of hair. We have reviewed the components of a successful postoperative course including requirement for a high protein, low carbohydrate diet, 60 oz a day of zero calorie liquids, daily vitamin supplementation, daily exercise, regular follow-up, and participation in support groups. At this time we will enroll the patient in our bariatric program, undertake routine laboratory evaluation, chest X-ray, EKG, possible UGI and evaluation by  nutritionist as well as psychologist and pending their satisfactory completion of the preop evaluation, plan to perform a gastric bypass. Thank you very much for your referral of Ms. Jermaine Glynn. If you have questions, please do not hesitate to call me. I look forward to following Ms. Hines along with you and will keep you updated as to her progress.   
 
 
 
 
Sincerely, 
 
 
Amanda Blank MD

## 2017-12-07 ENCOUNTER — DOCUMENTATION ONLY (OUTPATIENT)
Dept: PAIN MANAGEMENT | Age: 42
End: 2017-12-07

## 2017-12-07 NOTE — PROGRESS NOTES
Request for records from [employer] and fax request to [email or web address] to process on 12/07/2017.

## 2017-12-15 ENCOUNTER — DOCUMENTATION ONLY (OUTPATIENT)
Dept: PAIN MANAGEMENT | Age: 42
End: 2017-12-15

## 2018-01-08 ENCOUNTER — OFFICE VISIT (OUTPATIENT)
Dept: PAIN MANAGEMENT | Age: 43
End: 2018-01-08

## 2018-01-08 VITALS
HEART RATE: 98 BPM | SYSTOLIC BLOOD PRESSURE: 138 MMHG | DIASTOLIC BLOOD PRESSURE: 94 MMHG | WEIGHT: 278 LBS | BODY MASS INDEX: 41.05 KG/M2 | RESPIRATION RATE: 16 BRPM | TEMPERATURE: 98.2 F

## 2018-01-08 DIAGNOSIS — G89.4 CHRONIC PAIN SYNDROME: ICD-10-CM

## 2018-01-08 DIAGNOSIS — G62.9 NEUROPATHY: ICD-10-CM

## 2018-01-08 DIAGNOSIS — G89.29 CHRONIC PAIN OF BOTH KNEES: ICD-10-CM

## 2018-01-08 DIAGNOSIS — M25.562 CHRONIC PAIN OF BOTH KNEES: ICD-10-CM

## 2018-01-08 DIAGNOSIS — M25.561 CHRONIC PAIN OF BOTH KNEES: ICD-10-CM

## 2018-01-08 DIAGNOSIS — M22.42 CHONDROMALACIA OF LEFT PATELLA: ICD-10-CM

## 2018-01-08 RX ORDER — AMITRIPTYLINE HYDROCHLORIDE 25 MG/1
TABLET, FILM COATED ORAL
Qty: 30 TAB | Refills: 1 | Status: SHIPPED | OUTPATIENT
Start: 2018-01-08 | End: 2019-01-04

## 2018-01-08 NOTE — PROGRESS NOTES
HISTORY OF PRESENT ILLNESS  Brenda Valerio is a 40 y.o. female    HPI: Ms. Catalino Araya  returns today for f/u of chronic left knee pain. 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 The pain started after a fall, March 13, 2010. She underwent one surgery, ACL reconstruction, around July, 2010. She continues unchanged since last visit. She has followed up with Dr. Osmany Kam where she received bilateral knee aspiration and cortisone injections with some improvement. She continues on her diet exercise program trying to lose some weight in preparation for [facility] surgery. We discussed her medications last visit, Elavil and Restoril. I have recommended that we either discontinue Elavil or switch to another agent. I have asked her to discuss this medication further with her PCP. She says that she has discussed the medication but was mistaken and thought she was supposed to discuss her Restoril with her PCP. She currently has plenty of refills with the Restoril and I have again asked her to discuss Elavil, not her Restoril, with her PCP. She verbally agrees. I will have her follow-up in 3 months or sooner if needed. Current medication management consists of Opana ER 20 mg every 8 hours, Nucynta IR 75 mg 3 times a day as needed, Elavil 25 mg at night, and Restoril 30 mg at night. Medications are helping with pain control and quality of life. Her pain is 6-7/10 with medication and 10/10 without. Pt describes pain as aching. Aggravating factors include standing and walking. Relieved with rest, medication, and avoiding painful activities. Current treatment is helping to improve general activity, mood, walking, sleep, enjoyment of life    She  is otherwise doing well with no other complaints today.  She denies any adverse events including nausea, vomiting, dizziness, increased constipation, hallucinations, or seizures. Because the patient's current regimen places him/her at increased risk for possible overdose, a prescription for naloxone nasal spray has been provided. The patient understands that this medication is only to be used in the setting of a possible overdose and that inadvertent use of this medication could precipitate overt withdrawal.    Attended education class on 2016      PRIOR IMAGIN. Lumbar MRI 16: Multilevel degenerative disc disease and facet arthropathy. Moderate canal narrowing and moderate bilateral neural foramina at L3-L4. L4-L5 moderate bilateral neural foraminal narrowing with mild canal narrowing. No Known Allergies    Past Surgical History:   Procedure Laterality Date    HX ACL RECONSTRUCTION      HX HERNIA REPAIR      umbilical     HX HYSTERECTOMY  2016    TLH with BSO    HX KNEE ARTHROSCOPY           Review of Systems   Constitutional: Negative for chills and fever. HENT: Negative for congestion and sore throat. Eyes: Negative for blurred vision and double vision. Respiratory: Positive for shortness of breath. Negative for cough and wheezing. Cardiovascular: Positive for chest pain and leg swelling. Negative for palpitations. Gastrointestinal: Positive for heartburn. Negative for abdominal pain, constipation, nausea and vomiting. Genitourinary: Negative for dysuria and urgency. Musculoskeletal: Positive for back pain and joint pain. Skin: Negative for itching and rash. Neurological: Positive for weakness and headaches. Negative for dizziness, seizures and loss of consciousness. Endo/Heme/Allergies: Negative for environmental allergies. Does not bruise/bleed easily. Psychiatric/Behavioral: Positive for depression. Negative for suicidal ideas. The patient is nervous/anxious and has insomnia.         Physical Exam   Constitutional: She is oriented to person, place, and time and well-developed, well-nourished, and in no distress. No distress. obese   HENT:   Head: Normocephalic and atraumatic. Eyes: EOM are normal.   Neck: Normal range of motion. Pulmonary/Chest: Effort normal.   Musculoskeletal:        Left knee: She exhibits decreased range of motion and swelling. Tenderness found. Neurological: She is alert and oriented to person, place, and time. Gait ( antalgic gait) abnormal.   Skin: Skin is warm and dry. No rash noted. She is not diaphoretic. No erythema. Psychiatric: Mood, memory, affect and judgment normal.   Nursing note and vitals reviewed. ASSESSMENT:    1. Chronic pain syndrome    2. Chondromalacia of left patella    3. Chronic pain of both knees    4. Neuropathy           [place] Prescription Monitoring Program was reviewed which does not demonstrate aberrancies and/or inconsistencies with regard to the historical prescribing of controlled medications to this patient by other providers. NOTE: Disclosed Percocet filled 7/28/16 for 5 days for postsurgical pain. Discussed percocet filled 1/5/17 for post procedural pain. PLAN / Pt Instructions:  1. Continue current plan with no evidence of addiction or diversion. Stable on current medication without adverse events. 2. Refill Opana ER 20 mg 3 times daily. 3. Refill Nucynta 75 mg up to 3 times daily as needed. 4. Continue Restoril 15 mg capsule once nightly as needed for sleep. With 3 refills  5. Refill Elavil 25 mg tablet once nightly as needed for sleep with 1 refills. Please discuss further refills of this medication with your PCP. Do not use along with Nucynta. 6. Refill Robaxin 500 mg tablet up to 3 times daily. 3 refills. 7. Naloxone 4 mg nasal spray for opioid induced respiratory depression emergency only. 8. Continue to f/u Orthopedic specialist.   9. Please contact our office within 72 hours if you receive outside controlled substance for postsurgical pain.   10. Discussed risks of addiction, dependency, and opioid induced hyperalgesia. 11. Return to clinic in 3 months       Medications Ordered Today   Medications    oxyMORphone (OPANA ER) 20 mg PO ER tablet     Sig: Take 1 Tab by mouth every eight (8) hours for 30 days. Max Daily Amount: 60 mg. Dispense:  90 Tab     Refill:  0    oxyMORphone 20 mg PO ER tablet     Sig: Take 1 Tab by mouth every eight (8) hours for 30 days. Max Daily Amount: 60 mg. Dispense:  90 Tab     Refill:  0    oxyMORphone 20 mg PO ER tablet     Sig: Take 1 Tab by mouth every eight (8) hours for 30 days. Max Daily Amount: 60 mg. Dispense:  90 Tab     Refill:  0    tapentadol (NUCYNTA) 75 mg tablet     Sig: Take 75 mg by mouth three (3) times daily as needed for Pain for up to 30 days. Max Daily Amount: 225 mg. Dispense:  90 Tab     Refill:  0    tapentadol (NUCYNTA) 75 mg tablet     Sig: Take 75 mg by mouth three (3) times daily as needed for Pain for up to 30 days. Max Daily Amount: 225 mg. Dispense:  90 Tab     Refill:  0    tapentadol (NUCYNTA) 75 mg tablet     Sig: Take 75 mg by mouth three (3) times daily as needed for Pain for up to 30 days. Max Daily Amount: 225 mg. Dispense:  90 Tab     Refill:  0    amitriptyline (ELAVIL) 25 mg tablet     Sig: TAKE 1 TABLET BY MOUTH EVERY NIGHT     Dispense:  30 Tab     Refill:  1       Pain medications prescribed with the objective of pain relief and improved physical and psychosocial function in this patient. Spent 25 minutes with patient today reviewing the treatment plan, goals of treatment plan, and limitations of the treatment plan, to include the potential for side effects from medications and procedures. Regan [place], [place] 1/8/2018      Note: Please excuse any typographical errors. Voice recognition software was used for this note and may cause mistakes.

## 2018-01-08 NOTE — PROGRESS NOTES
Nursing Notes    Patient presents to the office today in follow-up. Patient rates her pain at 8/10 on the numerical pain scale. Reviewed medications with counts as follows:    Rx Date filled Qty Dispensed Pill Count Last Dose Short   Nucynta 75 mg 12/07/17 90 0 This am no   Oxymorphone ER 20 mg 12/07/17 90 1 This am  no       Comments: Patient is here today for a follow up appt today she states her pain level today is an 8    POC UDS was not performed in office today    Any new labs or imaging since last appointment? NO    Have you been to an emergency room (ER) or urgent care clinic since your last visit? NO            Have you been hospitalized since your last visit? NO     If yes, where, when, and reason for visit? Have you seen or consulted any other health care providers outside of the [address] [place] [address]  since your last visit? NO     If yes, where, when, and reason for visit? HM deferred to pcp.

## 2018-01-08 NOTE — MR AVS SNAPSHOT
Visit Information Date & Time Provider Department Dept. Phone Encounter #  
 1/8/2018  9:00 AM Pineda Adler, [address] [place] [address] for Pain Management [id number] [phone] Follow-up Instructions Return in about 3 months (around 4/8/2018). Upcoming Health Maintenance Date Due DTaP/Tdap/Td series (1 - Tdap) 11/23/1997 PAP AKA CERVICAL CYTOLOGY 11/23/1997 Influenza Age 5 to Adult 8/1/2017 Allergies as of 1/8/2018  Review Complete On: 1/8/2018 By: SHAGUFTA Law No Known Allergies Current Immunizations  Never Reviewed No immunizations on file. Not reviewed this visit You Were Diagnosed With   
  
 Codes Comments Chronic pain syndrome     ICD-10-CM: G89.4 ICD-9-CM: 338. 4 Chondromalacia of left patella     ICD-10-CM: M22.42 
ICD-9-CM: 717.7 Chronic pain of both knees     ICD-10-CM: M25.561, M25.562, G89.29 ICD-9-CM: 719.46, 338.29 Neuropathy     ICD-10-CM: G62.9 ICD-9-CM: 723. 9 Vitals BP Pulse Temp Resp Weight(growth percentile) LMP  
 (!) 138/94 98 98.2 °F (36.8 °C) 16 278 lb (126.1 kg) 07/23/2016 BMI OB Status Smoking Status 41.05 kg/m2 Hysterectomy Former Smoker BMI and BSA Data Body Mass Index Body Surface Area 41.05 kg/m 2 2.48 m 2 Preferred Pharmacy Pharmacy Name Phone [facility] [address] [place] [address] Your Updated Medication List  
  
   
This list is accurate as of: 1/8/18  9:37 AM.  Always use your most recent med list.  
  
  
  
  
 amitriptyline 25 mg tablet Commonly known as:  ELAVIL TAKE 1 TABLET BY MOUTH EVERY NIGHT  
  
 gabapentin 300 mg capsule Commonly known as:  NEURONTIN Take 300 mg by mouth three (3) times daily. losartan-hydroCHLOROthiazide 100-25 mg per tablet Commonly known as:  HYZAAR Take 1 Tab by mouth daily. naloxone 4 mg/actuation nasal spray Commonly known as:  NARCAN  
4 mg by Nasal route as needed. Spray contents of 1 nasal spray in nostril as one single dose. May repeat in 2-3 minutes in alternating nostril until EMS arrives. Indications: OPIATE-INDUCED RESPIRATORY DEPRESSION  
  
 NIFEdipine ER 30 mg ER tablet Commonly known as:  ADALAT CC Take  by mouth daily. * oxyMORphone 20 mg ER tablet Commonly known as:  OPANA ER Take 1 Tab by mouth every eight (8) hours for 30 days. Max Daily Amount: 60 mg.  
  
 * oxyMORphone 20 mg ER tablet Commonly known as:  OPANA ER Take 1 Tab by mouth every eight (8) hours for 30 days. Max Daily Amount: 60 mg. Start taking on:  2/7/2018 * oxyMORphone 20 mg ER tablet Commonly known as:  OPANA ER Take 1 Tab by mouth every eight (8) hours for 30 days. Max Daily Amount: 60 mg. Start taking on:  3/8/2018 RANEXA 500 mg SR tablet Generic drug:  ranolazine ER Take 1,000 mg by mouth two (2) times a day. * tapentadol 75 mg tablet Commonly known as:  Jagruti Cheadle Take 75 mg by mouth three (3) times daily as needed for Pain for up to 30 days. Max Daily Amount: 225 mg.  
  
 * tapentadol 75 mg tablet Commonly known as:  Jagruti Cheadle Take 75 mg by mouth three (3) times daily as needed for Pain for up to 30 days. Max Daily Amount: 225 mg. Start taking on:  2/7/2018 * tapentadol 75 mg tablet Commonly known as:  Jagruti Cheadle Take 75 mg by mouth three (3) times daily as needed for Pain for up to 30 days. Max Daily Amount: 225 mg. Start taking on:  3/8/2018  
  
 temazepam 15 mg capsule Commonly known as:  RESTORIL Take 1 Cap by mouth nightly as needed for Sleep for up to 30 days. Max Daily Amount: 15 mg.  
  
 TOPAMAX 50 mg tablet Generic drug:  topiramate Take  by mouth two (2) times a day. VITAMIN D2 50,000 unit capsule Generic drug:  ergocalciferol Take 50,000 Units by mouth every seven (7) days. ZOLOFT 50 mg tablet Generic drug:  sertraline Take  by mouth daily. * Notice: This list has 6 medication(s) that are the same as other medications prescribed for you. Read the directions carefully, and ask your doctor or other care provider to review them with you. Prescriptions Printed Refills  
 oxyMORphone (OPANA ER) 20 mg PO ER tablet 0 Sig: Take 1 Tab by mouth every eight (8) hours for 30 days. Max Daily Amount: 60 mg.  
 Class: Print Route: Oral  
 oxyMORphone 20 mg PO ER tablet 0 Starting on: 2/7/2018 Sig: Take 1 Tab by mouth every eight (8) hours for 30 days. Max Daily Amount: 60 mg.  
 Class: Print Route: Oral  
 oxyMORphone 20 mg PO ER tablet 0 Starting on: 3/8/2018 Sig: Take 1 Tab by mouth every eight (8) hours for 30 days. Max Daily Amount: 60 mg.  
 Class: Print Route: Oral  
 tapentadol (NUCYNTA) 75 mg tablet 0 Sig: Take 75 mg by mouth three (3) times daily as needed for Pain for up to 30 days. Max Daily Amount: 225 mg.  
 Class: Print Route: Oral  
 tapentadol (NUCYNTA) 75 mg tablet 0 Starting on: 2/7/2018 Sig: Take 75 mg by mouth three (3) times daily as needed for Pain for up to 30 days. Max Daily Amount: 225 mg.  
 Class: Print Route: Oral  
 tapentadol (NUCYNTA) 75 mg tablet 0 Starting on: 3/8/2018 Sig: Take 75 mg by mouth three (3) times daily as needed for Pain for up to 30 days. Max Daily Amount: 225 mg.  
 Class: Print Route: Oral  
  
Prescriptions Sent to Pharmacy Refills  
 amitriptyline (ELAVIL) 25 mg tablet 1 Sig: TAKE 1 TABLET BY MOUTH EVERY NIGHT Class: Normal  
 Pharmacy: [facility] [address] [place] [address], [address] [place] [address] Ph #: [phone] Follow-up Instructions Return in about 3 months (around 4/8/2018). Patient Instructions 1. Continue current plan with no evidence of addiction or diversion. Stable on current medication without adverse events. 2. Refill Opana ER 20 mg 3 times daily. 3. Refill Nucynta 75 mg up to 3 times daily as needed. 4. Continue Restoril 15 mg capsule once nightly as needed for sleep. With 3 refills 5. Refill Elavil 25 mg tablet once nightly as needed for sleep with 1 refills. Please discuss further refills of this medication with your PCP. Do not use along with Nucynta. 6. Refill Robaxin 500 mg tablet up to 3 times daily. 3 refills. 7. Naloxone 4 mg nasal spray for opioid induced respiratory depression emergency only. 8. Continue to f/u Orthopedic specialist.  
9. Please contact our office within 72 hours if you receive outside controlled substance for postsurgical pain. 10. Discussed risks of addiction, dependency, and opioid induced hyperalgesia. 11. Return to clinic in 3 months Fibromyalgia: Care Instructions Your Care Instructions Fibromyalgia is a painful condition that is not completely understood by medical experts. The cause of fibromyalgia is not known. It can make you feel tired and ache all over. It causes tender spots at specific points of the body that hurt only when you press on them. You may have trouble sleeping, as well as other symptoms. These problems can upset your work and home life. Symptoms tend to come and go, although they may never go away completely. Fibromyalgia does not harm your muscles, joints, or organs. Follow-up care is a key part of your treatment and safety. Be sure to make and go to all appointments, and call your doctor if you are having problems. It's also a good idea to know your test results and keep a list of the medicines you take. How can you care for yourself at home? · Exercise often. Walk, swim, or bike to help with pain and sleep problems and to make you feel better. · Try to get a good night's sleep. Go to bed and get up at the same time each day, whether you feel rested or not. Make sure you have a good mattress and pillow. · Reduce stress. Avoid things that cause you stress, if you can. If not, work at making them less stressful. Learn to use biofeedback, guided imagery, meditation, or other methods to relax. · Make healthy changes. Eat a balanced diet, quit smoking, and limit alcohol and caffeine. · Use a heating pad set on low or take warm baths or showers for pain. Using cold packs for up to 20 minutes at a time can also relieve pain. Put a thin cloth between the cold pack and your skin. A gentle massage might help too. · Be safe with medicines. Take your medicines exactly as prescribed. Call your doctor if you think you are having a problem with your medicine. Your doctor may talk to you about taking antidepressant medicines. These medicines may improve sleep, relieve pain, and in some cases treat depression. · Learn about fibromyalgia. This makes coping easier. Then, take an active role in your treatment. · Think about joining a support group with others who have fibromyalgia to learn more and get support. When should you call for help? Watch closely for changes in your health, and be sure to contact your doctor if: 
? · You feel sad, helpless, or hopeless; lose interest in things you used to enjoy; or have other symptoms of depression. ? · Your fibromyalgia symptoms get worse. Where can you learn more? Go to http://delgado-carlota.info/. Enter V003 in the search box to learn more about \"Fibromyalgia: Care Instructions. \" Current as of: October 14, 2016 Content Version: 11.4 © 2542-4123 [employer]. Care instructions adapted under license by [employer] (which disclaims liability or warranty for this information). If you have questions about a medical condition or this instruction, always ask your healthcare professional. [place] [address] any warranty or liability for your use of this information. Introducing [facility]! [facility] introduces KOTURA patient portal. Now you can access parts of your medical record, email your doctor's office, and request medication refills online. 1. In your internet browser, go to [email or web address] 2. Click on the First Time User? Click Here link in the Sign In box. You will see the New Member Sign Up page. 3. Enter your KOTURA Access Code exactly as it appears below. You will not need to use this code after youve completed the sign-up process. If you do not sign up before the expiration date, you must request a new code. · KOTURA Access Code: [email or web address] Expires: 4/8/2018  9:37 AM 
 
4. Enter the last four digits of your Social Security Number (xxxx) and Date of Birth (mm/dd/yyyy) as indicated and click Submit. You will be taken to the next sign-up page. 5. Create a KOTURA ID. This will be your KOTURA login ID and cannot be changed, so think of one that is secure and easy to remember. 6. Create a KOTURA password. You can change your password at any time. 7. Enter your Password Reset Question and Answer. This can be used at a later time if you forget your password. 8. Enter your e-mail address. You will receive e-mail notification when new information is available in [address] 9. Click Sign Up. You can now view and download portions of your medical record. 10. Click the Download Summary menu link to download a portable copy of your medical information. If you have questions, please visit the Frequently Asked Questions section of the KOTURA website. Remember, KOTURA is NOT to be used for urgent needs. For medical emergencies, dial 911. Now available from your iPhone and Android! Please provide this summary of care documentation to your next provider. Your primary care clinician is listed as James Dent. If you have any questions after today's visit, please call [phone].

## 2018-01-08 NOTE — PATIENT INSTRUCTIONS
1. Continue current plan with no evidence of addiction or diversion. Stable on current medication without adverse events. 2. Refill Opana ER 20 mg 3 times daily. 3. Refill Nucynta 75 mg up to 3 times daily as needed. 4. Continue Restoril 15 mg capsule once nightly as needed for sleep. With 3 refills  5. Refill Elavil 25 mg tablet once nightly as needed for sleep with 1 refills. Please discuss further refills of this medication with your PCP. Do not use along with Nucynta. 6. Refill Robaxin 500 mg tablet up to 3 times daily. 3 refills. 7. Naloxone 4 mg nasal spray for opioid induced respiratory depression emergency only. 8. Continue to f/u Orthopedic specialist.   9. Please contact our office within 72 hours if you receive outside controlled substance for postsurgical pain. 10. Discussed risks of addiction, dependency, and opioid induced hyperalgesia. 11. Return to clinic in 3 months             Fibromyalgia: Care Instructions  Your Care Instructions    Fibromyalgia is a painful condition that is not completely understood by medical experts. The cause of fibromyalgia is not known. It can make you feel tired and ache all over. It causes tender spots at specific points of the body that hurt only when you press on them. You may have trouble sleeping, as well as other symptoms. These problems can upset your work and home life. Symptoms tend to come and go, although they may never go away completely. Fibromyalgia does not harm your muscles, joints, or organs. Follow-up care is a key part of your treatment and safety. Be sure to make and go to all appointments, and call your doctor if you are having problems. It's also a good idea to know your test results and keep a list of the medicines you take. How can you care for yourself at home? · Exercise often. Walk, swim, or bike to help with pain and sleep problems and to make you feel better. · Try to get a good night's sleep.  Go to bed and get up at the same time each day, whether you feel rested or not. Make sure you have a good mattress and pillow. · Reduce stress. Avoid things that cause you stress, if you can. If not, work at making them less stressful. Learn to use biofeedback, guided imagery, meditation, or other methods to relax. · Make healthy changes. Eat a balanced diet, quit smoking, and limit alcohol and caffeine. · Use a heating pad set on low or take warm baths or showers for pain. Using cold packs for up to 20 minutes at a time can also relieve pain. Put a thin cloth between the cold pack and your skin. A gentle massage might help too. · Be safe with medicines. Take your medicines exactly as prescribed. Call your doctor if you think you are having a problem with your medicine. Your doctor may talk to you about taking antidepressant medicines. These medicines may improve sleep, relieve pain, and in some cases treat depression. · Learn about fibromyalgia. This makes coping easier. Then, take an active role in your treatment. · Think about joining a support group with others who have fibromyalgia to learn more and get support. When should you call for help? Watch closely for changes in your health, and be sure to contact your doctor if:  ? · You feel sad, helpless, or hopeless; lose interest in things you used to enjoy; or have other symptoms of depression. ? · Your fibromyalgia symptoms get worse. Where can you learn more? Go to http://delgado-carlota.info/. Enter V003 in the search box to learn more about \"Fibromyalgia: Care Instructions. \"  Current as of: October 14, 2016  Content Version: 11.4  © 1804-2624 [employer] ?. Care instructions adapted under license by [employer] (which disclaims liability or warranty for this information).  If you have questions about a medical condition or this instruction, always ask your healthcare professional. Tori Loredo disclaims any warranty or liability for your use of this information.

## 2018-02-02 ENCOUNTER — TELEPHONE (OUTPATIENT)
Dept: PAIN MANAGEMENT | Age: 43
End: 2018-02-02

## 2018-02-02 NOTE — TELEPHONE ENCOUNTER
The pt called the office to report that she had been to see a hand surgeon. She states that she was cut really bad. The doctor gave her a script for norco. A chart review was done and she is currently on opana ER 20 mg TID and nucynta 75 mg TID prn. I called and spoke to the pt. She was given a script for norco 5/325 mg to take 1 tab every 6 hrs prn #20 tabs. This information was given to the provider for review. He states that the pt can take her current meds as prescribed and only take the norco as needed for the extra pain. This information was given to the pt and she verbalized understanding. No questions from the pt at this time.

## 2018-02-06 ENCOUNTER — TELEPHONE (OUTPATIENT)
Dept: PAIN MANAGEMENT | Age: 43
End: 2018-02-06

## 2018-02-06 NOTE — TELEPHONE ENCOUNTER
Patient called and stated that she needs a new script I will call the patient and speak to her . If I have to I will call the pharmacy. Speaking with the pharmacy staff they stated that she needs a new script for the meds.  I will speak to the provider

## 2018-02-06 NOTE — TELEPHONE ENCOUNTER
Patient called the triage line and stated that she has a  to attend tomorrow. She asked if she could fill early. Chart review was done and there have been no recent aberrancies. RC stated that she could fill one day early. I will call the patient and inform her of what the provider said. The patient was made aware of what the provider said . She states she will call the pharmacy and inform them of what was said by the provider .

## 2018-02-07 ENCOUNTER — TELEPHONE (OUTPATIENT)
Dept: PAIN MANAGEMENT | Age: 43
End: 2018-02-07

## 2018-02-07 NOTE — TELEPHONE ENCOUNTER
Patient called the office today to state that she ran out of pain medication and that a prior authorization is needed per her pharmacy. I then called the patient back and informed her that we did receive a notice for a PA this morning, but there were other people ahead of her and that we would get to it as soon as possible and would notify her when we heard anything. I also stated that if the pain becomes too severe to seek medical attention at the ER. Patient verbalized understanding.

## 2018-02-08 ENCOUNTER — TELEPHONE (OUTPATIENT)
Dept: PAIN MANAGEMENT | Age: 43
End: 2018-02-08

## 2018-02-16 ENCOUNTER — TELEPHONE (OUTPATIENT)
Dept: PAIN MANAGEMENT | Age: 43
End: 2018-02-16

## 2018-02-16 NOTE — TELEPHONE ENCOUNTER
Received call from patient stating that she received tussionex and robitussin with codeine; please advise.

## 2018-03-06 ENCOUNTER — TELEPHONE (OUTPATIENT)
Dept: PAIN MANAGEMENT | Age: 43
End: 2018-03-06

## 2018-03-06 NOTE — TELEPHONE ENCOUNTER
Called [facility] re pa for oxymorphone er - had received an approval on nucynta, but had not heard on oxymorphone er. Spoke with Damian Davis - said both nucynta and oxymorphone er were approved as of 2/8/18. Returned pt's call re pa for nucynta and oxymorphone er. Spoke with pt's mother and explained both medications have a pa on file for 6 months starting 2/8/18.

## 2018-03-28 RX ORDER — METHOCARBAMOL 500 MG/1
500 TABLET, FILM COATED ORAL 3 TIMES DAILY
Qty: 90 TAB | Refills: 0 | Status: SHIPPED | OUTPATIENT
Start: 2018-03-28 | End: 2018-08-09 | Stop reason: SDUPTHER

## 2018-04-09 ENCOUNTER — OFFICE VISIT (OUTPATIENT)
Dept: PAIN MANAGEMENT | Age: 43
End: 2018-04-09

## 2018-04-09 VITALS
SYSTOLIC BLOOD PRESSURE: 136 MMHG | RESPIRATION RATE: 16 BRPM | BODY MASS INDEX: 41.18 KG/M2 | HEART RATE: 92 BPM | TEMPERATURE: 97.2 F | WEIGHT: 278 LBS | DIASTOLIC BLOOD PRESSURE: 96 MMHG | HEIGHT: 69 IN

## 2018-04-09 DIAGNOSIS — G89.29 CHRONIC PAIN OF BOTH KNEES: ICD-10-CM

## 2018-04-09 DIAGNOSIS — M25.562 CHRONIC PAIN OF BOTH KNEES: ICD-10-CM

## 2018-04-09 DIAGNOSIS — Z79.899 ENCOUNTER FOR LONG-TERM (CURRENT) USE OF HIGH-RISK MEDICATION: Primary | ICD-10-CM

## 2018-04-09 DIAGNOSIS — G47.9 SLEEP DISTURBANCE: ICD-10-CM

## 2018-04-09 DIAGNOSIS — G89.4 CHRONIC PAIN SYNDROME: ICD-10-CM

## 2018-04-09 DIAGNOSIS — M25.561 CHRONIC PAIN OF BOTH KNEES: ICD-10-CM

## 2018-04-09 LAB
ALCOHOL UR POC: NORMAL
AMPHETAMINES UR POC: NEGATIVE
BARBITURATES UR POC: NORMAL
BENZODIAZEPINES UR POC: NEGATIVE
BUPRENORPHINE UR POC: NEGATIVE
CANNABINOIDS UR POC: NEGATIVE
CARISOPRODOL UR POC: NORMAL
COCAINE UR POC: NEGATIVE
FENTANYL UR POC: NORMAL
MDMA/ECSTASY UR POC: NORMAL
METHADONE UR POC: NEGATIVE
METHAMPHETAMINE UR POC: NORMAL
METHYLPHENIDATE UR POC: NORMAL
OPIATES UR POC: NEGATIVE
OXYCODONE UR POC: NEGATIVE
PHENCYCLIDINE UR POC: NORMAL
PROPOXYPHENE UR POC: NORMAL
TRAMADOL UR POC: NORMAL
TRICYCLICS UR POC: NORMAL

## 2018-04-09 RX ORDER — TEMAZEPAM 15 MG/1
15 CAPSULE ORAL
Qty: 30 CAP | Refills: 1 | Status: SHIPPED | OUTPATIENT
Start: 2018-04-20 | End: 2018-07-09 | Stop reason: SDUPTHER

## 2018-04-09 RX ORDER — METHOCARBAMOL 500 MG/1
500 TABLET, FILM COATED ORAL 3 TIMES DAILY
Qty: 90 TAB | Refills: 3 | Status: SHIPPED | OUTPATIENT
Start: 2018-04-09 | End: 2018-05-09

## 2018-04-09 NOTE — MR AVS SNAPSHOT
[address] [facility] [address] 
[phone] Patient: Katlyn Campbell MRN: [id number] :1975 Visit Information Date & Time Provider Department Dept. Phone Encounter #  
 2018  9:00 AM [facility] [id number] [phone] Follow-up Instructions Return in about 3 months (around 2018). Upcoming Health Maintenance Date Due DTaP/Tdap/Td series (1 - Tdap) 1997 PAP AKA CERVICAL CYTOLOGY 1997 Influenza Age 5 to Adult 2017 Allergies as of 2018  Review Complete On: 2018 By: SHAGUFTA [facility] No Known Allergies Current Immunizations  Never Reviewed No immunizations on file. Not reviewed this visit You Were Diagnosed With   
  
 Codes Comments Encounter for long-term (current) use of high-risk medication    -  Primary ICD-10-CM: R03.932 ICD-9-CM: V58.69 Chronic pain syndrome     ICD-10-CM: G89.4 ICD-9-CM: 338. 4 Chronic pain of both knees     ICD-10-CM: M25.561, M25.562, G89.29 ICD-9-CM: 719.46, 338.29 Sleep disturbance     ICD-10-CM: G47.9 ICD-9-CM: 780.50 Vitals BP Pulse Temp Resp Height(growth percentile) Weight(growth percentile) (!) 136/96 (BP 1 Location: Right arm, BP Patient Position: Sitting) 92 97.2 °F (36.2 °C) 16 5' 9\" (1.753 m) 278 lb (126.1 kg) LMP BMI OB Status Smoking Status 2016 41.05 kg/m2 Hysterectomy Former Smoker BMI and BSA Data Body Mass Index Body Surface Area 41.05 kg/m 2 2.48 m 2 Your Updated Medication List  
  
   
This list is accurate as of 18  9:52 AM.  Always use your most recent med list.  
  
  
  
  
 amitriptyline 25 mg tablet Commonly known as:  ELAVIL TAKE 1 TABLET BY MOUTH EVERY NIGHT  
  
 gabapentin 300 mg capsule Commonly known as:  NEURONTIN  
 Take 300 mg by mouth three (3) times daily. losartan-hydroCHLOROthiazide 100-25 mg per tablet Commonly known as:  HYZAAR Take 1 Tab by mouth daily. * methocarbamol 500 mg tablet Commonly known as:  ROBAXIN Take 1 Tab by mouth three (3) times daily. * methocarbamol 500 mg tablet Commonly known as:  ROBAXIN Take 1 Tab by mouth three (3) times daily for 30 days. naloxone 4 mg/actuation nasal spray Commonly known as:  NARCAN  
4 mg by Nasal route as needed. Spray contents of 1 nasal spray in nostril as one single dose. May repeat in 2-3 minutes in alternating nostril until EMS arrives. Indications: OPIATE-INDUCED RESPIRATORY DEPRESSION  
  
 NIFEdipine ER 30 mg ER tablet Commonly known as:  ADALAT CC Take  by mouth daily. * oxyMORphone 20 mg ER tablet Commonly known as:  OPANA ER Take 1 Tab by mouth every eight (8) hours for 30 days. Max Daily Amount: 60 mg.  
  
 * oxyMORphone 20 mg ER tablet Commonly known as:  OPANA ER Take 1 Tab by mouth every eight (8) hours for 30 days. Max Daily Amount: 60 mg. Start taking on:  5/8/2018 * oxyMORphone 20 mg ER tablet Commonly known as:  OPANA ER Take 1 Tab by mouth every eight (8) hours for 30 days. Max Daily Amount: 60 mg. Start taking on:  6/7/2018 RANEXA 500 mg SR tablet Generic drug:  ranolazine ER Take 1,000 mg by mouth two (2) times a day. * tapentadol 75 mg tablet Commonly known as:  Lety Serum Take 75 mg by mouth three (3) times daily as needed for Pain for up to 30 days. Max Daily Amount: 225 mg.  
  
 * tapentadol 75 mg tablet Commonly known as:  Lety Serum Take 75 mg by mouth three (3) times daily as needed for Pain for up to 30 days. Max Daily Amount: 225 mg. Start taking on:  5/8/2018 * tapentadol 75 mg tablet Commonly known as:  Lety Serum Take 75 mg by mouth three (3) times daily as needed for Pain for up to 30 days. Max Daily Amount: 225 mg. Start taking on:  6/7/2018  
  
 temazepam 15 mg capsule Commonly known as:  RESTORIL Take 1 Cap by mouth nightly as needed for Sleep for up to 30 days. Max Daily Amount: 15 mg. Start taking on:  4/20/2018 TOPAMAX 50 mg tablet Generic drug:  topiramate Take  by mouth two (2) times a day. VITAMIN D2 50,000 unit capsule Generic drug:  ergocalciferol Take 50,000 Units by mouth every seven (7) days. ZOLOFT 50 mg tablet Generic drug:  sertraline Take  by mouth daily. * Notice: This list has 8 medication(s) that are the same as other medications prescribed for you. Read the directions carefully, and ask your doctor or other care provider to review them with you. Prescriptions Printed Refills  
 oxyMORphone 20 mg PO ER tablet 0 Sig: Take 1 Tab by mouth every eight (8) hours for 30 days. Max Daily Amount: 60 mg.  
 Class: Print Route: Oral  
 oxyMORphone 20 mg PO ER tablet 0 Starting on: 5/8/2018 Sig: Take 1 Tab by mouth every eight (8) hours for 30 days. Max Daily Amount: 60 mg.  
 Class: Print Route: Oral  
 oxyMORphone (OPANA ER) 20 mg PO ER tablet 0 Starting on: 6/7/2018 Sig: Take 1 Tab by mouth every eight (8) hours for 30 days. Max Daily Amount: 60 mg.  
 Class: Print Route: Oral  
 tapentadol (NUCYNTA) 75 mg tablet 0 Sig: Take 75 mg by mouth three (3) times daily as needed for Pain for up to 30 days. Max Daily Amount: 225 mg.  
 Class: Print Route: Oral  
 tapentadol (NUCYNTA) 75 mg tablet 0 Starting on: 5/8/2018 Sig: Take 75 mg by mouth three (3) times daily as needed for Pain for up to 30 days. Max Daily Amount: 225 mg.  
 Class: Print Route: Oral  
 tapentadol (NUCYNTA) 75 mg tablet 0 Starting on: 6/7/2018 Sig: Take 75 mg by mouth three (3) times daily as needed for Pain for up to 30 days. Max Daily Amount: 225 mg.  
 Class: Print Route: Oral  
 temazepam (RESTORIL) 15 mg capsule 1 Starting on: 4/20/2018 Sig: Take 1 Cap by mouth nightly as needed for Sleep for up to 30 days. Max Daily Amount: 15 mg.  
 Class: Print Route: Oral  
 methocarbamol (ROBAXIN) 500 mg tablet 3 Sig: Take 1 Tab by mouth three (3) times daily for 30 days. Class: Print Route: Oral  
  
We Performed the Following AMB POC DRUG SCREEN () [ [facility]] DRUG SCREEN [[id number] Custom] Follow-up Instructions Return in about 3 months (around 7/9/2018). Patient Instructions 1. Continue current plan with no evidence of addiction or diversion. Stable on current medication without adverse events. 2. Refill Opana ER 20 mg 3 times daily. 3. Refill Nucynta 75 mg up to 3 times daily as needed. 4. Refill Restoril 15 mg capsule once nightly as needed for sleep. With 1 refill. Please discuss further sleep treatment/refills with your PCP and/or sleep specialist as we will no longer be able to prescribe within our office. 5. Refill Robaxin 500 mg tablet up to 3 times daily. 3 refills. 6. Naloxone 4 mg nasal spray for opioid induced respiratory depression emergency only. 7. Continue to f/u Orthopedic specialist.  
8. Please contact our office within 72 hours if you receive outside controlled substance for postsurgical pain. 9. Discussed risks of addiction, dependency, and opioid induced hyperalgesia. 10. Return to clinic in 3 months Introducing [facility]! Shalini Shabazz introduces [facility] patient portal. Now you can access parts of your medical record, email your doctor's office, and request medication refills online. 1. In your internet browser, go to [email or web address]. [email or web address] 2. Click on the First Time User? Click Here link in the Sign In box. You will see the New Member Sign Up page. 3. Enter your [facility] Access Code exactly as it appears below. You will not need to use this code after youve completed the sign-up process.  If you do not sign up before the expiration date, you must request a new code. · [facility] Access Code: [email or web address] Expires: 7/8/2018  9:52 AM 
 
4. Enter the last four digits of your Social Security Number (xxxx) and Date of Birth (mm/dd/yyyy) as indicated and click Submit. You will be taken to the next sign-up page. 5. Create a [employer] ID. This will be your [facility] login ID and cannot be changed, so think of one that is secure and easy to remember. 6. Create a [employer] password. You can change your password at any time. 7. Enter your Password Reset Question and Answer. This can be used at a later time if you forget your password. 8. Enter your e-mail address. You will receive e-mail notification when new information is available in [address] 9. Click Sign Up. You can now view and download portions of your medical record. 10. Click the Download Summary menu link to download a portable copy of your medical information. If you have questions, please visit the Frequently Asked Questions section of the [employer] website. Remember, [facility] is NOT to be used for urgent needs. For medical emergencies, dial 911. Now available from your iPhone and Android! Please provide this summary of care documentation to your next provider. Your primary care clinician is listed as Gwyn Degroot. If you have any questions after today's visit, please call [phone].

## 2018-04-09 NOTE — PATIENT INSTRUCTIONS
1. Continue current plan with no evidence of addiction or diversion. Stable on current medication without adverse events. 2. Refill Opana ER 20 mg 3 times daily. 3. Refill Nucynta 75 mg up to 3 times daily as needed. 4. Refill Restoril 15 mg capsule once nightly as needed for sleep. With 1 refill. Please discuss further sleep treatment/refills with your PCP and/or sleep specialist as we will no longer be able to prescribe within our office. 5. Refill Robaxin 500 mg tablet up to 3 times daily. 3 refills. 6. Naloxone 4 mg nasal spray for opioid induced respiratory depression emergency only. 7. Continue to f/u Orthopedic specialist.   8. Please contact our office within 72 hours if you receive outside controlled substance for postsurgical pain. 9. Discussed risks of addiction, dependency, and opioid induced hyperalgesia.    10. Return to clinic in 3 months

## 2018-04-09 NOTE — PROGRESS NOTES
HISTORY OF PRESENT ILLNESS  Renita Duenas is a 40 y.o. female    HPI: Ms. Marquita Leyva  returns today for f/u of chronic left knee pain. 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 The pain started after a fall, March 13, 2010. She underwent one surgery, ACL reconstruction, around July, 2010. Some improvement with recent cortisone injections    She continues unchanged since last visit. She continues to do well with her current treatment plan which has been offering moderate pain control. She does report that she has discussed her sleep medication with her PCP who told her to discuss refills with our practice. We had another lengthy conversation about this today. I have explained to her that moving forward we will not be able to continue with her Elavil or temazepam.  I have strongly encouraged her to discuss this further with her PCP and/or referral to sleep specialist for further evaluation and recommendation. She verbally agrees. We discussed tapering plan for temazepam in detail today in case this medication is not continued. I have given her 2 months worth of this medication to give her time for transition. She is otherwise doing well with no other complaints today. I will have her follow-up in 3 months for further evaluation and recommendation. Current medication management consists of Opana ER 20 mg every 8 hours, Nucynta IR 75 mg 3 times a day as needed, Elavil 25 mg at night, and Restoril 30 mg at night. Medications are helping with pain control and quality of life. Her pain is 6-7/10 with medication and 10/10 without. Pt describes pain as aching. Aggravating factors include standing and walking. Relieved with rest, medication, and avoiding painful activities.  Current treatment is helping to improve general activity, mood, walking, sleep, enjoyment of life    She  is otherwise doing well with no other complaints today. She denies any adverse events including nausea, vomiting, dizziness, increased constipation, hallucinations, or seizures. Because the patient's current regimen places him/her at increased risk for possible overdose, a prescription for naloxone nasal spray has been provided. The patient understands that this medication is only to be used in the setting of a possible overdose and that inadvertent use of this medication could precipitate overt withdrawal.    Attended education class on 2016    POC UDS today. Confirmation pending. PRIOR IMAGIN. Lumbar MRI 16: Multilevel degenerative disc disease and facet arthropathy. Moderate canal narrowing and moderate bilateral neural foramina at L3-L4. L4-L5 moderate bilateral neural foraminal narrowing with mild canal narrowing. No Known Allergies    Past Surgical History:   Procedure Laterality Date    HX ACL RECONSTRUCTION      HX HERNIA REPAIR      umbilical     HX HYSTERECTOMY  2016    TLH with BSO    HX KNEE ARTHROSCOPY           Review of Systems   Constitutional: Negative for chills and fever. HENT: Negative for congestion and sore throat. Eyes: Negative for blurred vision and double vision. Respiratory: Positive for shortness of breath. Negative for cough and wheezing. Cardiovascular: Positive for chest pain and leg swelling. Negative for palpitations. Gastrointestinal: Positive for heartburn. Negative for abdominal pain, constipation, nausea and vomiting. Genitourinary: Negative for dysuria and urgency. Musculoskeletal: Positive for back pain and joint pain. Skin: Negative for itching and rash. Neurological: Positive for weakness and headaches. Negative for dizziness, seizures and loss of consciousness. Endo/Heme/Allergies: Negative for environmental allergies. Does not bruise/bleed easily. Psychiatric/Behavioral: Positive for depression.  Negative for suicidal ideas. The patient is nervous/anxious and has insomnia. Physical Exam   Constitutional: She is oriented to person, place, and time and well-developed, well-nourished, and in no distress. No distress. obese   HENT:   Head: Normocephalic and atraumatic. Eyes: EOM are normal.   Neck: Normal range of motion. Pulmonary/Chest: Effort normal.   Musculoskeletal:        Left knee: She exhibits decreased range of motion and swelling. Tenderness found. Neurological: She is alert and oriented to person, place, and time. Gait ( antalgic gait) abnormal.   Skin: Skin is warm and dry. No rash noted. She is not diaphoretic. No erythema. Psychiatric: Mood, memory, affect and judgment normal.   Nursing note and vitals reviewed. ASSESSMENT:    1. Encounter for long-term (current) use of high-risk medication    2. Chronic pain syndrome    3. Chronic pain of both knees    4. Sleep disturbance           [place] Prescription Monitoring Program was reviewed which does not demonstrate aberrancies and/or inconsistencies with regard to the historical prescribing of controlled medications to this patient by other providers. NOTE: Disclosed Percocet filled 7/28/16 for 5 days for postsurgical pain. Discussed percocet filled 1/5/17 for post procedural pain. PLAN / Pt Instructions:  1. Continue current plan with no evidence of addiction or diversion. Stable on current medication without adverse events. 2. Refill Opana ER 20 mg 3 times daily. 3. Refill Nucynta 75 mg up to 3 times daily as needed. 4. Refill Restoril 15 mg capsule once nightly as needed for sleep. With 1 refill. Please discuss further sleep treatment/refills with your PCP and/or sleep specialist as we will no longer be able to prescribe within our office. 5. Refill Robaxin 500 mg tablet up to 3 times daily. 3 refills. 6. Naloxone 4 mg nasal spray for opioid induced respiratory depression emergency only.     7. Continue to f/u Orthopedic specialist.   8. Please contact our office within 72 hours if you receive outside controlled substance for postsurgical pain. 9. Discussed risks of addiction, dependency, and opioid induced hyperalgesia. 10. Return to clinic in 3 months       Medications Ordered Today   Medications    oxyMORphone 20 mg PO ER tablet     Sig: Take 1 Tab by mouth every eight (8) hours for 30 days. Max Daily Amount: 60 mg. Dispense:  90 Tab     Refill:  0    oxyMORphone 20 mg PO ER tablet     Sig: Take 1 Tab by mouth every eight (8) hours for 30 days. Max Daily Amount: 60 mg. Dispense:  90 Tab     Refill:  0    oxyMORphone (OPANA ER) 20 mg PO ER tablet     Sig: Take 1 Tab by mouth every eight (8) hours for 30 days. Max Daily Amount: 60 mg. Dispense:  90 Tab     Refill:  0    tapentadol (NUCYNTA) 75 mg tablet     Sig: Take 75 mg by mouth three (3) times daily as needed for Pain for up to 30 days. Max Daily Amount: 225 mg. Dispense:  90 Tab     Refill:  0    tapentadol (NUCYNTA) 75 mg tablet     Sig: Take 75 mg by mouth three (3) times daily as needed for Pain for up to 30 days. Max Daily Amount: 225 mg. Dispense:  90 Tab     Refill:  0    tapentadol (NUCYNTA) 75 mg tablet     Sig: Take 75 mg by mouth three (3) times daily as needed for Pain for up to 30 days. Max Daily Amount: 225 mg. Dispense:  90 Tab     Refill:  0    temazepam (RESTORIL) 15 mg capsule     Sig: Take 1 Cap by mouth nightly as needed for Sleep for up to 30 days. Max Daily Amount: 15 mg. Dispense:  30 Cap     Refill:  1    methocarbamol (ROBAXIN) 500 mg tablet     Sig: Take 1 Tab by mouth three (3) times daily for 30 days. Dispense:  90 Tab     Refill:  3       Pain medications prescribed with the objective of pain relief and improved physical and psychosocial function in this patient.     Spent 25 minutes with patient today reviewing the treatment plan, goals of treatment plan, and limitations of the treatment plan, to include the potential for side effects from medications and procedures. Jonathan Branham 4/9/2018      Note: Please excuse any typographical errors. Voice recognition software was used for this note and may cause mistakes.

## 2018-04-09 NOTE — PROGRESS NOTES
Nursing Notes    Patient presents to the office today in follow-up. Patient rates her pain at 8/10 on the numerical pain scale. Reviewed medications with counts as follows:    Rx Date filled Qty Dispensed Pill Count Last Dose Short   Temazepam 15 mg 03/21/18 30 10 Last pm no   Nucynta 75 mg 03/08/18 90 0 yesterday no   Oxymorphone Er 20 mg 03/08/18 90 0 yesterday no         Comments: Patient is here today for a follow up appt today she states her pain level is an 8  She states she has seen her pcm since her last appt here. She states she went to [facility] for her seizures and an xray     POC UDS was performed in office today per verbal order per [facility]    Any new labs or imaging since last appointment? YES xray at [address] [facility] you been to an emergency room (ER) or urgent care clinic since your last visit? YES       [facility]      Have you been hospitalized since your last visit? NO     If yes, where, when, and reason for visit? Have you seen or consulted any other health care providers outside of the [address] [place] [address]  since your last visit? YES [facility]      If yes, where, when, and reason for visit? HM deferred to pcp.

## 2018-04-18 ENCOUNTER — TELEPHONE (OUTPATIENT)
Dept: PAIN MANAGEMENT | Age: 43
End: 2018-04-18

## 2018-04-18 NOTE — TELEPHONE ENCOUNTER
Received call from patient stating that she has lost her medication that she just filled for this month; please advise.

## 2018-05-22 NOTE — TELEPHONE ENCOUNTER
I missed this message. Please see if patient was able to find medication or what her status is currently.   (Routing comment) /rc

## 2018-07-09 ENCOUNTER — TELEPHONE (OUTPATIENT)
Dept: PAIN MANAGEMENT | Age: 43
End: 2018-07-09

## 2018-07-09 ENCOUNTER — OFFICE VISIT (OUTPATIENT)
Dept: PAIN MANAGEMENT | Age: 43
End: 2018-07-09

## 2018-07-09 VITALS
RESPIRATION RATE: 16 BRPM | BODY MASS INDEX: 41.18 KG/M2 | SYSTOLIC BLOOD PRESSURE: 135 MMHG | HEIGHT: 69 IN | DIASTOLIC BLOOD PRESSURE: 102 MMHG | TEMPERATURE: 98.6 F | WEIGHT: 278 LBS | HEART RATE: 97 BPM

## 2018-07-09 DIAGNOSIS — M22.42 CHONDROMALACIA OF LEFT PATELLA: ICD-10-CM

## 2018-07-09 DIAGNOSIS — G89.29 CHRONIC PAIN OF BOTH KNEES: ICD-10-CM

## 2018-07-09 DIAGNOSIS — G47.9 SLEEP DISTURBANCE: ICD-10-CM

## 2018-07-09 DIAGNOSIS — G89.4 CHRONIC PAIN SYNDROME: ICD-10-CM

## 2018-07-09 DIAGNOSIS — M25.562 CHRONIC PAIN OF BOTH KNEES: ICD-10-CM

## 2018-07-09 DIAGNOSIS — M25.561 CHRONIC PAIN OF BOTH KNEES: ICD-10-CM

## 2018-07-09 RX ORDER — OXYMORPHONE HYDROCHLORIDE 20 MG/1
20 TABLET, FILM COATED, EXTENDED RELEASE ORAL EVERY 8 HOURS
Refills: 0 | COMMUNITY
Start: 2018-05-07 | End: 2018-07-09 | Stop reason: SDUPTHER

## 2018-07-09 RX ORDER — TEMAZEPAM 7.5 MG/1
7.5 CAPSULE ORAL
Qty: 15 CAP | Refills: 0 | Status: SHIPPED | OUTPATIENT
Start: 2018-07-09 | End: 2018-07-24

## 2018-07-09 RX ORDER — OXYMORPHONE HYDROCHLORIDE 10 MG/1
10 TABLET, FILM COATED, EXTENDED RELEASE ORAL EVERY 12 HOURS
Qty: 60 TAB | Refills: 0 | Status: SHIPPED | OUTPATIENT
Start: 2018-09-07 | End: 2018-10-07

## 2018-07-09 NOTE — PROGRESS NOTES
HISTORY OF PRESENT ILLNESS  Brandy Jensen is a 40 y.o. female    HPI: Ms. Danay Cummings  returns today for f/u of chronic left knee pain. 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 The pain started after a fall, March 13, 2010. She underwent one surgery, ACL reconstruction, around July, 2010. Some improvement with recent cortisone injections    She continues unchanged since last visit. She is here today with her mother. She has been doing well with her current treatment plan which continues to offer significant pain control. We did have a lengthy conversation about the risks associated with opioids and benzodiazepines. We will begin tapering her Restoril. She will discuss alternative treatments for sleep with her PCP as soon as possible as we will no longer be able to prescribe opioid medications while she is concurrently using benzodiazepines. We spent most of today's visit discussing changes to our practice. Explained her that moving forward we will be focusing on more conservative and non-opioid plan of care. This will result in changes to her current treatment plan. We will begin tapering her Opana. Please see tapering plan below. We will continue with her Nucynta. I have agreed to adjust her Nucynta up to 4 times daily next month as needed until next visit. She does understand we will begin tapering her Nucynta after she has completed tapering her Opana. She has expressed her interest in transferring her care. I have asked her to call and cancel her appointment and pain management agreement if she does decide to transfer care. Current medication management consists of Opana ER 20 mg every 8 hours, Nucynta IR 75 mg 3 times a day as needed, and Restoril 30 mg at night. Medications are helping with pain control and quality of life.  Her pain is 6-7/10 with medication and 10/10 without. Pt describes pain as aching. Aggravating factors include standing and walking. Relieved with rest, medication, and avoiding painful activities. Current treatment is helping to improve general activity, mood, walking, sleep, enjoyment of life    She  is otherwise doing well with no other complaints today. She denies any adverse events including nausea, vomiting, dizziness, increased constipation, hallucinations, or seizures. Because the patient's current regimen places him/her at increased risk for possible overdose, a prescription for naloxone nasal spray has been provided. The patient understands that this medication is only to be used in the setting of a possible overdose and that inadvertent use of this medication could precipitate overt withdrawal.      PRIOR IMAGIN. Lumbar MRI 16: Multilevel degenerative disc disease and facet arthropathy. Moderate canal narrowing and moderate bilateral neural foramina at L3-L4. L4-L5 moderate bilateral neural foraminal narrowing with mild canal narrowing. MME: 270  COMM: 26  OSWESTRY: 80 %  Last UDS reviewed  Attended education class on 2016         No Known Allergies    Past Surgical History:   Procedure Laterality Date    HX ACL RECONSTRUCTION      HX HERNIA REPAIR      umbilical     HX HYSTERECTOMY  2016    TLH with BSO    HX KNEE ARTHROSCOPY           Review of Systems   Constitutional: Negative for chills and fever. HENT: Negative for congestion and sore throat. Eyes: Negative for blurred vision and double vision. Respiratory: Positive for shortness of breath. Negative for cough and wheezing. Cardiovascular: Positive for chest pain and leg swelling. Negative for palpitations. Gastrointestinal: Positive for heartburn. Negative for abdominal pain, constipation, nausea and vomiting. Genitourinary: Negative for dysuria and urgency. Musculoskeletal: Positive for back pain and joint pain.    Skin: Negative for itching and rash. Neurological: Positive for weakness and headaches. Negative for dizziness, seizures and loss of consciousness. Endo/Heme/Allergies: Negative for environmental allergies. Does not bruise/bleed easily. Psychiatric/Behavioral: Positive for depression. Negative for suicidal ideas. The patient is nervous/anxious and has insomnia. Physical Exam   Constitutional: She is oriented to person, place, and time and well-developed, well-nourished, and in no distress. No distress. obese   HENT:   Head: Normocephalic and atraumatic. Eyes: EOM are normal.   Neck: Normal range of motion. Pulmonary/Chest: Effort normal.   Musculoskeletal:        Left knee: She exhibits decreased range of motion and swelling. Tenderness found. Neurological: She is alert and oriented to person, place, and time. Gait ( antalgic gait) abnormal.   Skin: Skin is warm and dry. No rash noted. She is not diaphoretic. No erythema. Psychiatric: Mood, memory, affect and judgment normal.   Nursing note and vitals reviewed. ASSESSMENT:    1. Chronic pain syndrome    2. Chondromalacia of left patella    3. Chronic pain of both knees    4. Sleep disturbance           [place] Prescription Monitoring Program was reviewed which does not demonstrate aberrancies and/or inconsistencies with regard to the historical prescribing of controlled medications to this patient by other providers. NOTE: Disclosed Percocet filled 7/28/16 for 5 days for postsurgical pain. Discussed percocet filled 1/5/17 for post procedural pain. PLAN / Pt Instructions:  1. Continue current plan with no evidence of addiction or diversion. Stable on current medication without adverse events. 2. Refill and adjust Opana ER 20 mg down to 15 mg every 8 hours ×1 month, then adjust down to 10 mg every 8 hours ×1 month, then adjust down to 10 mg every 12 hours until next visit.   3. Refill Nucynta 75 mg up to 3 times daily as needed ×1 month, then adjust up to 4 times daily as needed until next visit. 4. Begin tapering Restoril 50 mg down to 7.5 mg once nightly as needed ×15 days, then discontinue. Please discuss alternative treatments with your PCP for sleep as soon as possible. Please remember that we can no longer prescribe opioid medications while you are concurrently taking benzodiazepines. 5. Continue Robaxin 500 mg tablet up to 3 times daily. 3 refills. 6. Naloxone 4 mg nasal spray for opioid induced respiratory depression emergency only. 7. Continue to f/u Orthopedic specialist.   8. Please contact our office within 72 hours if you receive outside controlled substance for postsurgical pain. 9. Discussed risks of addiction, dependency, and opioid induced hyperalgesia. 10. Please remember to call at least 4-5 business days prior to your medication refill. 11. Return to clinic in 3 months. Please call and cancel your appointment and pain management agreement if you do decide to transfer your care. Medications Ordered Today   Medications    tapentadol (NUCYNTA) 75 mg tablet     Sig: Take 75 mg by mouth three (3) times daily as needed for Pain for up to 30 days. Max Daily Amount: 225 mg. Dispense:  90 Tab     Refill:  0    oxyMORphone 15 mg PO TR12     Sig: Take 15 mg by mouth every eight (8) hours for 30 days. Max Daily Amount: 45 mg. Dispense:  90 Tab     Refill:  0    tapentadol (NUCYNTA) 75 mg tablet     Sig: Take 75 mg by mouth four (4) times daily as needed for Pain for up to 30 days. Max Daily Amount: 300 mg. Dispense:  120 Tab     Refill:  0    tapentadol (NUCYNTA) 75 mg tablet     Sig: Take 75 mg by mouth four (4) times daily as needed for Pain for up to 30 days. Max Daily Amount: 300 mg. Dispense:  120 Tab     Refill:  0    oxyMORphone 10 mg PO ER tablet     Sig: Take 1 Tab by mouth every eight (8) hours for 30 days. Max Daily Amount: 30 mg.      Dispense:  90 Tab     Refill:  0    oxyMORphone (OPANA SR) 10 mg tablet     Sig: Take 1 Tab by mouth every twelve (12) hours for 30 days. Max Daily Amount: 20 mg. Dispense:  60 Tab     Refill:  0    temazepam (RESTORIL) 7.5 mg capsule     Sig: Take 1 Cap by mouth nightly as needed for Sleep for up to 15 days. Max Daily Amount: 7.5 mg.     Dispense:  15 Cap     Refill:  0       DISPOSITION   Pain medications are prescribed with the objective of pain relief and improved physical and psychosocial function in this patient.  Patient has been counseled on proper use of prescribed medications.  Patient has been counseled about chronic medical conditions and their relationship to anxiety and depression and recommended mental health support as needed.  Reviewed with patient self-help tools, home exercise, and lifestyle changes to assist the patient in self-management of symptoms.  Reviewed with patient the treatment plan, goals of treatment plan, and limitations of treatment plan, to include the potential for side effects from medications and procedures. If side effects occur, it is the responsibility of the patient to inform the clinic so that a change in the treatment plan can be made in a safe manner. The patient is advised that stopping prescribed medication may cause an increase in symptoms and possible medication withdrawal symptoms. The patient is informed an emergency room evaluation may be necessary if this occurs. Spent 25 minutes with patient today which more than 50% of that time was spent on counseling and coordination of care. Jonathan Del Castillo 7/9/2018      Note: Please excuse any typographical errors. Voice recognition software was used for this note and may cause mistakes.

## 2018-07-09 NOTE — PATIENT INSTRUCTIONS
1. Continue current plan with no evidence of addiction or diversion. Stable on current medication without adverse events. 2. Refill and adjust Opana ER 20 mg down to 15 mg every 8 hours ×1 month, then adjust down to 10 mg every 8 hours ×1 month, then adjust down to 10 mg every 12 hours until next visit. 3. Refill Nucynta 75 mg up to 3 times daily as needed ×1 month, then adjust up to 4 times daily as needed until next visit. 4. Begin tapering Restoril 50 mg down to 7.5 mg once nightly as needed ×15 days, then discontinue. Please discuss alternative treatments with your PCP for sleep as soon as possible. Please remember that we can no longer prescribe opioid medications while you are concurrently taking benzodiazepines. 5. Continue Robaxin 500 mg tablet up to 3 times daily. 3 refills. 6. Naloxone 4 mg nasal spray for opioid induced respiratory depression emergency only. 7. Continue to f/u Orthopedic specialist.   8. Please contact our office within 72 hours if you receive outside controlled substance for postsurgical pain. 9. Discussed risks of addiction, dependency, and opioid induced hyperalgesia. 10. Please remember to call at least 4-5 business days prior to your medication refill. 11. Return to clinic in 3 months. Please call and cancel your appointment and pain management agreement if you do decide to transfer your care.

## 2018-07-09 NOTE — MR AVS SNAPSHOT
[address] [facility] [address] 
[phone] Patient: Deana Tadeo MRN: [id number] :1975 Visit Information Date & Time Provider Department Dept. Phone Encounter #  
 2018  9:00 AM Joselito Shaikh, [address] [place] [address] for Pain Management [phone] Follow-up Instructions Return in about 3 months (around 10/9/2018). Upcoming Health Maintenance Date Due DTaP/Tdap/Td series (1 - Tdap) 1997 PAP AKA CERVICAL CYTOLOGY 1997 Influenza Age 5 to Adult 2018 Allergies as of 2018  Review Complete On: 2018 By: SHAGUFTA Goodrich No Known Allergies Current Immunizations  Never Reviewed No immunizations on file. Not reviewed this visit You Were Diagnosed With   
  
 Codes Comments Chronic pain syndrome     ICD-10-CM: G89.4 ICD-9-CM: 338. 4 Chondromalacia of left patella     ICD-10-CM: M22.42 
ICD-9-CM: 717.7 Chronic pain of both knees     ICD-10-CM: M25.561, M25.562, G89.29 ICD-9-CM: 719.46, 338.29 Sleep disturbance     ICD-10-CM: G47.9 ICD-9-CM: 780.50 Vitals BP Pulse Temp Resp Height(growth percentile) Weight(growth percentile) (!) 135/102 97 98.6 °F (37 °C) 16 5' 9\" (1.753 m) 278 lb (126.1 kg) LMP BMI OB Status Smoking Status 2016 41.05 kg/m2 Hysterectomy Former Smoker Vitals History BMI and BSA Data Body Mass Index Body Surface Area 41.05 kg/m 2 2.48 m 2 Your Updated Medication List  
  
   
This list is accurate as of 18  9:18 AM.  Always use your most recent med list.  
  
  
  
  
 amitriptyline 25 mg tablet Commonly known as:  ELAVIL TAKE 1 TABLET BY MOUTH EVERY NIGHT  
  
 gabapentin 300 mg capsule Commonly known as:  NEURONTIN Take 300 mg by mouth three (3) times daily. losartan-hydroCHLOROthiazide 100-25 mg per tablet Commonly known as:  HYZAAR Take 1 Tab by mouth daily. methocarbamol 500 mg tablet Commonly known as:  ROBAXIN Take 1 Tab by mouth three (3) times daily. naloxone 4 mg/actuation nasal spray Commonly known as:  NARCAN  
4 mg by Nasal route as needed. Spray contents of 1 nasal spray in nostril as one single dose. May repeat in 2-3 minutes in alternating nostril until EMS arrives. Indications: OPIATE-INDUCED RESPIRATORY DEPRESSION  
  
 NIFEdipine ER 30 mg ER tablet Commonly known as:  ADALAT CC Take  by mouth daily. * NUCYNTA 50 mg tablet Generic drug:  tapentadol Take 50 mg by mouth three (3) times daily. * tapentadol 75 mg tablet Commonly known as:  Antoni Caprice Take 75 mg by mouth three (3) times daily as needed for Pain for up to 30 days. Max Daily Amount: 225 mg.  
  
 * tapentadol 75 mg tablet Commonly known as:  Antoni Caprice Take 75 mg by mouth four (4) times daily as needed for Pain for up to 30 days. Max Daily Amount: 300 mg. Start taking on:  8/8/2018 * tapentadol 75 mg tablet Commonly known as:  Antoni Caprice Take 75 mg by mouth four (4) times daily as needed for Pain for up to 30 days. Max Daily Amount: 300 mg. Start taking on:  9/7/2018 * oxyMORphone 15 mg Tr12 Commonly known as:  OPANA ER Take 15 mg by mouth every eight (8) hours for 30 days. Max Daily Amount: 45 mg.  
  
 * oxyMORphone 10 mg ER tablet Commonly known as:  OPANA ER Take 1 Tab by mouth every eight (8) hours for 30 days. Max Daily Amount: 30 mg. Start taking on:  8/8/2018 * oxyMORphone 10 mg tablet Commonly known as:  OPANA SR Take 1 Tab by mouth every twelve (12) hours for 30 days. Max Daily Amount: 20 mg.  
Start taking on:  9/7/2018 RANEXA 500 mg SR tablet Generic drug:  ranolazine ER Take 1,000 mg by mouth two (2) times a day. temazepam 7.5 mg capsule Commonly known as:  RESTORIL  
 Take 1 Cap by mouth nightly as needed for Sleep for up to 15 days. Max Daily Amount: 7.5 mg.  
  
 TOPAMAX 50 mg tablet Generic drug:  topiramate Take  by mouth two (2) times a day. VITAMIN D2 50,000 unit capsule Generic drug:  ergocalciferol Take 50,000 Units by mouth every seven (7) days. ZOLOFT 50 mg tablet Generic drug:  sertraline Take  by mouth daily. * Notice: This list has 7 medication(s) that are the same as other medications prescribed for you. Read the directions carefully, and ask your doctor or other care provider to review them with you. Prescriptions Printed Refills  
 tapentadol (NUCYNTA) 75 mg tablet 0 Sig: Take 75 mg by mouth three (3) times daily as needed for Pain for up to 30 days. Max Daily Amount: 225 mg.  
 Class: Print Route: Oral  
 oxyMORphone 15 mg PO TR12 0 Sig: Take 15 mg by mouth every eight (8) hours for 30 days. Max Daily Amount: 45 mg.  
 Class: Print Route: Oral  
 tapentadol (NUCYNTA) 75 mg tablet 0 Starting on: 8/8/2018 Sig: Take 75 mg by mouth four (4) times daily as needed for Pain for up to 30 days. Max Daily Amount: 300 mg. Class: Print Route: Oral  
 tapentadol (NUCYNTA) 75 mg tablet 0 Starting on: 9/7/2018 Sig: Take 75 mg by mouth four (4) times daily as needed for Pain for up to 30 days. Max Daily Amount: 300 mg. Class: Print Route: Oral  
 oxyMORphone 10 mg PO ER tablet 0 Starting on: 8/8/2018 Sig: Take 1 Tab by mouth every eight (8) hours for 30 days. Max Daily Amount: 30 mg.  
 Class: Print Route: Oral  
 oxyMORphone (OPANA SR) 10 mg tablet 0 Starting on: 9/7/2018 Sig: Take 1 Tab by mouth every twelve (12) hours for 30 days. Max Daily Amount: 20 mg.  
 Class: Print Route: Oral  
 temazepam (RESTORIL) 7.5 mg capsule 0 Sig: Take 1 Cap by mouth nightly as needed for Sleep for up to 15 days. Max Daily Amount: 7.5 mg.  
 Class: Print  Route: Oral  
  
 Follow-up Instructions Return in about 3 months (around 10/9/2018). Patient Instructions 1. Continue current plan with no evidence of addiction or diversion. Stable on current medication without adverse events. 2. Refill and adjust Opana ER 20 mg down to 50 mg every 8 hours ×1 month, then adjust down to 10 mg every 8 hours ×1 month, then adjust down to 10 mg every 12 hours until next visit. 3. Refill Nucynta 75 mg up to 3 times daily as needed ×1 month, then adjust up to 4 times daily as needed until next visit. 4. Begin tapering Restoril 50 mg down to 7.5 mg once nightly as needed ×15 days, then discontinue. Please discuss alternative treatments with your PCP for sleep as soon as possible. Please remember that we can no longer prescribe opioid medications while you are concurrently taking benzodiazepines. 5. Continue Robaxin 500 mg tablet up to 3 times daily. 3 refills. 6. Naloxone 4 mg nasal spray for opioid induced respiratory depression emergency only. 7. Continue to f/u Orthopedic specialist.  
8. Please contact our office within 72 hours if you receive outside controlled substance for postsurgical pain. 9. Discussed risks of addiction, dependency, and opioid induced hyperalgesia. 10. Please remember to call at least 4-5 business days prior to your medication refill. 11. Return to clinic in 3 months. Please call and cancel your appointment and pain management agreement if you do decide to transfer your care. Introducing [facility]! Ford Bassett introduces [employer] patient portal. Now you can access parts of your medical record, email your doctor's office, and request medication refills online. 1. In your internet browser, go to [email or web address] 2. Click on the First Time User? Click Here link in the Sign In box. You will see the New Member Sign Up page. 3. Enter your [employer] Access Code exactly as it appears below.  You will not need to use this code after youve completed the sign-up process. If you do not sign up before the expiration date, you must request a new code. · [employer] Access Code: [email or web address] Expires: 10/7/2018  9:18 AM 
 
4. Enter the last four digits of your Social Security Number (xxxx) and Date of Birth (mm/dd/yyyy) as indicated and click Submit. You will be taken to the next sign-up page. 5. Create a [employer] ID. This will be your [employer] login ID and cannot be changed, so think of one that is secure and easy to remember. 6. Create a [employer] password. You can change your password at any time. 7. Enter your Password Reset Question and Answer. This can be used at a later time if you forget your password. 8. Enter your e-mail address. You will receive e-mail notification when new information is available in [address]. 9. Click Sign Up. You can now view and download portions of your medical record. 10. Click the Download Summary menu link to download a portable copy of your medical information. If you have questions, please visit the Frequently Asked Questions section of the [employer] website. Remember, [employer] is NOT to be used for urgent needs. For medical emergencies, dial 911. Now available from your iPhone and Android! Please provide this summary of care documentation to your next provider. Your primary care clinician is listed as Glidden Soles. If you have any questions after today's visit, please call [phone].

## 2018-07-09 NOTE — TELEPHONE ENCOUNTER
Returned pt's call - left vm letting her know yes the pa has been submitted to her insurance, but I have not received an answer.

## 2018-07-09 NOTE — PROGRESS NOTES
Nursing Notes    Patient presents to the office today in follow-up. Patient rates her pain at 8/10 on the numerical pain scale. Reviewed medications with counts as follows:    Rx Date filled Qty Dispensed Pill Count Last Dose Short   Oxymorphone ER 20 mg 06/05/18 90 0 yesterday no   Nucynta 75 mg  06/05/18 90 0 yesterday no         Comments: Patient is here today for a follow up appt today she states her pain level today is an 8  PHQ 9 was done and patient states she is not depressed   Patient states she she was in the hospital for seizures she states she stayed 5 days  Patient has increased bp today I will reassess it today. I will inform the provider of my findings    POC UDS was not performed in office today    Any new labs or imaging since last appointment? Yes     Have you been to an emergency room (ER) or urgent care clinic since your last visit? [facility]          Have you been hospitalized since your last visit? NO     If yes, where, when, and reason for visit? Have you seen or consulted any other health care providers outside of the [facility]  since your last visit? YES [facility]    [facility]   If yes, where, when, and reason for visit? Ms. Juaquin Linder has a reminder for a \"due or due soon\" health maintenance. I have asked that she contact her primary care provider for follow-up on this health maintenance.

## 2018-07-10 ENCOUNTER — TELEPHONE (OUTPATIENT)
Dept: PAIN MANAGEMENT | Age: 43
End: 2018-07-10

## 2018-08-09 ENCOUNTER — TELEPHONE (OUTPATIENT)
Dept: PAIN MANAGEMENT | Age: 43
End: 2018-08-09

## 2018-08-09 RX ORDER — METHOCARBAMOL 500 MG/1
500 TABLET, FILM COATED ORAL 3 TIMES DAILY
Qty: 90 TAB | Refills: 3 | Status: SHIPPED | OUTPATIENT
Start: 2018-08-09 | End: 2019-02-12 | Stop reason: SDUPTHER

## 2018-08-09 NOTE — TELEPHONE ENCOUNTER
Lissette Gregory has called requesting a refill of their controlled medication, Opana,Oxmorphone, for the management of chronic pain. Last office visit date: 07/09/18    Date last  was pulled and reviewed : 08/09/18    Was the patient compliant when the above report was pulled? yes    Analgesia: 50% relief with medication. Aberrancies: none    ADL's: able to perform with medication. Adverse Reaction: Patient denies    Provider's last note and plan of care reviewed? yes  Request forwarded to provider for review.

## 2018-10-05 ENCOUNTER — OFFICE VISIT (OUTPATIENT)
Dept: PAIN MANAGEMENT | Age: 43
End: 2018-10-05

## 2018-10-05 VITALS
RESPIRATION RATE: 18 BRPM | SYSTOLIC BLOOD PRESSURE: 129 MMHG | TEMPERATURE: 96.9 F | HEIGHT: 69 IN | DIASTOLIC BLOOD PRESSURE: 93 MMHG | BODY MASS INDEX: 38.66 KG/M2 | HEART RATE: 78 BPM | WEIGHT: 261 LBS

## 2018-10-05 DIAGNOSIS — M17.0 TRICOMPARTMENT OSTEOARTHRITIS OF BOTH KNEES: ICD-10-CM

## 2018-10-05 DIAGNOSIS — Z79.899 ENCOUNTER FOR LONG-TERM (CURRENT) USE OF HIGH-RISK MEDICATION: ICD-10-CM

## 2018-10-05 DIAGNOSIS — M25.561 CHRONIC PAIN OF BOTH KNEES: Primary | ICD-10-CM

## 2018-10-05 DIAGNOSIS — M25.562 CHRONIC PAIN OF BOTH KNEES: Primary | ICD-10-CM

## 2018-10-05 DIAGNOSIS — G89.29 CHRONIC PAIN OF BOTH KNEES: Primary | ICD-10-CM

## 2018-10-05 DIAGNOSIS — G89.4 CHRONIC PAIN SYNDROME: ICD-10-CM

## 2018-10-05 RX ORDER — OXYMORPHONE HYDROCHLORIDE 7.5 MG/1
7.5 TABLET, FILM COATED, EXTENDED RELEASE ORAL EVERY 12 HOURS
Qty: 60 TAB | Refills: 0 | Status: SHIPPED | OUTPATIENT
Start: 2018-10-07 | End: 2018-11-05 | Stop reason: SDUPTHER

## 2018-10-05 NOTE — PROGRESS NOTES
Nursing Notes Patient presents to the office today in follow-up. Patient rates her pain at 8/10 on the numerical pain scale. Reviewed medications with counts as follows:   
Rx Date filled Qty Dispensed Pill Count Last Dose Short Pt did not bring Rx for ; counseling done and  shows: 
Last opioid agreement 07/09/18 Last urine drug screen 04/09/18 Comments: Patient is here today for a follow up appt today she states her pain level is an 8 PHQ 9 was done patient states she is depressed due to things going on in her life she has had 2 deaths in a short time Patient did not bring in her meds today. She states she went to the ER at [facility] for abdominal pain ct scan she states that she has to see her pcm for a referral  
For surgery. She states she was in pain POC UDS was performed in office today per verbal order per [facility] Any new labs or imaging since last appointment? Yes labs and imaging done at [facility] Have you been to an emergency room (ER) or urgent care clinic since your last visit? Yes [facility] Have you been hospitalized since your last visit? NO If yes, where, when, and reason for visit? Have you seen or consulted any other health care providers outside of the [address] [place] [address]  since your last visit? [facility] If yes, where, when, and reason for visit? Ms. Troy Lay has a reminder for a \"due or due soon\" health maintenance. I have asked that she contact her primary care provider for follow-up on this health maintenance.

## 2018-10-05 NOTE — PATIENT INSTRUCTIONS
Learning About Sleeping Well What does sleeping well mean? Sleeping well means getting enough sleep. How much sleep is enough varies among people. The number of hours you sleep is not as important as how you feel when you wake up. If you do not feel refreshed, you probably need more sleep. Another sign of not getting enough sleep is feeling tired during the day. The average total nightly sleep time is 7½ to 8 hours. Healthy adults may need a little more or a little less than this. Why is getting enough sleep important? Getting enough quality sleep is a basic part of good health. When your sleep suffers, your mood and your thoughts can suffer too. You may find yourself feeling more grumpy or stressed. Not getting enough sleep also can lead to serious problems, including injury, accidents, anxiety, and depression. What might cause poor sleeping? Many things can cause sleep problems, including: · Stress. Stress can be caused by fear about a single event, such as giving a speech. Or you may have ongoing stress, such as worry about work or school. · Depression, anxiety, and other mental or emotional conditions. · Changes in your sleep habits or surroundings. This includes changes that happen where you sleep, such as noise, light, or sleeping in a different bed. It also includes changes in your sleep pattern, such as having jet lag or working a late shift. · Health problems, such as pain, breathing problems, and restless legs syndrome. · Lack of regular exercise. How can you help yourself? Here are some tips that may help you sleep more soundly and wake up feeling more refreshed. Your sleeping area · Use your bedroom only for sleeping and sex. A bit of light reading may help you fall asleep. But if it doesn't, do your reading elsewhere in the house. Don't watch TV in bed. · Be sure your bed is big enough to stretch out comfortably, especially if you have a sleep partner. · Keep your bedroom quiet, dark, and cool. Use curtains, blinds, or a sleep mask to block out light. To block out noise, use earplugs, soothing music, or a \"white noise\" machine. Your evening and bedtime routine · Create a relaxing bedtime routine. You might want to take a warm shower or bath, listen to soothing music, or drink a cup of noncaffeinated tea. · Go to bed at the same time every night. And get up at the same time every morning, even if you feel tired. What to avoid · Limit caffeine (coffee, tea, caffeinated sodas) during the day, and don't have any for at least 4 to 6 hours before bedtime. · Don't drink alcohol before bedtime. Alcohol can cause you to wake up more often during the night. · Don't smoke or use tobacco, especially in the evening. Nicotine can keep you awake. · Don't take naps during the day, especially close to bedtime. · Don't lie in bed awake for too long. If you can't fall asleep, or if you wake up in the middle of the night and can't get back to sleep within 15 minutes or so, get out of bed and go to another room until you feel sleepy. · Don't take medicine right before bed that may keep you awake or make you feel hyper or energized. Your doctor can tell you if your medicine may do this and if you can take it earlier in the day. If you can't sleep · Imagine yourself in a peaceful, pleasant scene. Focus on the details and feelings of being in a place that is relaxing. · Get up and do a quiet or boring activity until you feel sleepy. · Don't drink any liquids after 6 p.m. if you wake up often because you have to go to the bathroom. Where can you learn more? Go to http://delgado-carlota.info/. Enter Y136 in the search box to learn more about \"Learning About Sleeping Well. \" Current as of: December 7, 2017 Content Version: 11.8 © 1993-6410 [employer].  Care instructions adapted under license by [address] (which disclaims liability or warranty for this information). If you have questions about a medical condition or this instruction, always ask your healthcare professional. [address] any warranty or liability for your use of this information. Safe Use of Opioid Pain Medicine: Care Instructions Your Care Instructions Pain is your body's way of warning you that something is wrong. Pain feels different for everybody. Only you can describe your pain. A doctor can suggest or prescribe many types of medicines for pain. These range from over-the-counter medicines like acetaminophen (Tylenol) to powerful medicines called opioids. Examples of opioids are fentanyl, hydrocodone, morphine, and oxycodone. Heroin is an illegal opioid Opioids are strong medicines. They can help you manage pain when you use them the right way. But if you misuse them, they can cause serious harm and even death. For these reasons, doctors are very careful about how they prescribe opioids. If you decide to take opioids, here are some things to remember. · Keep your doctor informed. You can get addicted to opioids. The risk is higher if you have a history of substance use. Your doctor will monitor you closely for signs of misuse and addiction and to figure out when you no longer need to take opioids. · Make a treatment plan. The goal of your plan is to be able to function and do the things you need to do, even if you still have some pain. You might be able to manage your pain with other non-opioid options like physical therapy, relaxation, or over-the-counter pain medicines. · Be aware of the side effects. Opioids can cause serious side effects, such as constipation, dry mouth, and nausea. And over time, you may need a higher dose to get pain relief. This is called tolerance. Your body also gets used to opioids. This is called physical dependence.  If you suddenly stop taking them, you may have withdrawal symptoms. The doctor carefully considered what pain medicine is right for you. You may not have received opioids if your doctor was concerned about drug interactions or your safety, or if he or she had other concerns. It is best to have one doctor or clinic treat your pain. This way you will get the pain medicine that will help you the most. And a doctor will be able to watch for any problems that the medicine might cause. The doctor has checked you carefully, but problems can develop later. If you notice any problems or new symptoms,  get medical treatment right away. Follow-up care is a key part of your treatment and safety. Be sure to make and go to all appointments, and call your doctor if you are having problems. It's also a good idea to know your test results and keep a list of the medicines you take. How can you care for yourself at home? · If you need to take opioids to manage your pain, remember these safety tips. ¨ Follow directions carefully. It's easy to misuse opioids if you take a dose other than what's prescribed by your doctor. This can lead to overdose and even death. Even sharing them with someone they weren't meant for is misuse. ¨ Be cautious. Opioids may affect your judgment and decision making. Do not drive or operate machinery until you can think clearly. Talk with your doctor about when it is safe to drive. ¨ Reduce the risk of drug interactions. Opioids can be dangerous if you take them with alcohol or with certain drugs like sleeping pills and muscle relaxers. Make sure your doctor knows about all the other medicines you take, including over-the-counter medicines. Don't start any new medicines before you talk to your doctor or pharmacist. 
Altagracia Espinoza Keep others safe. Store opioids in a safe and secure place. Make sure that pets, children, friends, and family can't get to them.  When you're done using opioids, make sure to properly dispose of them. You can either use a community drug take-back program or your drugstore's mail-back program. If one of these programs isn't available, you can flush opioid skin patches and unused opioid pills down the toilet. ¨ Reduce the risk of overdose. Misuse of opioids can be very dangerous. Protect yourself by asking your doctor about a naloxone rescue kit. It can help youand even save your lifeif you take too much of an opioid. · Try other ways to reduce pain. ¨ Relax, and reduce stress. Relaxation techniques such as deep breathing or meditation can help. ¨ Keep moving. Gentle, daily exercise can help reduce pain over the long run. Try low- or no-impact exercises such as walking, swimming, and stationary biking. Do stretches to stay flexible. ¨ Try heat, cold packs, and massage. ¨ Get enough sleep. Pain can make you tired and drain your energy. Talk with your doctor if you have trouble sleeping because of pain. ¨ Think positive. Your thoughts can affect your pain level. Do things that you enjoy to distract yourself when you have pain instead of focusing on the pain. See a movie, read a book, listen to music, or spend time with a friend. · If you are not taking a prescription pain medicine, ask your doctor if you can take an over-the-counter medicine. When should you call for help? Call your doctor now or seek immediate medical care if: 
  · You have a new kind of pain.  
  · You have new symptoms, such as a fever or rash, along with the pain.  
 Watch closely for changes in your health, and be sure to contact your doctor if: 
  · You think you might be using too much pain medicine, and you need help to use less or stop.  
  · Your pain gets worse.  
  · You would like a referral to a doctor or clinic that specializes in pain management. Where can you learn more? Go to http://delgado-carlota.info/. Enter R108 in the search box to learn more about \"Safe Use of Opioid Pain Medicine: Care Instructions. \" Current as of: September 10, 2017 Content Version: 11.8 © 1888-7945 [employer], [employer]. Care instructions adapted under license by [employer] (which disclaims liability or warranty for this information). If you have questions about a medical condition or this instruction, always ask your healthcare professional. [place] [address] any warranty or liability for your use of this information.

## 2018-10-05 NOTE — MR AVS SNAPSHOT
[address] [facility] [id number] [phone] Patient: Reji Rosales MRN: [id number] :1975 Visit Information Date & Time Provider Department Dept. Phone Encounter #  
 10/5/2018  1:45 PM Merrick Karimi NP [address] [facility] [id number] Follow-up Instructions Return in about 3 months (around 2019). Upcoming Health Maintenance Date Due DTaP/Tdap/Td series (1 - Tdap) 1997 PAP AKA CERVICAL CYTOLOGY 1997 Influenza Age 5 to Adult 2018 Allergies as of 10/5/2018  Review Complete On: 10/5/2018 By: Merrick Karimi NP No Known Allergies Current Immunizations  Never Reviewed No immunizations on file. Not reviewed this visit You Were Diagnosed With   
  
 Codes Comments Chronic pain of both knees    -  Primary ICD-10-CM: M25.561, M25.562, G89.29 ICD-9-CM: 719.46, 338.29 Chronic pain syndrome     ICD-10-CM: G89.4 ICD-9-CM: 338.4 Encounter for long-term (current) use of high-risk medication     ICD-10-CM: Z79.899 ICD-9-CM: V58.69 Chondromalacia of left patella     ICD-10-CM: M22.42 
ICD-9-CM: 717.7 Vitals BP Pulse Temp Resp Height(growth percentile) Weight(growth percentile) (!) 129/93 (BP 1 Location: Right arm, BP Patient Position: Sitting) 78 96.9 °F (36.1 °C) 18 5' 9\" (1.753 m) 261 lb (118.4 kg) LMP BMI OB Status Smoking Status 2016 38.54 kg/m2 Hysterectomy Former Smoker BMI and BSA Data Body Mass Index Body Surface Area 38.54 kg/m 2 2.4 m 2 Your Updated Medication List  
  
   
This list is accurate as of 10/5/18  2:49 PM.  Always use your most recent med list.  
  
  
  
  
 amitriptyline 25 mg tablet Commonly known as:  ELAVIL TAKE 1 TABLET BY MOUTH EVERY NIGHT  
  
 gabapentin 300 mg capsule Commonly known as:  NEURONTIN  
 Take 300 mg by mouth three (3) times daily. losartan-hydroCHLOROthiazide 100-25 mg per tablet Commonly known as:  HYZAAR Take 1 Tab by mouth daily. methocarbamol 500 mg tablet Commonly known as:  ROBAXIN Take 1 Tab by mouth three (3) times daily. naloxone 4 mg/actuation nasal spray Commonly known as:  NARCAN  
4 mg by Nasal route as needed. Spray contents of 1 nasal spray in nostril as one single dose. May repeat in 2-3 minutes in alternating nostril until EMS arrives. Indications: OPIATE-INDUCED RESPIRATORY DEPRESSION  
  
 NIFEdipine ER 30 mg ER tablet Commonly known as:  ADALAT CC Take  by mouth daily. * NUCYNTA 50 mg tablet Generic drug:  tapentadol Take 50 mg by mouth three (3) times daily. * tapentadol 75 mg tablet Commonly known as:  Araceli Gisell Take 75 mg by mouth four (4) times daily as needed for Pain for up to 30 days. Max Daily Amount: 300 mg. Start taking on:  10/7/2018 * oxyMORphone 10 mg tablet Commonly known as:  OPANA SR Take 1 Tab by mouth every twelve (12) hours for 30 days. Max Daily Amount: 20 mg.  
  
 * oxyMORphone 7.5 mg tablet Commonly known as:  OPANA SR Take 1 Tab by mouth every twelve (12) hours for 30 days. Max Daily Amount: 15 mg. Start taking on:  10/7/2018 RANEXA 500 mg SR tablet Generic drug:  ranolazine ER Take 1,000 mg by mouth two (2) times a day. TOPAMAX 50 mg tablet Generic drug:  topiramate Take  by mouth two (2) times a day. VITAMIN D2 50,000 unit capsule Generic drug:  ergocalciferol Take 50,000 Units by mouth every seven (7) days. ZOLOFT 50 mg tablet Generic drug:  sertraline Take  by mouth daily. * Notice: This list has 4 medication(s) that are the same as other medications prescribed for you. Read the directions carefully, and ask your doctor or other care provider to review them with you. Prescriptions Printed Refills oxyMORphone 7.5 mg PO tablet 0 Starting on: 10/7/2018 Sig: Take 1 Tab by mouth every twelve (12) hours for 30 days. Max Daily Amount: 15 mg.  
 Class: Print Route: Oral  
 tapentadol (NUCYNTA) 75 mg tablet 0 Starting on: 10/7/2018 Sig: Take 75 mg by mouth four (4) times daily as needed for Pain for up to 30 days. Max Daily Amount: 300 mg. Class: Print Route: Oral  
  
We Performed the Following AMB POC DRUG SCREEN () [ [facility]] DRUG SCREEN [[id number] Custom] Follow-up Instructions Return in about 3 months (around 1/5/2019). Patient Instructions Learning About Sleeping Well What does sleeping well mean? Sleeping well means getting enough sleep. How much sleep is enough varies among people. The number of hours you sleep is not as important as how you feel when you wake up. If you do not feel refreshed, you probably need more sleep. Another sign of not getting enough sleep is feeling tired during the day. The average total nightly sleep time is 7½ to 8 hours. Healthy adults may need a little more or a little less than this. Why is getting enough sleep important? Getting enough quality sleep is a basic part of good health. When your sleep suffers, your mood and your thoughts can suffer too. You may find yourself feeling more grumpy or stressed. Not getting enough sleep also can lead to serious problems, including injury, accidents, anxiety, and depression. What might cause poor sleeping? Many things can cause sleep problems, including: · Stress. Stress can be caused by fear about a single event, such as giving a speech. Or you may have ongoing stress, such as worry about work or school. · Depression, anxiety, and other mental or emotional conditions. · Changes in your sleep habits or surroundings.  This includes changes that happen where you sleep, such as noise, light, or sleeping in a different bed. It also includes changes in your sleep pattern, such as having jet lag or working a late shift. · Health problems, such as pain, breathing problems, and restless legs syndrome. · Lack of regular exercise. How can you help yourself? Here are some tips that may help you sleep more soundly and wake up feeling more refreshed. Your sleeping area · Use your bedroom only for sleeping and sex. A bit of light reading may help you fall asleep. But if it doesn't, do your reading elsewhere in the house. Don't watch TV in bed. · Be sure your bed is big enough to stretch out comfortably, especially if you have a sleep partner. · Keep your bedroom quiet, dark, and cool. Use curtains, blinds, or a sleep mask to block out light. To block out noise, use earplugs, soothing music, or a \"white noise\" machine. Your evening and bedtime routine · Create a relaxing bedtime routine. You might want to take a warm shower or bath, listen to soothing music, or drink a cup of noncaffeinated tea. · Go to bed at the same time every night. And get up at the same time every morning, even if you feel tired. What to avoid · Limit caffeine (coffee, tea, caffeinated sodas) during the day, and don't have any for at least 4 to 6 hours before bedtime. · Don't drink alcohol before bedtime. Alcohol can cause you to wake up more often during the night. · Don't smoke or use tobacco, especially in the evening. Nicotine can keep you awake. · Don't take naps during the day, especially close to bedtime. · Don't lie in bed awake for too long. If you can't fall asleep, or if you wake up in the middle of the night and can't get back to sleep within 15 minutes or so, get out of bed and go to another room until you feel sleepy. · Don't take medicine right before bed that may keep you awake or make you feel hyper or energized. Your doctor can tell you if your medicine may do this and if you can take it earlier in the day. If you can't sleep · Imagine yourself in a peaceful, pleasant scene. Focus on the details and feelings of being in a place that is relaxing. · Get up and do a quiet or boring activity until you feel sleepy. · Don't drink any liquids after 6 p.m. if you wake up often because you have to go to the bathroom. Where can you learn more? Go to http://delgado-carlota.info/. Enter T011 in the search box to learn more about \"Learning About Sleeping Well. \" Current as of: December 7, 2017 Content Version: 11.8 © 7674-3839 [employer]. Care instructions adapted under license by [employer] (which disclaims liability or warranty for this information). If you have questions about a medical condition or this instruction, always ask your healthcare professional. [address] any warranty or liability for your use of this information. Safe Use of Opioid Pain Medicine: Care Instructions Your Care Instructions Pain is your body's way of warning you that something is wrong. Pain feels different for everybody. Only you can describe your pain. A doctor can suggest or prescribe many types of medicines for pain. These range from over-the-counter medicines like acetaminophen (Tylenol) to powerful medicines called opioids. Examples of opioids are fentanyl, hydrocodone, morphine, and oxycodone. Heroin is an illegal opioid Opioids are strong medicines. They can help you manage pain when you use them the right way. But if you misuse them, they can cause serious harm and even death. For these reasons, doctors are very careful about how they prescribe opioids. If you decide to take opioids, here are some things to remember. · Keep your doctor informed. You can get addicted to opioids. The risk is higher if you have a history of substance use. Your doctor will monitor you closely for signs of misuse and addiction and to figure out when you no longer need to take opioids. · Make a treatment plan. The goal of your plan is to be able to function and do the things you need to do, even if you still have some pain. You might be able to manage your pain with other non-opioid options like physical therapy, relaxation, or over-the-counter pain medicines. · Be aware of the side effects. Opioids can cause serious side effects, such as constipation, dry mouth, and nausea. And over time, you may need a higher dose to get pain relief. This is called tolerance. Your body also gets used to opioids. This is called physical dependence. If you suddenly stop taking them, you may have withdrawal symptoms. The doctor carefully considered what pain medicine is right for you. You may not have received opioids if your doctor was concerned about drug interactions or your safety, or if he or she had other concerns. It is best to have one doctor or clinic treat your pain. This way you will get the pain medicine that will help you the most. And a doctor will be able to watch for any problems that the medicine might cause. The doctor has checked you carefully, but problems can develop later. If you notice any problems or new symptoms,  get medical treatment right away. Follow-up care is a key part of your treatment and safety. Be sure to make and go to all appointments, and call your doctor if you are having problems. It's also a good idea to know your test results and keep a list of the medicines you take. How can you care for yourself at home? · If you need to take opioids to manage your pain, remember these safety tips. ¨ Follow directions carefully. It's easy to misuse opioids if you take a dose other than what's prescribed by your doctor. This can lead to overdose and even death. Even sharing them with someone they weren't meant for is misuse. ¨ Be cautious. Opioids may affect your judgment and decision making. Do not drive or operate machinery until you can think clearly.  Talk with your doctor about when it is safe to drive. ¨ Reduce the risk of drug interactions. Opioids can be dangerous if you take them with alcohol or with certain drugs like sleeping pills and muscle relaxers. Make sure your doctor knows about all the other medicines you take, including over-the-counter medicines. Don't start any new medicines before you talk to your doctor or pharmacist. 
Bessie Regulus Keep others safe. Store opioids in a safe and secure place. Make sure that pets, children, friends, and family can't get to them. When you're done using opioids, make sure to properly dispose of them. You can either use a community drug take-back program or your drugstore's mail-back program. If one of these programs isn't available, you can flush opioid skin patches and unused opioid pills down the toilet. ¨ Reduce the risk of overdose. Misuse of opioids can be very dangerous. Protect yourself by asking your doctor about a naloxone rescue kit. It can help youand even save your lifeif you take too much of an opioid. · Try other ways to reduce pain. ¨ Relax, and reduce stress. Relaxation techniques such as deep breathing or meditation can help. ¨ Keep moving. Gentle, daily exercise can help reduce pain over the long run. Try low- or no-impact exercises such as walking, swimming, and stationary biking. Do stretches to stay flexible. ¨ Try heat, cold packs, and massage. ¨ Get enough sleep. Pain can make you tired and drain your energy. Talk with your doctor if you have trouble sleeping because of pain. ¨ Think positive. Your thoughts can affect your pain level. Do things that you enjoy to distract yourself when you have pain instead of focusing on the pain. See a movie, read a book, listen to music, or spend time with a friend. · If you are not taking a prescription pain medicine, ask your doctor if you can take an over-the-counter medicine. When should you call for help? Call your doctor now or seek immediate medical care if:   · You have a new kind of pain.  
  · You have new symptoms, such as a fever or rash, along with the pain.  
 Watch closely for changes in your health, and be sure to contact your doctor if: 
  · You think you might be using too much pain medicine, and you need help to use less or stop.  
  · Your pain gets worse.  
  · You would like a referral to a doctor or clinic that specializes in pain management. Where can you learn more? Go to http://delgado-carlota.info/. Enter R108 in the search box to learn more about \"Safe Use of Opioid Pain Medicine: Care Instructions. \" Current as of: September 10, 2017 Content Version: 11.8 © 3250-3770 [employer]. Care instructions adapted under license by [employer] (which disclaims liability or warranty for this information). If you have questions about a medical condition or this instruction, always ask your healthcare professional. [address] any warranty or liability for your use of this information. Introducing [facility]! Cathryn Gu introduces Cordium patient portal. Now you can access parts of your medical record, email your doctor's office, and request medication refills online. 1. In your internet browser, go to [email or web address] Online 2. Click on the First Time User? Click Here link in the Sign In box. You will see the New Member Sign Up page. 3. Enter your Cordium Access Code exactly as it appears below. You will not need to use this code after youve completed the sign-up process. If you do not sign up before the expiration date, you must request a new code. · Cordium Access Code: [email or web address] Expires: 10/7/2018  9:18 AM 
 
4. Enter the last four digits of your Social Security Number (xxxx) and Date of Birth (mm/dd/yyyy) as indicated and click Submit. You will be taken to the next sign-up page. 5. Create a CloudHashing ID. This will be your CloudHashing login ID and cannot be changed, so think of one that is secure and easy to remember. 6. Create a CloudHashing password. You can change your password at any time. 7. Enter your Password Reset Question and Answer. This can be used at a later time if you forget your password. 8. Enter your e-mail address. You will receive e-mail notification when new information is available in [address] 9. Click Sign Up. You can now view and download portions of your medical record. 10. Click the Download Summary menu link to download a portable copy of your medical information. If you have questions, please visit the Frequently Asked Questions section of the CloudHashing website. Remember, CloudHashing is NOT to be used for urgent needs. For medical emergencies, dial 911. Now available from your iPhone and Android! Please provide this summary of care documentation to your next provider. Your primary care clinician is listed as Kinjal Allen. If you have any questions after today's visit, please call [phone].

## 2018-10-05 NOTE — PROGRESS NOTES
Referral date 4/24/13, source Ortho and for knee pain. HPI: 
Enrike Portillo is a 40 y.o. female here for f/u visit for ongoing evaluation of chronic shayne knees. Pt was last seen here on 7/9/18. Pt denies interval changes on the character or distribution of pain. Pain is located shayne knees. The pain is described as stabbing, shooting, throbbing,aching and stiffness. Pain at its best is 8/10. Pain at its worse is 10/10. The pain is worsened by standing, walking, prolonged sitting, prolonged laying. Symptoms are relieved by rest, avoiding painful activities, cortisone injections, elevate legs, ice packs. Pt has tried knee braces, motrin with no perceived benefit. Since last visit, Pt has had recent family loss. Pt has not had visco supplimentation injections. Social History Social History  Marital status: SINGLE Spouse name: N/A  
 Number of children: N/A  
 Years of education: N/A Occupational History  Not on file. Social History Main Topics  Smoking status: Former Smoker  Smokeless tobacco: Never Used  Alcohol use No  
 Drug use: No  
 Sexual activity: No  
 
Other Topics Concern  Not on file Social History Narrative Family History Problem Relation Age of Onset  Heart Attack Father  Hypertension Mother No Known Allergies Past Medical History:  
Diagnosis Date  Arthritis  Back pain  Difficulty urinating  Dizziness DUE TO HEADACHES  
 Hypertension  Joint pain  Migraine  Morbid obesity ([address]  Pre-diabetes  Seizure ([address]  Sleep apnea CPAP  
 Stroke [facility]) 2010 Past Surgical History:  
Procedure Laterality Date  HX ACL RECONSTRUCTION    
 HX HERNIA REPAIR  [id number]/[id number]  
 umbilical   
 HX HYSTERECTOMY  08/2016 TLH with BSO  HX KNEE ARTHROSCOPY Current Outpatient Prescriptions on File Prior to Visit Medication Sig  
 methocarbamol (ROBAXIN) 500 mg tablet Take 1 Tab by mouth three (3) times daily.  tapentadol (NUCYNTA) 50 mg tablet Take 50 mg by mouth three (3) times daily.  oxyMORphone (OPANA SR) 10 mg tablet Take 1 Tab by mouth every twelve (12) hours for 30 days. Max Daily Amount: 20 mg.  
 amitriptyline (ELAVIL) 25 mg tablet TAKE 1 TABLET BY MOUTH EVERY NIGHT  sertraline (ZOLOFT) 50 mg tablet Take  by mouth daily.  topiramate (TOPAMAX) 50 mg tablet Take  by mouth two (2) times a day.  naloxone (NARCAN) 4 mg/actuation spry 4 mg by Nasal route as needed. Spray contents of 1 nasal spray in nostril as one single dose. May repeat in 2-3 minutes in alternating nostril until EMS arrives. Indications: OPIATE-INDUCED RESPIRATORY DEPRESSION  
 losartan-hydrochlorothiazide (HYZAAR) 100-25 mg per tablet Take 1 Tab by mouth daily.  NIFEdipine ER (ADALAT CC) 30 mg ER tablet Take  by mouth daily.  ranolazine ER (RANEXA) 500 mg SR tablet Take 1,000 mg by mouth two (2) times a day.  ergocalciferol (VITAMIN D2) 50,000 unit capsule Take 50,000 Units by mouth every seven (7) days.  gabapentin (NEURONTIN) 300 mg capsule Take 300 mg by mouth three (3) times daily. No current facility-administered medications on file prior to visit. ROS: 
Denies fever, nausea, vomiting,constipation, abdominal pain, chest pain, shortness or breath/trouble breathing, trouble swallowing, changes in vision, changes in hearing, falls, dizziness, bladder incontinence, bowel incontinence,suicidal ideations, homicidal ideations or alcohol use. Positive findings include chills, diarrhea, weakness, depression, anxiety Opioid specific risk: Sleep apnea, obesity. Opioid specific history: Concurrent opioid use since approximately since 2013 with no opioid holiday. Vitals:  
 10/05/18 1412 BP: (!) 129/93 Pulse: 78 Resp: 18 Temp: 96.9 °F (36.1 °C) Weight: 118.4 kg (261 lb) Height: 5' 9\" (1.753 m) PainSc:   8 PainLoc: Leg  
LMP: 07/23/2016 Imaging: \"\"\"\" 6/20/16 x-ray knee limited right IMPRESSION: 
1.  No evidence for fracture or dislocation. 2.  Mild degenerative changes. \"\"\" \"\"\"\" 6/20/16 x-ray knee limited left IMPRESSION: 
1.  No evidence for fracture or dislocation. 2.  Mild degenerative changes. \"\"\" Labs: BUN/Cr: \"\"\"\" 9/22/18   21/0.8\"\"\" AST/ALT: \"\"\"\" 9/22/18 20/22\"\"\"\" Physical exam: 
AFVS elevated BP, no acute distress, overweight body habitus. A&OXs 3. Normocephalic, atraumatic. Conjugate gaze, clear sclerae. Speech is clear and appropriate. Mood is appropriate and patient is cooperative. Gait and balance are within functional limits. Non-labored breathing. Lungs CTA B/L. No wheezing, rales or rhonchi. Heart RRR with S1 and S2 noted. No murmurs. LE:  
Strength for right lower extremity is 5/5 for hip flexion, knee extension, dorsiflexion, extensor hallucis longus, plantar flexion. Strength for left lower extremity is 5/5 for hip flexion, knee extension, dorsiflexion, extensor hallucis longus, plantar flexion. Garrett knee crepitus noted with flexion/extension Slight edema noted on medial side of Garrett knees Garrett knees joint line TTP Garrett knee no erythema, warmth noted Calculated MEQ - 180 Naloxone rescue - yes Prophylactic bowel program - no Date of last OCA 8/7/18 Last UDS 4/9/18, consistent and today sent for confirmatory results  date checked today, findings consistent Primary Care Physician 
Yvonne Hwang [id number] [id number] 
[phone] Today   Last Visit  Prior Visit ORT -       
PGIC - not completed AL - not completed  80% COMM - not completed 26 PHQ -- . PHQ over the last two weeks 10/5/2018 PHQ Not Done Active Diagnosis of Depression or Bipolar Disorder Little interest or pleasure in doing things - Feeling down, depressed, irritable, or hopeless - Total Score PHQ 2 -  
 
 
DSM V-OUD Screen -deferred Assessment/Plan: ICD-10-CM ICD-9-CM 1. Chronic pain of both knees M25.561 719.46 DRUG SCREEN  
 M25.562 338.29 AMB POC DRUG SCREEN () G89.29  oxyMORphone 7.5 mg PO tablet  
   tapentadol (NUCYNTA) 75 mg tablet 2. Chronic pain syndrome G89.4 338.4 DRUG SCREEN  
   AMB POC DRUG SCREEN () oxyMORphone 7.5 mg PO tablet  
   tapentadol (NUCYNTA) 75 mg tablet 3. Encounter for long-term (current) use of high-risk medication Z79.899 V58.69 DRUG SCREEN  
   AMB POC DRUG SCREEN () oxyMORphone 7.5 mg PO tablet  
   tapentadol (NUCYNTA) 75 mg tablet 4. Tricompartment osteoarthritis of both knees M17.0 715.96   
  
 
UDS today Pt to take OTC Tylenol 500 mg 1-2 tabs up to TID PRN pain. Max 3000 mg daily dose. Refer to Dr Lutz for visco supplementation for shayne knees Patient taking Robaxin 500 mg tablet up to 3 times daily patient has refills. Do not recommend long term opioid therapy for this patient at this time. Pt currently taking tapentadol 75 mg tablet 1 tablet 4 times daily as needed for pain and oxymorphone 10 mg tablet 1 tablet every 12 hours for pain. Their MME is 180. Today, we will continue the weaning of patients opioid medication with a goal of being opioid free, pending safety and compliance. Pt instructed to call if they experience any signs of withdrawal (diarrhea, nausea, vomiting, sweating or chills, agitation, itching). Today, pt given prescription for oxymorphone 7.5 tablet 1 tablet every 12 hours for pain and tapentadol 75 mg tablet 1 tablet 4 times daily as needed for pain. Their new MME is 165. Will address short-acting opioid once extended release has been discontinued. Pt instructed to call 7-10 days before they run out of their medications for refill. At this time pt will be provided with oxymorphone 5 tablet 1 tablet every 12 hours for pain and tapentadol 75 mg tablet 1 tablet 4 times daily as needed for pain. pending safety and compliance. At following refill, pt will be provided with oxymorphone 5 tablet 1 tablet every 24 hours for pain and tapentadol 75 mg tablet 1 tablet 4 times daily as needed for pain. pending safety and compliance. At next office visit, the plan is to provide patient with D/C oxymorphone and continue tapentadol 75 mg tablet 1 tablet 4 times daily as needed for pain. Their new MME will be 120. If patient has difficulty with the wean or difficulty with cravings we will consider referral to mental health for ongoing assessment and treatment for opioid use disorder. Follow up ongoing assessment and ongoing development of integrative and comprehensive plan of care for chronic pain. Goals: To establish complementary and integrative plan of care to address chronic pain issues while minimizing pharmaceuticals to maximize patient's function improve quality of life. Education: 
Patient again educated on the importance of strict compliance with the opioid care agreement while on opioid therapy. Patient also again educated that they should avoid driving while on chronic opioid therapy. Also advised to avoid alcohol and to avoid benzodiazepines while on opioid therapy. Handouts given regarding opioid safety ans sleep health. Patient Homework: 
Continue to independently investigate visco supplimentation Follow-up Disposition: 
Return in about 3 months (around 1/5/2019). [address] was used for portions of this report. Unintended errors may occur.

## 2018-10-26 ENCOUNTER — TELEPHONE (OUTPATIENT)
Dept: PAIN MANAGEMENT | Age: 43
End: 2018-10-26

## 2018-10-26 NOTE — TELEPHONE ENCOUNTER
Contacted patient regarding need to reschedule her consult on 11/05/18 due to provider mandatory block time. Patient states actually she will call to reschedule as she has recently been to the ER x2 regarding intense abdominal pain. A cyst was found and patient stated she has an appointment next week with her primary care to find out what her next steps should be. She stated in the hospital she was given a prednisone pack, and that her normal pain medications were not touching this new pain in the slightest, she is currently not able to get out of bed. I wished the patient well and let her know she may call back whenever she is ready to work on rescheduling.

## 2018-11-02 DIAGNOSIS — Z79.899 ENCOUNTER FOR LONG-TERM (CURRENT) USE OF HIGH-RISK MEDICATION: ICD-10-CM

## 2018-11-02 DIAGNOSIS — G89.29 CHRONIC PAIN OF BOTH KNEES: ICD-10-CM

## 2018-11-02 DIAGNOSIS — G89.4 CHRONIC PAIN SYNDROME: ICD-10-CM

## 2018-11-02 DIAGNOSIS — M25.561 CHRONIC PAIN OF BOTH KNEES: ICD-10-CM

## 2018-11-02 DIAGNOSIS — M25.562 CHRONIC PAIN OF BOTH KNEES: ICD-10-CM

## 2018-11-02 NOTE — TELEPHONE ENCOUNTER
Patient called about medication refill(Nucynta and Oxymorphone)    60%  Yes  No    Please give patient a call [phone]

## 2018-11-05 RX ORDER — OXYMORPHONE HYDROCHLORIDE 5 MG/1
5 TABLET, FILM COATED, EXTENDED RELEASE ORAL EVERY 12 HOURS
Qty: 60 TAB | Refills: 0 | Status: SHIPPED | OUTPATIENT
Start: 2018-11-05 | End: 2018-12-04 | Stop reason: SDUPTHER

## 2018-11-05 RX ORDER — NALOXONE HYDROCHLORIDE 4 MG/.1ML
SPRAY NASAL
Qty: 2 EACH | Refills: 0 | Status: SHIPPED | OUTPATIENT
Start: 2018-11-05

## 2018-11-05 NOTE — TELEPHONE ENCOUNTER
Reviewed, Narcan sent to [facility] pharmacy on [address], please have patient provide UDS prior to picking up prescription, okay to distribute prescription

## 2018-11-05 NOTE — TELEPHONE ENCOUNTER
Kasie Melo has called requesting a refill of their controlled medication, nucynta and oxymorphone, for the management of knee pain. Last office visit date: 10/5/18 with Brady Perkins, next 1/4/19 with Brady Perkins    Date last  was pulled and reviewed : 11/5/18 last filled nucnta 0n10/9/18 and oxymorphone 10/13/18    Was the patient compliant when the above report was pulled? yes    Analgesia: 60%    Aberrancies: none    ADL's: yes    Adverse Reaction: none    Provider's last note and plan of care reviewed? yes  Request forwarded to provider for review.   Contract 7/9/18   UDS 4/12/18

## 2018-11-06 ENCOUNTER — OFFICE VISIT (OUTPATIENT)
Dept: PAIN MANAGEMENT | Age: 43
End: 2018-11-06

## 2018-11-06 DIAGNOSIS — Z79.899 ENCOUNTER FOR LONG-TERM (CURRENT) USE OF HIGH-RISK MEDICATION: Primary | ICD-10-CM

## 2018-11-06 NOTE — TELEPHONE ENCOUNTER
Patient identified using two patient identifiers (name and ); patient advised prescriptions are ready for pick-up. Informed patient of Narcan sent to [facility] pharmacy.

## 2018-12-03 DIAGNOSIS — G89.29 CHRONIC PAIN OF BOTH KNEES: ICD-10-CM

## 2018-12-03 DIAGNOSIS — G89.4 CHRONIC PAIN SYNDROME: ICD-10-CM

## 2018-12-03 DIAGNOSIS — M25.561 CHRONIC PAIN OF BOTH KNEES: ICD-10-CM

## 2018-12-03 DIAGNOSIS — Z79.899 ENCOUNTER FOR LONG-TERM (CURRENT) USE OF HIGH-RISK MEDICATION: ICD-10-CM

## 2018-12-03 DIAGNOSIS — M25.562 CHRONIC PAIN OF BOTH KNEES: ICD-10-CM

## 2018-12-03 NOTE — TELEPHONE ENCOUNTER
Patient called the office today concerning her medication refill request    ROBAXIN,NUCYNTA,OXYMORPHONE    60% PAIN RELIEF    YES - ABLE TO PERM DAILY TASK    NO - SIDE AFFECTS     Please give patient a call when meds are ready @ [phone]

## 2018-12-04 RX ORDER — OXYMORPHONE HYDROCHLORIDE 5 MG/1
5 TABLET, FILM COATED, EXTENDED RELEASE ORAL EVERY 24 HOURS
Qty: 30 TAB | Refills: 0 | Status: SHIPPED | OUTPATIENT
Start: 2018-12-14 | End: 2019-01-13

## 2018-12-04 NOTE — TELEPHONE ENCOUNTER
Hilton Posadas has called requesting a refill of their controlled medication, oxymorphone and nucynta , for the management of chronic generalized pain . Last office visit date: 10/05/18  Last opioid care agreement 07/2018  Last UDS was done 11/2018    Date last  was pulled and reviewed : 12/04/18    Was the patient compliant when the above report was pulled? yes    Analgesia: 60% pain relief noted     Aberrancies: none noted     ADL's: Patient is able to complete adl care. Adverse Reaction: none noted    Provider's last note and plan of care reviewed? yes  Request forwarded to provider for review.

## 2018-12-05 ENCOUNTER — TELEPHONE (OUTPATIENT)
Dept: PAIN MANAGEMENT | Age: 43
End: 2018-12-05

## 2018-12-05 ENCOUNTER — OFFICE VISIT (OUTPATIENT)
Dept: PAIN MANAGEMENT | Age: 43
End: 2018-12-05

## 2018-12-05 DIAGNOSIS — Z79.899 ENCOUNTER FOR LONG-TERM (CURRENT) USE OF HIGH-RISK MEDICATION: Primary | ICD-10-CM

## 2018-12-05 NOTE — TELEPHONE ENCOUNTER
Patient identified using two patient identifiers (name and ) Patient was informed that her script was ready for .  The patient verbalized understanding of the phone call

## 2018-12-13 DIAGNOSIS — M62.838 MUSCLE SPASM: Primary | ICD-10-CM

## 2018-12-13 RX ORDER — METHOCARBAMOL 500 MG/1
500 TABLET, FILM COATED ORAL 3 TIMES DAILY
Qty: 90 TAB | Refills: 0 | Status: SHIPPED | OUTPATIENT
Start: 2018-12-13 | End: 2019-01-04 | Stop reason: SDUPTHER

## 2018-12-14 NOTE — TELEPHONE ENCOUNTER
Patient identified using two patient identifiers (name and ); patient advised prescriptions called into [address] Roxane Sweet

## 2019-01-04 ENCOUNTER — OFFICE VISIT (OUTPATIENT)
Dept: PAIN MANAGEMENT | Age: 44
End: 2019-01-04

## 2019-01-04 VITALS
RESPIRATION RATE: 14 BRPM | WEIGHT: 268 LBS | TEMPERATURE: 97.2 F | SYSTOLIC BLOOD PRESSURE: 152 MMHG | HEIGHT: 69 IN | HEART RATE: 92 BPM | OXYGEN SATURATION: 99 % | BODY MASS INDEX: 39.69 KG/M2 | DIASTOLIC BLOOD PRESSURE: 107 MMHG

## 2019-01-04 DIAGNOSIS — M25.561 CHRONIC PAIN OF BOTH KNEES: Primary | ICD-10-CM

## 2019-01-04 DIAGNOSIS — M17.0 TRICOMPARTMENT OSTEOARTHRITIS OF BOTH KNEES: ICD-10-CM

## 2019-01-04 DIAGNOSIS — Z79.899 ENCOUNTER FOR LONG-TERM (CURRENT) USE OF HIGH-RISK MEDICATION: ICD-10-CM

## 2019-01-04 DIAGNOSIS — G89.4 CHRONIC PAIN SYNDROME: ICD-10-CM

## 2019-01-04 DIAGNOSIS — M25.562 CHRONIC PAIN OF BOTH KNEES: Primary | ICD-10-CM

## 2019-01-04 DIAGNOSIS — G89.29 CHRONIC PAIN OF BOTH KNEES: Primary | ICD-10-CM

## 2019-01-04 NOTE — PROGRESS NOTES
Nursing Notes    Patient presents to the office today in follow-up. Patient rates her pain at 7/10 on the numerical pain scale. Reviewed medications with counts as follows:    Rx Date filled Qty Dispensed Pill Count Last Dose Short   Oxymorphone ER 5 mg 12/12/18 30 20 yesterday    nucynta 75 mg 12/5/18 120 7 today                               Pt did not bring Rx for oxymorphone and nucynta; counseling done and  shows:12/12/18 on oxymorphone and 12/5/18 on nucynta. The pt was counseled about bringing all of our medication bottle to their appt. Last opioid agreement 7/9/18  Last urine drug screen 12/5/18  today  PHQ over the last two weeks 1/4/2019   PHQ Not Done -   Little interest or pleasure in doing things Several days   Feeling down, depressed, irritable, or hopeless Several days   Total Score PHQ 2 2   B/p 154/112 repeat 152/107 provider made aware. Comments:     POC UDS was performed in office today    Any new labs or imaging since last appointment? NO    Have you been to an emergency room (ER) or urgent care clinic since your last visit? NO            Have you been hospitalized since your last visit? NO     If yes, where, when, and reason for visit? Have you seen or consulted any other health care providers outside of the [address] [place] [address]  since your last visit? NO     If yes, where, when, and reason for visit? Ms. Ksenia Winslow has a reminder for a \"due or due soon\" health maintenance. I have asked that she contact her primary care provider for follow-up on this health maintenance.

## 2019-01-04 NOTE — PROGRESS NOTES
Referral date 13, source Ortho and for knee pain. HPI:  Renita Duenas is a 44 y.o. female here for f/u visit for ongoing evaluation of chronic shayne knees. Pt was last seen here on 10/05/18. Pt denies interval changes on the character or distribution of pain. Pain is located shayne knees. The pain is described as stabbing, shooting, throbbing,aching and stiffness. Pain at its best is 6/10. Pain at its worse is 10/10. The pain is worsened by standing, walking, prolonged sitting, prolonged laying. Symptoms are improved by rest, avoiding painful activities, cortisone injections, elevate legs, ice packs. Pt has tried knee braces, motrin with no perceived benefit. Since last visit, patient recently found father  this morning. Pt has not had visco supplimentation injections, genicular nerve blocks.      Social History     Socioeconomic History    Marital status: SINGLE     Spouse name: Not on file    Number of children: Not on file    Years of education: Not on file    Highest education level: Not on file   Social Needs    Financial resource strain: Not on file    Food insecurity - worry: Not on file    Food insecurity - inability: Not on file    Transportation needs - medical: Not on file   CoinKeeper needs - non-medical: Not on file   Occupational History    Not on file   Tobacco Use    Smoking status: Former Smoker    Smokeless tobacco: Never Used   Substance and Sexual Activity    Alcohol use: No    Drug use: No    Sexual activity: No   Other Topics Concern    Not on file   Social History Narrative    Not on file     Family History   Problem Relation Age of Onset    Heart Attack Father     Hypertension Mother      No Known Allergies  Past Medical History:   Diagnosis Date    Arthritis     Back pain     Difficulty urinating     Dizziness     DUE TO HEADACHES    Hypertension     Joint pain     Migraine     [facility]bid [facility]     Pre-diabetes     Seizure ([facility]     Sleep apnea     CPAP    Stroke [facility]     2010     Past Surgical History:   Procedure Laterality Date    HX ACL RECONSTRUCTION      HX HERNIA REPAIR  69/4988    umbilical     HX HYSTERECTOMY  08/2016    TLH with BSO    HX KNEE ARTHROSCOPY       Current Outpatient Medications on File Prior to Visit   Medication Sig    oxyMORphone 5 mg PO ER tablet Take 1 Tab by mouth every twenty-four (24) hours for 30 days. Max Daily Amount: 5 mg.  tapentadol (NUCYNTA) 75 mg tablet Take 75 mg by mouth four (4) times daily as needed for Pain for up to 30 days. Max Daily Amount: 300 mg.    naloxone (NARCAN) 4 mg/actuation nasal spray Use 1 spray intranasally into 1 nostril as needed for opioid respiratory emergency only.  methocarbamol (ROBAXIN) 500 mg tablet Take 1 Tab by mouth three (3) times daily. (Patient taking differently: Take 500 mg by mouth three (3) times daily as needed.)    NIFEdipine ER (ADALAT CC) 30 mg ER tablet Take  by mouth daily.  gabapentin (NEURONTIN) 300 mg capsule Take 300 mg by mouth three (3) times daily as needed. No current facility-administered medications on file prior to visit. ROS:  Denies fever, chills, nausea, vomiting,constipation, abdominal pain, chest pain, shortness or breath/trouble breathing, trouble swallowing, changes in vision, changes in hearing,bladder incontinence, bowel incontinence,suicidal ideations, homicidal ideations or alcohol use. Positive findings include weakness chronic not new, falls, dizziness chronic not new, depression chronic no new, anxiety chronic no new    Opioid specific risk: Sleep apnea, obesity. Opioid specific history: Concurrent opioid use since approximately since 2013 with no opioid holiday.     Vitals:    01/04/19 1115 01/04/19 1126   BP: (!) 154/112 (!) 152/107   Pulse: 92    Resp: 14    Temp: 97.2 °F (36.2 °C)    TempSrc: Oral    SpO2: 99%    Weight: 121.6 kg (268 lb)    Height: 5' 9\" (1.753 m)    PainSc:   7    PainLoc: Knee    LMP: 07/23/2016        Imaging:  \"\"\"\" 6/20/16 x-ray knee limited right  IMPRESSION:  1.  No evidence for fracture or dislocation. 2.  Mild degenerative changes. \"\"\"  \"\"\"\" 6/20/16 x-ray knee limited left  IMPRESSION:  1.  No evidence for fracture or dislocation. 2.  Mild degenerative changes. \"\"\"    Labs:   BUN/Cr: \"\"\"\" 9/22/18   21/0.8\"\"\"  AST/ALT: \"\"\"\" 9/22/18 20/22\"\"\"\"      Physical exam:  AFVS elevated BP, no acute distress, obese body habitus. A&OXs 3. Normocephalic, atraumatic. Conjugate gaze, clear sclerae. Speech is clear and appropriate. Mood is appropriate and patient is cooperative. Gait and balance are within functional limits. Non-labored breathing. No acute distress noted. LE:   Strength for right lower extremity is 5/5 for hip flexion, knee extension, dorsiflexion, extensor hallucis longus, plantar flexion. Strength for left lower extremity is 5/5 for hip flexion, knee extension, dorsiflexion, extensor hallucis longus, plantar flexion. Garrett knee crepitus noted with flexion/extension  Slight tissue edema noted on lateral side of Garrett knees  Garrett knees joint line, left pedis anserine bursa TTP  Garrett knee no erythema, warmth noted    Calculated MEQ - 135  Naloxone rescue - yes  Prophylactic bowel program - no  Date of last OCA 8/7/18  Last UDS today, not consistent POC, awaiting confirmatory testing   date checked today, findings consistent    Primary Care Physician  [place], [facility] [facility] [address] [facility]  [phone]    Today   Last Visit  Prior Visit  ORT -        PGIC -2 and 8  not completed       AL -68%  not completed  80%     COMM -11   not completed    26       PHQ -- . PHQ over the last two weeks 1/4/2019   PHQ Not Done -   Little interest or pleasure in doing things Several days   Feeling down, depressed, irritable, or hopeless Several days   Total Score PHQ 2 2       DSM V-OUD Screen -deferred    Assessment/Plan:     ICD-10-CM ICD-9-CM    1.  Chronic pain of both knees M25.561 719.46 tapentadol (NUCYNTA) 75 mg tablet    M25.562 338.29 tapentadol (NUCYNTA) 75 mg tablet    G89.29  tapentadol (NUCYNTA) 75 mg tablet      tapentadol (NUCYNTA) 75 mg tablet   2. Encounter for long-term (current) use of high-risk medication Z79.899 V58.69 DRUG SCREEN      AMB POC DRUG SCREEN ()   3. Chronic pain syndrome G89.4 338.4 tapentadol (NUCYNTA) 75 mg tablet      tapentadol (NUCYNTA) 75 mg tablet      tapentadol (NUCYNTA) 75 mg tablet      tapentadol (NUCYNTA) 75 mg tablet   4. Tricompartment osteoarthritis of both knees M17.0 715.96         UDS today    Patient to recheck blood pressure in 2-3 weeks and if still elevated to follow-up with PCP    Ordered compound cream to be used 3-4 times daily as needed ([place])    Ordered nexwave TENS unit to be used as needed    Pt to take OTC Tylenol 500 mg 1-2 tabs up to TID PRN pain. Max 3000 mg daily dose. Refer to Dr Elyssa Casillas for visco supplementation for shayne knees      Patient taking Robaxin 500 mg tablet up to 3 times daily patient has refills. Do not recommend long term opioid therapy for this patient at this time. Pt currently taking oxymorphone ER 5 mg tablet 1 tablet daily for chronic pain and Nucynta 75 mg tablet 1 tablet 4 times daily as needed. Their MME is 135. Today, we will continue the weaning of patients opioid medication with a goal of being opioid free, pending safety and compliance. Pt instructed to call if they experience any signs of withdrawal (diarrhea, nausea, vomiting, sweating or chills, agitation, itching).      Patient has multiple aberrancies from opioid care agreement including 3 confirmed UDS negative for oxymorphone and Nucynta, opioids are no longer a safe option for this patient, will continue weaning as following: Prescriptions already preprinted    1/4/19 to 1/18/19 D/C oxymorphone continue Nucynta 75 mg tablet 1 tablet up to 4 times daily as needed for 2 weeks   1/18/19 to 2/1/19 Nucynta 75 mg tablet 1 tab up to 3 times daily as needed for 2 weeks  2/1/19 to 2/15/19 Nucynta 75 mg tablet 1 tablet up to twice daily as needed for 2 weeks  2/15/19 to 3/1/19 Nucynta 75 mg tablet 1 tablet up to once daily as needed for 2 weeks then DC    We will continue to address patient's pain with other options other than opioids consider genicular nerve block, Visco supplementation, diagnostic/therapeutic left pes anserine bursa injection, physical therapy. Follow up ongoing assessment and ongoing development of integrative and comprehensive plan of care for chronic pain. Goals: To establish complementary and integrative plan of care to address chronic pain issues while minimizing pharmaceuticals to maximize patient's function improve quality of life. Education:  Patient again educated on the importance of strict compliance with the opioid care agreement while on opioid therapy. Patient also again educated that they should avoid driving while on chronic opioid therapy. Also advised to avoid alcohol and to avoid benzodiazepines while on opioid therapy. Handouts given regarding opioid safety ans sleep health. Patient Homework:  Continue to independently investigate for persons with chronic pain. Follow-up Disposition:  Return in about 3 months (around 4/4/2019). [address] was used for portions of this report. Unintended errors may occur.

## 2019-01-04 NOTE — PATIENT INSTRUCTIONS
Pt instructed to call if they experience any signs of withdrawal (diarrhea, nausea, vomiting, sweating or chills, agitation, itching). Learning About Sleeping Well  What does sleeping well mean? Sleeping well means getting enough sleep. How much sleep is enough varies among people. The number of hours you sleep is not as important as how you feel when you wake up. If you do not feel refreshed, you probably need more sleep. Another sign of not getting enough sleep is feeling tired during the day. The average total nightly sleep time is 7½ to 8 hours. Healthy adults may need a little more or a little less than this. Why is getting enough sleep important? Getting enough quality sleep is a basic part of good health. When your sleep suffers, your mood and your thoughts can suffer too. You may find yourself feeling more grumpy or stressed. Not getting enough sleep also can lead to serious problems, including injury, accidents, anxiety, and depression. What might cause poor sleeping? Many things can cause sleep problems, including:  · Stress. Stress can be caused by fear about a single event, such as giving a speech. Or you may have ongoing stress, such as worry about work or school. · Depression, anxiety, and other mental or emotional conditions. · Changes in your sleep habits or surroundings. This includes changes that happen where you sleep, such as noise, light, or sleeping in a different bed. It also includes changes in your sleep pattern, such as having jet lag or working a late shift. · Health problems, such as pain, breathing problems, and restless legs syndrome. · Lack of regular exercise. How can you help yourself? Here are some tips that may help you sleep more soundly and wake up feeling more refreshed. Your sleeping area  · Use your bedroom only for sleeping and sex. A bit of light reading may help you fall asleep. But if it doesn't, do your reading elsewhere in the house.  Don't watch TV in bed. · Be sure your bed is big enough to stretch out comfortably, especially if you have a sleep partner. · Keep your bedroom quiet, dark, and cool. Use curtains, blinds, or a sleep mask to block out light. To block out noise, use earplugs, soothing music, or a \"white noise\" machine. Your evening and bedtime routine  · Create a relaxing bedtime routine. You might want to take a warm shower or bath, listen to soothing music, or drink a cup of noncaffeinated tea. · Go to bed at the same time every night. And get up at the same time every morning, even if you feel tired. What to avoid  · Limit caffeine (coffee, tea, caffeinated sodas) during the day, and don't have any for at least 4 to 6 hours before bedtime. · Don't drink alcohol before bedtime. Alcohol can cause you to wake up more often during the night. · Don't smoke or use tobacco, especially in the evening. Nicotine can keep you awake. · Don't take naps during the day, especially close to bedtime. · Don't lie in bed awake for too long. If you can't fall asleep, or if you wake up in the middle of the night and can't get back to sleep within 15 minutes or so, get out of bed and go to another room until you feel sleepy. · Don't take medicine right before bed that may keep you awake or make you feel hyper or energized. Your doctor can tell you if your medicine may do this and if you can take it earlier in the day. If you can't sleep  · Imagine yourself in a peaceful, pleasant scene. Focus on the details and feelings of being in a place that is relaxing. · Get up and do a quiet or boring activity until you feel sleepy. · Don't drink any liquids after 6 p.m. if you wake up often because you have to go to the bathroom. Where can you learn more? Go to http://delgado-carlota.info/. Enter H479 in the search box to learn more about \"Learning About Sleeping Well. \"  Current as of: December 7, 2017  Content Version: 11.8  © 9134-7679 Healthwise, Incorporated. Care instructions adapted under license by [employer] (which disclaims liability or warranty for this information). If you have questions about a medical condition or this instruction, always ask your healthcare professional. [address] any warranty or liability for your use of this information. Safe Use of Opioid Pain Medicine: Care Instructions  Your Care Instructions  Pain is your body's way of warning you that something is wrong. Pain feels different for everybody. Only you can describe your pain. A doctor can suggest or prescribe many types of medicines for pain. These range from over-the-counter medicines like acetaminophen (Tylenol) to powerful medicines called opioids. Examples of opioids are fentanyl, hydrocodone, morphine, and oxycodone. Heroin is an illegal opioid  Opioids are strong medicines. They can help you manage pain when you use them the right way. But if you misuse them, they can cause serious harm and even death. For these reasons, doctors are very careful about how they prescribe opioids. If you decide to take opioids, here are some things to remember. · Keep your doctor informed. You can get addicted to opioids. The risk is higher if you have a history of substance use. Your doctor will monitor you closely for signs of misuse and addiction and to figure out when you no longer need to take opioids. · Make a treatment plan. The goal of your plan is to be able to function and do the things you need to do, even if you still have some pain. You might be able to manage your pain with other non-opioid options like physical therapy, relaxation, or over-the-counter pain medicines. · Be aware of the side effects. Opioids can cause serious side effects, such as constipation, dry mouth, and nausea. And over time, you may need a higher dose to get pain relief. This is called tolerance. Your body also gets used to opioids.  This is called physical dependence. If you suddenly stop taking them, you may have withdrawal symptoms. The doctor carefully considered what pain medicine is right for you. You may not have received opioids if your doctor was concerned about drug interactions or your safety, or if he or she had other concerns. It is best to have one doctor or clinic treat your pain. This way you will get the pain medicine that will help you the most. And a doctor will be able to watch for any problems that the medicine might cause. The doctor has checked you carefully, but problems can develop later. If you notice any problems or new symptoms,  get medical treatment right away. Follow-up care is a key part of your treatment and safety. Be sure to make and go to all appointments, and call your doctor if you are having problems. It's also a good idea to know your test results and keep a list of the medicines you take. How can you care for yourself at home? · If you need to take opioids to manage your pain, remember these safety tips. ? Follow directions carefully. It's easy to misuse opioids if you take a dose other than what's prescribed by your doctor. This can lead to overdose and even death. Even sharing them with someone they weren't meant for is misuse. ? Be cautious. Opioids may affect your judgment and decision making. Do not drive or operate machinery until you can think clearly. Talk with your doctor about when it is safe to drive. ? Reduce the risk of drug interactions. Opioids can be dangerous if you take them with alcohol or with certain drugs like sleeping pills and muscle relaxers. Make sure your doctor knows about all the other medicines you take, including over-the-counter medicines. Don't start any new medicines before you talk to your doctor or pharmacist.  ? Keep others safe. Store opioids in a safe and secure place. Make sure that pets, children, friends, and family can't get to them.  When you're done using opioids, make sure to properly dispose of them. You can either use a community drug take-back program or your drugstore's mail-back program. If one of these programs isn't available, you can flush opioid skin patches and unused opioid pills down the toilet. ? Reduce the risk of overdose. Misuse of opioids can be very dangerous. Protect yourself by asking your doctor about a naloxone rescue kit. It can help you--and even save your life--if you take too much of an opioid. · Try other ways to reduce pain. ? Relax, and reduce stress. Relaxation techniques such as deep breathing or meditation can help. ? Keep moving. Gentle, daily exercise can help reduce pain over the long run. Try low- or no-impact exercises such as walking, swimming, and stationary biking. Do stretches to stay flexible. ? Try heat, cold packs, and massage. ? Get enough sleep. Pain can make you tired and drain your energy. Talk with your doctor if you have trouble sleeping because of pain. ? Think positive. Your thoughts can affect your pain level. Do things that you enjoy to distract yourself when you have pain instead of focusing on the pain. See a movie, read a book, listen to music, or spend time with a friend. · If you are not taking a prescription pain medicine, ask your doctor if you can take an over-the-counter medicine. When should you call for help? Call your doctor now or seek immediate medical care if:    · You have a new kind of pain.     · You have new symptoms, such as a fever or rash, along with the pain.    Watch closely for changes in your health, and be sure to contact your doctor if:    · You think you might be using too much pain medicine, and you need help to use less or stop.     · Your pain gets worse.     · You would like a referral to a doctor or clinic that specializes in pain management. Where can you learn more? Go to http://delgado-carlota.info/.   Enter R108 in the search box to learn more about \"Safe Use of Opioid Pain Medicine: Care Instructions. \"  Current as of: September 10, 2017  Content Version: 11.8  © 4076-5212 Healthwise, Incorporated. Care instructions adapted under license by [employer] (which disclaims liability or warranty for this information). If you have questions about a medical condition or this instruction, always ask your healthcare professional. [address] any warranty or liability for your use of this information.

## 2019-01-11 DIAGNOSIS — M25.561 CHRONIC PAIN OF BOTH KNEES: ICD-10-CM

## 2019-01-11 DIAGNOSIS — G89.29 CHRONIC PAIN OF BOTH KNEES: ICD-10-CM

## 2019-01-11 DIAGNOSIS — G89.4 CHRONIC PAIN SYNDROME: ICD-10-CM

## 2019-01-11 DIAGNOSIS — M25.562 CHRONIC PAIN OF BOTH KNEES: ICD-10-CM

## 2019-01-11 NOTE — TELEPHONE ENCOUNTER
Attempted to contact patient regarding prescription refill ; no answer noted at number given; vm left to contact [phone], patient needs follow up appt for refill.

## 2019-01-11 NOTE — TELEPHONE ENCOUNTER
Rochelle Norman has called requesting a refill of their controlled medication, oxymorphone and nucynta, for the management of knee pain. Last office visit date: 1/4/19 with Zak Briggs, next 4/4/19 with Zak Briggs  Last opioid care agreement 1/18/19  Last UDS was done 2/19/19    Date last  was pulled and reviewed : 1/11/19  Last fill date for medication was 1/4/19    Was the patient compliant when the above report was pulled? yes    Analgesia: 40%    Aberrancies: 1/4/19 pt did not bring in medication bottle to appointment    ADL's: yes    Adverse Reaction: no    Provider's last note and plan of care reviewed? yes  Request forwarded to provider for review.     Oxymorphone DC

## 2019-01-16 ENCOUNTER — TELEPHONE (OUTPATIENT)
Dept: PAIN MANAGEMENT | Age: 44
End: 2019-01-16

## 2019-01-16 DIAGNOSIS — F11.93 OPIOID WITHDRAWAL (HCC): Primary | ICD-10-CM

## 2019-01-16 RX ORDER — CLONIDINE HYDROCHLORIDE 0.1 MG/1
0.1 TABLET ORAL 2 TIMES DAILY
Qty: 15 TAB | Refills: 0 | Status: SHIPPED | OUTPATIENT
Start: 2019-01-16 | End: 2020-01-08

## 2019-01-16 NOTE — TELEPHONE ENCOUNTER
Patient called the office today c/o of withdrawals. Patient identified using two patient identifiers (name and ). I asked the patient to describe symptoms. Patient states that they are experiencing diarrhea, \"funny feeling in legs\", and anxiety; and that it's been going on since last night. Patient's Oxymorphone 5mg tab was recently d/c'd and patient states their last dose was the 19. Patient currently on Nucynta 75 mg tab and Robaxin 500 mg tab, to which the patient says is not beneficial. I told the patient I would make a provider aware (since ADEEL Ruelas is not in the office today) and would let her know when I received a response, but in the meantime if they feel symptoms dictate to seek immediate medical attention in the ER or Urgent Care. Patient verbalized understanding. Patient last seen 19 by ADEEL Bundy.

## 2019-01-16 NOTE — TELEPHONE ENCOUNTER
The office received a call from a pharmacist, Gerry Alejandre, at [address] [place] [address]. He states that the pt is here in the pharmacy asking to have her nucynta prescription filled today. The prescription is not due to fill until 01/18/19. The ordering provider is not in the office today and will be forwarded over to a doctor to review. The pharmacy will be called back once there has been a response from the provider. Pharmacist made aware. A chart review also shows an earlier telephone encounter with the pt calling to report withdrawal symptoms.

## 2019-01-16 NOTE — TELEPHONE ENCOUNTER
Called patient back, Patient identified using two patient identifiers (name and ). I informed the patient that  sent a Rx to [facility] pharmacy for Clonidine to take BID PRN for their withdrawal symptoms. Patient stated that Rx should have been sent to [facility] pharmacy on [address] I told the patient I would call the Rx in. I then told the patient they need to make an appointment for next available with ADEEL Lay, and if symptoms worsen or become intolerable to seek immediate medical attention. Patient verbalized understanding and has no further questions at this time. I then called [facility] and spoke to Lissethpharmacist. Rx for Clonidine was RBV'd by them.

## 2019-01-16 NOTE — TELEPHONE ENCOUNTER
02:18 pm The patient called and stated that she will like her Rx  Nucynta to be refilled today (earlier)  because she will leaving out of town to a .   I offered to inquire with the nurse and to call her back later. I saw that the patient had called earlier in the morning and spoke to nurse Kaitlynn Bautista.

## 2019-01-16 NOTE — TELEPHONE ENCOUNTER
Pt needs first available with Floridalma. Will send Rx for Clonidine to her pharmacy in attempt to ameliorate some of her symptoms. Pt otherwise may need to seek ED care if deemed necessary.

## 2019-01-16 NOTE — TELEPHONE ENCOUNTER
I called and spoke to Ghanshyam Montesinos, the pharmacist[facility]. He was informed that the provider Dr. Neha Watkins is not authorizing the pt to get her prescription filled today. He states that the pt will not be able to fill her prescription until her fill date on 01/18/19. Withdrawal regimen was already sent over to her pharmacy on file. Other nurse handled this call.

## 2019-01-28 DIAGNOSIS — M25.562 CHRONIC PAIN OF BOTH KNEES: ICD-10-CM

## 2019-01-28 DIAGNOSIS — G89.29 CHRONIC PAIN OF BOTH KNEES: ICD-10-CM

## 2019-01-28 DIAGNOSIS — G89.4 CHRONIC PAIN SYNDROME: ICD-10-CM

## 2019-01-28 DIAGNOSIS — M25.561 CHRONIC PAIN OF BOTH KNEES: ICD-10-CM

## 2019-01-28 NOTE — TELEPHONE ENCOUNTER
Pt called in for a prescription refill on 1/28/19 current medication runs out on 1/31/18. Please give pt a call when the prescription is ready for .       Medication:Nucynta  Relief:80%  Daily Task:Yes  Side Effects:No    Last appt: 1/4/2019  Future Appointments   Date Time Provider Shiela Allison   4/4/2019  9:30 AM Alba Lopez NP [facility]

## 2019-01-28 NOTE — TELEPHONE ENCOUNTER
Patient called the office today, and the phone call was then transferred to my line. Patient identified using two patient identifiers (name and ). Patient states they are still going through withdrawals. Patient c/o pain, night sweats, diarrhea, anxiety, and \"funny feeling\" in extremities. Patient previously c/o of these symptoms on 19 to which  prescribed Clonidine to assist with these symptoms. Patient states it didn't help at all and is having a hard time right now. I asked the patient have they taken anything else for these symptoms and patient states Extra Strength Tylenol and Excedrin, which didn't help. Patient denies seeking care at the ER d/t transportation restrictions, and then states she was has having so much discomfort that they couldn't go to [employer], to which they attend regularly. I told the patient I would contact the provider for advisement, and call them back when I received a response, but in the meantime if symptoms dictate, I advised patient to seek immediate medical attention in the ER or Urgent Care. Patient verbalized understanding. Patient was last seen by ADEEL Ruelas on 19.

## 2019-01-28 NOTE — TELEPHONE ENCOUNTER
De Hernández has called requesting a refill of their controlled medication, nucynta, for the management of knee pain. Last office visit date: 12/5/18 with Tyrese Mott, next 4/4/19 with Tyrese Mott  Last opioid care agreement 8/7/18  Last UDS was done 12/8/18     Date last  was pulled and reviewed : 1/28/19  Last fill date for medication was 1/18/19    Was the patient compliant when the above report was pulled? yes    Analgesia: 80%    Aberrancies:1/4/19 pt did not bring in medication bottle to appointment. ADL's: yes    Adverse Reaction: no    Provider's last note and plan of care reviewed? yes  Request forwarded to provider for review. Pre printed prescription for 2/1/19 to 2/15/19.

## 2019-01-29 NOTE — TELEPHONE ENCOUNTER
I called patient back today. Patient identified using two patient identifiers (name and ). I informed the patient what provider, ADEEL Ruelas stated. Patient states she's been taking her Nucynta as prescribed and has enough to last her until Friday, but feels it's not helping her at all. I then told the patient that the provider would be willing to prescribe Atarax and Clonidine (although patient states Clonidine it was previously non-beneficial) to help with the withdrawal symptoms they state they have been experiencing. Patient states she is willing to try the Clonidine again with Atarax. I then told the patient that unfortunately ADEEL Ruelas is out of the office today, but will once again seek advisement from [place] [id number]; and would contact them as soon as I heard back. Patient verbalized understanding.

## 2019-01-29 NOTE — TELEPHONE ENCOUNTER
I attempted to call the patient back, but patient was sleeping, but spoke to patient's mother (who is on EC list,[facility] and was given verbal permission to speak to her earlier today). Patient identified using two patient identifiers (name and ). I informed patient's mother that I haven't heard back from the provider, but will follow up with them first thing tomorrow morning, and call them back when I heard back. I also advised if symptoms become too intolerable to seek immediate medical attention. Patient's mother verbalized understanding and stated she will inform patient when they wake up.

## 2019-01-30 ENCOUNTER — TELEPHONE (OUTPATIENT)
Dept: PAIN MANAGEMENT | Age: 44
End: 2019-01-30

## 2019-01-30 DIAGNOSIS — F11.93 OPIOID WITHDRAWAL (HCC): Primary | ICD-10-CM

## 2019-01-30 RX ORDER — CLONIDINE HYDROCHLORIDE 0.1 MG/1
0.1 TABLET ORAL
Qty: 4 TAB | Refills: 0 | Status: SHIPPED | OUTPATIENT
Start: 2019-01-30 | End: 2019-02-01

## 2019-01-30 RX ORDER — HYDROXYZINE 25 MG/1
25 TABLET, FILM COATED ORAL
Qty: 6 TAB | Refills: 0 | Status: SHIPPED | OUTPATIENT
Start: 2019-01-30 | End: 2019-02-01

## 2019-01-30 NOTE — TELEPHONE ENCOUNTER
I called the patient back today, Patient identified using two patient identifiers (name and ). I had patient verify location of pain, and patient states they were experiencing pain in their knees. I told the patient that if the pain was located in a different area, then they need to f/u with their PCP. I informed the patient that the Atarax and Clonidine was sent to their pharmacy, and emphasized that they were to be taken PRN only for the withdrawal symptoms they were describing. I then told the patient to let us know if they were interested in receiving a Toradol/Decadron injection as well. Finally I informed the patient that ADEEL Ruelas recommended they see Sandra Snow for a consult for a genicular nerve block/bursa injection. Patient states they were interested in that. I then told the patient that I would speak to 's  and have them contact them to set an appointment up. Patient verbalized understanding and has no further questions at this time.

## 2019-01-30 NOTE — TELEPHONE ENCOUNTER
Reviewed and sent to pharmacy, please ask patient about her pain, and if her pain is from her chronic knee pain which we are treating her for,  we can schedule her for a Toradol and Decadron injection x1. If this is a new pain, please have patient follow-up with PCP for complete workup.

## 2019-01-30 NOTE — TELEPHONE ENCOUNTER
Routing withdrawal cocktail to provider, RUDDY Zaldivar NP-C for Atarax and Clonidine. Patient c/o pain, night sweats, diarrhea, anxiety, and \"funny feeling\" in extremities. Patient previously c/o of these symptoms on 1/16/19.

## 2019-02-11 DIAGNOSIS — G89.29 CHRONIC PAIN OF BOTH KNEES: ICD-10-CM

## 2019-02-11 DIAGNOSIS — M25.562 CHRONIC PAIN OF BOTH KNEES: ICD-10-CM

## 2019-02-11 DIAGNOSIS — G89.4 CHRONIC PAIN SYNDROME: ICD-10-CM

## 2019-02-11 DIAGNOSIS — M25.561 CHRONIC PAIN OF BOTH KNEES: ICD-10-CM

## 2019-02-11 RX ORDER — METHOCARBAMOL 500 MG/1
500 TABLET, FILM COATED ORAL 3 TIMES DAILY
Qty: 90 TAB | Refills: 3 | Status: CANCELLED | OUTPATIENT
Start: 2019-02-11

## 2019-02-11 NOTE — TELEPHONE ENCOUNTER
Patient called about  Medication refill(Nucynta and Robaxin)    60%  Yes  No    Please give patient a call [phone]

## 2019-02-12 RX ORDER — METHOCARBAMOL 500 MG/1
500 TABLET, FILM COATED ORAL 3 TIMES DAILY
Qty: 90 TAB | Refills: 2 | Status: SHIPPED | OUTPATIENT
Start: 2019-02-12

## 2019-02-12 NOTE — TELEPHONE ENCOUNTER
Harshil Silva has called requesting a refill of their controlled medication, robaxin and nucynta, for the management of knee pain. Last office visit date: 1/4/19 with Royce To ,next 4/4/19 with Royce To  Last opioid care agreement 8/7/18  Last UDS was done 1/10/19    Date last  was pulled and reviewed : 2/12/19  Last fill date for medication was 2/1/19    Was the patient compliant when the above report was pulled? yes    Analgesia: 60%    Aberrancies: UDS 1/4/19 (-) Nucynta filled 12/5/18 and (-) Oxymorphone filled 12/12/18. See visit notes. Patient weaning off medications due to previous (-) UDS results. ADL's: yes    Adverse Reaction: no    Provider's last note and plan of care reviewed? yes  Request forwarded to provider for review.   Nucynta pre printed

## 2019-02-12 NOTE — TELEPHONE ENCOUNTER
Spoke to Keyur 7fgame who is on [employer]  Patient identified using two patient identifiers (name and ); patient advised prescriptions are ready for pick-up. Informed her of robaxin sent to [address] [place] [address].

## 2019-02-15 ENCOUNTER — TELEPHONE (OUTPATIENT)
Dept: PAIN MANAGEMENT | Age: 44
End: 2019-02-15

## 2019-02-19 ENCOUNTER — TELEPHONE (OUTPATIENT)
Dept: PAIN MANAGEMENT | Age: 44
End: 2019-02-19

## 2019-02-19 NOTE — TELEPHONE ENCOUNTER
09:13 am I LM with Salina Leroy [facility]. Patient's mother that the Rx for Tylor Canary has been approved by her insurance.

## 2019-11-07 ENCOUNTER — APPOINTMENT (OUTPATIENT)
Dept: PHYSICAL THERAPY | Age: 44
End: 2019-11-07

## 2019-11-11 ENCOUNTER — HOSPITAL ENCOUNTER (OUTPATIENT)
Dept: PHYSICAL THERAPY | Age: 44
End: 2019-11-11

## 2020-01-08 ENCOUNTER — OFFICE VISIT (OUTPATIENT)
Dept: ORTHOPEDIC SURGERY | Age: 45
End: 2020-01-08

## 2020-01-08 VITALS
HEIGHT: 69 IN | WEIGHT: 293 LBS | HEART RATE: 98 BPM | OXYGEN SATURATION: 100 % | SYSTOLIC BLOOD PRESSURE: 160 MMHG | BODY MASS INDEX: 43.4 KG/M2 | DIASTOLIC BLOOD PRESSURE: 115 MMHG

## 2020-01-08 DIAGNOSIS — M53.3 SCLEROSIS OF SACROILIAC JOINT: Primary | ICD-10-CM

## 2020-01-08 RX ORDER — RISPERIDONE 1 MG/1
TABLET, FILM COATED ORAL DAILY
COMMUNITY

## 2020-01-08 RX ORDER — METFORMIN HYDROCHLORIDE 500 MG/1
TABLET ORAL 2 TIMES DAILY WITH MEALS
COMMUNITY

## 2020-01-08 RX ORDER — ZONISAMIDE 100 MG/1
CAPSULE ORAL DAILY
COMMUNITY

## 2020-01-08 RX ORDER — LORAZEPAM 2 MG/1
TABLET ORAL
COMMUNITY

## 2020-01-08 RX ORDER — BUPIVACAINE HYDROCHLORIDE 2.5 MG/ML
0.5 INJECTION, SOLUTION INFILTRATION; PERINEURAL ONCE
Qty: 0.5 ML | Refills: 0
Start: 2020-01-08 | End: 2020-01-08

## 2020-01-08 RX ORDER — PRAZOSIN HYDROCHLORIDE 5 MG/1
CAPSULE ORAL
COMMUNITY

## 2020-01-08 RX ORDER — LIDOCAINE HYDROCHLORIDE 20 MG/ML
0.5 INJECTION, SOLUTION EPIDURAL; INFILTRATION; INTRACAUDAL; PERINEURAL ONCE
Qty: 0.5 ML | Refills: 0
Start: 2020-01-08 | End: 2020-01-08

## 2020-01-08 RX ORDER — DIVALPROEX SODIUM 250 MG/1
TABLET, EXTENDED RELEASE ORAL
COMMUNITY

## 2020-01-08 RX ORDER — BETAMETHASONE SODIUM PHOSPHATE AND BETAMETHASONE ACETATE 3; 3 MG/ML; MG/ML
12 INJECTION, SUSPENSION INTRA-ARTICULAR; INTRALESIONAL; INTRAMUSCULAR; SOFT TISSUE ONCE
Qty: 2 ML | Refills: 0
Start: 2020-01-08 | End: 2020-01-08

## 2020-01-08 RX ORDER — TEMAZEPAM 15 MG/1
CAPSULE ORAL
COMMUNITY

## 2020-01-08 RX ORDER — PREGABALIN 75 MG/1
CAPSULE ORAL
COMMUNITY

## 2020-01-08 NOTE — PROGRESS NOTES
[address]  [address], [address]  [place]  Phone: [phone]  Fax: [phone]  : 1975  PCP: Za Harper MD      NEW PATIENT      ASSESSMENT AND PLAN     Diagnoses and all orders for this visit:    1. Sclerosis of sacroiliac joint  -     LIDOCAINE INJECTION  -     lidocaine, PF, (XYLOCAINE) 20 mg/mL (2 %) injection; 0.5 mL by Other route once for 1 dose. -     bupivacaine (MARCAINE) 0.25 % (2.5 mg/mL) soln injection; 0.5 mL by IntraMUSCular route once for 1 dose. -     INJECTION, BUPIVICAINE HYDRO  -     betamethasone (CELESTONE SOLUSPAN) 6 mg/mL injection; 2 mL by IntraMUSCular route once for 1 dose.  -     BETAMETHASONE ACETATE & SODIUM PHOSPHATE INJECTION 3 MG EA.  -     RI INJECT TRIGGER POINT, 1 OR 2       1. Advised to stay active as tolerated. 2. L iliolumbar TPI  3. Given information on SI joint pain and exercises    F/U Pt may call and be scheduled for L SI joint injection      CHIEF COMPLAINT  Jannis Eisenmenger is seen today in consultation at the request of [facility] for complaints of back and leg pain. HISTORY OF PRESENT ILLNESS  Jannis Eisenmenger is a 38 y.o. female. Today pt c/o back and leg pain of 6 day duration. Pt denies any specific incident or injury that caused their pain. Pt reports intense pain in her L low back and states it radiates up some. She reports receiving Toradol injection with no benefit. She states she sat on the bed one day and her pain began. She notes sometimes this happens and aggravates her muscles, but this relieves with muscle relaxers. This time it did not. She denies fevers. Pt reports some pain into her L thigh. She denies having similar pain before. Denies having cortisone injections previously. Denies hx of RA, lupus.  Lumbar xrays +SIJ sclerosis    Location of pain: L low back  Does pain radiate into extremities: L thigh  Does patient have weakness: no   Pt denies saddle paresthesias. Medications pt is on: Lidoderm patches PRN. Robaxin PRN no benfeit. Valium no benefit. Flexeril PRN no benefit. Skelaxin PRN. Denies persistent fevers, chills, weight changes, neurogenic bowel or bladder symptoms. Pt denies any recent fevers, chills, antibiotics, recent cortisone injections, or infections. Treatments patient has tried:  Physical therapy:No  Doing HEP: Unknown  Non-opioid medications: Yes Failed Gabapentin  Spinal injections: No  Spinal surgery- No.   Last L XR 2020: chronic trace listhesis L4-5, sclerotic changes SI joints. L MRI 2016 mod stenosis L3/4, mild L4/5     reviewed. PMHx of chronic pain, HTN, pre-DM, CVA 2010, seizure disorder - Dr. Ren Gentile. Been at [facility], was on Nucynta until 2/2019. She notes she chose to wean off PM. Multiple ED visits 11/2019  for anxiety, migraine, and 1/2020 for LLE pain. Pain Assessment  1/8/2020   Location of Pain Back; Hip   Location Modifiers Left   Severity of Pain 10   Quality of Pain Aching   Duration of Pain Persistent   Frequency of Pain Constant   Aggravating Factors Other (Comment)   Aggravating Factors Comment All movements   Limiting Behavior Yes   Relieving Factors Rest;NSAID   Result of Injury -         PAST MEDICAL HISTORY   Past Medical History:   Diagnosis Date    Arthritis     Back pain     Difficulty urinating     Dizziness     DUE TO HEADACHES    Hypertension     Joint pain     Migraine     Morbid obesity ([address]     Pre-diabetes     [facility]izure [facility] [address]     Sleep apnea     CPAP    Stroke [facility])     2010       Past Surgical History:   Procedure Laterality Date    HX ACL RECONSTRUCTION      HX HERNIA REPAIR  33/6722    umbilical     HX HYSTERECTOMY  08/2016    TLH with BSO    HX KNEE ARTHROSCOPY Left        MEDICATIONS      Current Outpatient Medications   Medication Sig Dispense Refill    azilsartan med-chlorthalidone (EDARBYCLOR) 40-12.5 mg tab Take  by mouth.       divalproex ER (DEPAKOTE ER) 250 mg ER tablet Take  by mouth.  pregabalin (LYRICA) 75 mg capsule Take  by mouth.  zonisamide (ZONEGRAN) 100 mg capsule Take  by mouth daily.  prazosin (MINIPRESS) 5 mg capsule Take  by mouth nightly.  LORazepam (ATIVAN) 2 mg tablet Take  by mouth every six (6) hours as needed for Anxiety.  risperiDONE (RISPERDAL) 1 mg tablet Take  by mouth daily.  metFORMIN (GLUCOPHAGE) 500 mg tablet Take  by mouth two (2) times daily (with meals).  temazepam (RESTORIL) 15 mg capsule Take  by mouth nightly as needed for Sleep.  methocarbamol (ROBAXIN) 500 mg tablet Take 1 Tab by mouth three (3) times daily. 90 Tab 2    NIFEdipine ER (ADALAT CC) 30 mg ER tablet Take  by mouth daily.  naloxone (NARCAN) 4 mg/actuation nasal spray Use 1 spray intranasally into 1 nostril as needed for opioid respiratory emergency only.  2 Each 0       ALLERGIES  No Known Allergies       SOCIAL HISTORY    Social History     Socioeconomic History    Marital status: SINGLE     Spouse name: Not on file    Number of children: Not on file    Years of education: Not on file    Highest education level: Not on file   Occupational History    Not on file   Social Needs    Financial resource strain: Not on file    Food insecurity:     Worry: Not on file     Inability: Not on file    Transportation needs:     Medical: Not on file     Non-medical: Not on file   Tobacco Use    Smoking status: Former Smoker    Smokeless tobacco: Never Used   Substance and Sexual Activity    Alcohol use: No    Drug use: No    Sexual activity: Never   Lifestyle    Physical activity:     Days per week: Not on file     Minutes per session: Not on file    Stress: Not on file   Relationships    Social connections:     Talks on phone: Not on file     Gets together: Not on file     Attends Voodoo service: Not on file     Active member of club or organization: Not on file     Attends meetings of clubs or organizations: Not on file     Relationship status: Not on file    Intimate partner violence:     Fear of current or ex partner: Not on file     Emotionally abused: Not on file     Physically abused: Not on file     Forced sexual activity: Not on file   Other Topics Concern    Not on file   Social History Narrative    Not on file       FAMILY HISTORY    Family History   Problem Relation Age of Onset    Heart Attack Father     Hypertension Mother          REVIEW OF SYSTEMS  Review of Systems   Constitutional: Negative for chills, fever and weight loss. Respiratory: Negative for shortness of breath. Cardiovascular: Negative for chest pain. Gastrointestinal: Negative for constipation. Negative for fecal incontinence   Genitourinary: Negative for dysuria. Negative for urinary incontinence   Musculoskeletal: Positive for joint pain. Per HPI   Skin: Negative for rash. Neurological: Negative for dizziness, tingling, tremors, focal weakness and headaches. Endo/Heme/Allergies: Does not bruise/bleed easily. Psychiatric/Behavioral: The patient does not have insomnia. PHYSICAL EXAMINATION  Visit Vitals  BP (!) 160/115 (BP 1 Location: Left arm, BP Patient Position: Sitting)   Pulse 98   Ht 5' 9\" (1.753 m)   Wt 318 lb (144.2 kg)   LMP 07/23/2016   SpO2 100%   BMI 46.96 kg/m²          Accompanied by family member. Constitutional:  Well developed, well nourished, in mild distress. Psychiatric: Affect and mood are appropriate. Integumentary: No rashes or abrasions noted on exposed areas. Cardiovascular/Peripheral Vascular: No peripheral edema is noted BLE. SPINE/MUSCULOSKELETAL EXAM      Lumbar spine:  No rash, ecchymosis, or gross obliquity. No fasciculations. No focal atrophy is noted. Tenderness to palpation L SI joint. No tenderness to palpation at the sciatic notch. Trochanters non tender.        MOTOR:       Hip Flex  Quads Hamstrings Ankle DF EHL Ankle PF   Right +4/5 +4/5 +4/5 +4/5 +4/5 +4/5   Left +4/5 +4/5 +4/5 +4/5 +4/5 +4/5     Straight Leg raise negative. Positive L DANILO maneuver. Ambulation without assistive device. FWB. [facility] ONE  OFFICE PROCEDURE PROGRESS NOTE      PROCEDURE: In the office today after informed consent using aseptic technique, the patient was injected with a total of 2 cc of Celestone, 0.5 cc each of Lidocaine and Marcaine into her left iliolumbar trigger point. Chart reviewed for the following:   I, Dr. Paddy Diana, have reviewed the History, Physical and updated the Allergic reactions for [facility], [place], operated by [facility]. Local measures (ice/heat) and medications have not alleviated the symptoms. TIME OUT performed immediately prior to start of procedure:   I, Dr. Paddy Diana, have performed the following reviews on [facility] [place] prior to the start of the procedure:            * Patient was identified by name and date of birth   * Agreement on procedure being performed was verified  * Risks and Benefits explained to the patient  * Procedure site verified and marked as necessary  * Patient was positioned for comfort  * Consent was signed and verified     Time: 10:57 AM     Date of procedure: 1/9/2020    Procedure performed by:  Floyd Bonds MD    Provider assisted by: None    Patient assisted by: Self    How tolerated by patient: Pt tolerated the procedure well with no complications. Written by Lucinda Musa, as dictated by Paddy Diana MD.    I, Dr. Paddy Diana MD, confirm that all documentation is accurate. Ms. Adrianna Valdovinos may have a reminder for a \"due or due soon\" health maintenance. I have asked that she contact her primary care provider for follow-up on this health maintenance.

## 2020-01-08 NOTE — PATIENT INSTRUCTIONS
Sacroiliac Pain: Exercises  Introduction  Here are some examples of exercises for you to try. The exercises may be suggested for a condition or for rehabilitation. Start each exercise slowly. Ease off the exercises if you start to have pain. You will be told when to start these exercises and which ones will work best for you. How to do the exercises  Knee-to-chest stretch    1. Do not do the knee-to-chest exercise if it causes or increases back or leg pain. 2. Lie on your back with your knees bent and your feet flat on the floor. You can put a small pillow under your head and neck if it is more comfortable. 3. Grasp your hands under one knee and bring the knee to your chest, keeping the other foot flat on the floor. 4. Keep your lower back pressed to the floor. Hold for at least 15 to 30 seconds. 5. Relax and lower the knee to the starting position. Repeat with the other leg. 6. Repeat 2 to 4 times with each leg. 7. To get more stretch, keep your other leg flat on the floor while pulling your knee to your chest.    Bridging    1. Lie on your back with both knees bent. Your knees should be bent about 90 degrees. 2. Tighten your belly muscles by pulling in your belly button toward your spine. Then push your feet into the floor, squeeze your buttocks, and lift your hips off the floor until your shoulders, hips, and knees are all in a straight line. 3. Hold for about 6 seconds as you continue to breathe normally, and then slowly lower your hips back down to the floor and rest for up to 10 seconds. 4. Repeat 8 to 12 times. Hip extension    1. Get down on your hands and knees on the floor. 2. Keeping your back and neck straight, lift one leg straight out behind you. When you lift your leg, keep your hips level. Don't let your back twist, and don't let your hip drop toward the floor. 3. Hold for 6 seconds. Repeat 8 to 12 times with each leg.   4. If you feel steady and strong when you do this exercise, you can make it more difficult. To do this, when you lift your leg, also lift the opposite arm straight out in front of you. For example, lift the left leg and the right arm at the same time. (This is sometimes called the \"bird dog exercise. \") Hold for 6 seconds, and repeat 8 to 12 times on each side. Clamshell    1. Lie on your side with a pillow under your head. Keep your feet and knees together and your knees bent. 2. Raise your top knee, but keep your feet together. Do not let your hips roll back. Your legs should open up like a clamshell. 3. Hold for 6 seconds. 4. Slowly lower your knee back down. Rest for 10 seconds. 5. Repeat 8 to 12 times. 6. Switch to your other side and repeat steps 1 through 5. Hamstring wall stretch    1. Lie on your back in a doorway, with one leg through the open door. 2. Slide your affected leg up the wall to straighten your knee. You should feel a gentle stretch down the back of your leg. 1. Do not arch your back. 2. Do not bend either knee. 3. Keep one heel touching the floor and the other heel touching the wall. Do not point your toes. 3. Hold the stretch for at least 1 minute to begin. Then try to lengthen the time you hold the stretch to as long as 6 minutes. 4. Switch legs, and repeat steps 1 through 3.  5. Repeat 2 to 4 times. 6. If you do not have a place to do this exercise in a doorway, there is another way to do it:  7. Lie on your back, and bend one knee. 8. Loop a towel under the ball and toes of that foot, and hold the ends of the towel in your hands. 9. Straighten your knee, and slowly pull back on the towel. You should feel a gentle stretch down the back of your leg. 10. Switch legs, and repeat steps 1 through 3.  11. Repeat 2 to 4 times. Lower abdominal strengthening    1. Lie on your back with your knees bent and your feet flat on the floor. 2. Tighten your belly muscles by pulling your belly button in toward your spine.   3. Lift one foot off the floor and bring your knee toward your chest, so that your knee is straight above your hip and your leg is bent like the letter \"L. \"  4. Lift the other knee up to the same position. 5. Lower one leg at a time to the starting position. 6. Keep alternating legs until you have lifted each leg 8 to 12 times. 7. Be sure to keep your belly muscles tight and your back still as you are moving your legs. Be sure to breathe normally. Piriformis stretch    1. Lie on your back with your legs straight. 2. Lift your affected leg, and bend your knee. With your opposite hand, reach across your body, and then gently pull your knee toward your opposite shoulder. 3. Hold the stretch for 15 to 30 seconds. 4. Switch legs and repeat steps 1 through 3.  5. Repeat 2 to 4 times. Follow-up care is a key part of your treatment and safety. Be sure to make and go to all appointments, and call your doctor if you are having problems. It's also a good idea to know your test results and keep a list of the medicines you take. Where can you learn more? Go to http://delgado-carlota.info/. Enter K369 in the search box to learn more about \"Sacroiliac Pain: Exercises. \"  Current as of: June 26, 2019  Content Version: 12.2  © 9175-7055 [employer]. Care instructions adapted under license by [employer] (which disclaims liability or warranty for this information). If you have questions about a medical condition or this instruction, always ask your healthcare professional. [address] any warranty or liability for your use of this information. Sacroiliac Pain: Exercises  Introduction  Here are some examples of exercises for you to try. The exercises may be suggested for a condition or for rehabilitation. Start each exercise slowly. Ease off the exercises if you start to have pain. You will be told when to start these exercises and which ones will work best for you.   How to do the exercises  Knee-to-chest stretch    8. Do not do the knee-to-chest exercise if it causes or increases back or leg pain. 9. Lie on your back with your knees bent and your feet flat on the floor. You can put a small pillow under your head and neck if it is more comfortable. 10. Grasp your hands under one knee and bring the knee to your chest, keeping the other foot flat on the floor. 11. Keep your lower back pressed to the floor. Hold for at least 15 to 30 seconds. 12. Relax and lower the knee to the starting position. Repeat with the other leg. 13. Repeat 2 to 4 times with each leg. 14. To get more stretch, keep your other leg flat on the floor while pulling your knee to your chest.    Bridging    5. Lie on your back with both knees bent. Your knees should be bent about 90 degrees. 6. Tighten your belly muscles by pulling in your belly button toward your spine. Then push your feet into the floor, squeeze your buttocks, and lift your hips off the floor until your shoulders, hips, and knees are all in a straight line. 7. Hold for about 6 seconds as you continue to breathe normally, and then slowly lower your hips back down to the floor and rest for up to 10 seconds. 8. Repeat 8 to 12 times. Hip extension    5. Get down on your hands and knees on the floor. 6. Keeping your back and neck straight, lift one leg straight out behind you. When you lift your leg, keep your hips level. Don't let your back twist, and don't let your hip drop toward the floor. 7. Hold for 6 seconds. Repeat 8 to 12 times with each leg. 8. If you feel steady and strong when you do this exercise, you can make it more difficult. To do this, when you lift your leg, also lift the opposite arm straight out in front of you. For example, lift the left leg and the right arm at the same time. (This is sometimes called the \"bird dog exercise. \") Hold for 6 seconds, and repeat 8 to 12 times on each side. Meghanl    7.  Lie on your side with a pillow under your head. Keep your feet and knees together and your knees bent. 8. Raise your top knee, but keep your feet together. Do not let your hips roll back. Your legs should open up like a clamshell. 9. Hold for 6 seconds. 10. Slowly lower your knee back down. Rest for 10 seconds. 11. Repeat 8 to 12 times. 12. Switch to your other side and repeat steps 1 through 5. Hamstring wall stretch    12. Lie on your back in a doorway, with one leg through the open door. 13. Slide your affected leg up the wall to straighten your knee. You should feel a gentle stretch down the back of your leg. 1. Do not arch your back. 2. Do not bend either knee. 3. Keep one heel touching the floor and the other heel touching the wall. Do not point your toes. 14. Hold the stretch for at least 1 minute to begin. Then try to lengthen the time you hold the stretch to as long as 6 minutes. 15. Switch legs, and repeat steps 1 through 3.  16. Repeat 2 to 4 times. 17. If you do not have a place to do this exercise in a doorway, there is another way to do it:  18. Lie on your back, and bend one knee. 19. Loop a towel under the ball and toes of that foot, and hold the ends of the towel in your hands. 20. Straighten your knee, and slowly pull back on the towel. You should feel a gentle stretch down the back of your leg. 21. Switch legs, and repeat steps 1 through 3.  22. Repeat 2 to 4 times. Lower abdominal strengthening    8. Lie on your back with your knees bent and your feet flat on the floor. 9. Tighten your belly muscles by pulling your belly button in toward your spine. 10. Lift one foot off the floor and bring your knee toward your chest, so that your knee is straight above your hip and your leg is bent like the letter \"L. \"  11. Lift the other knee up to the same position. 12. Lower one leg at a time to the starting position.   13. Keep alternating legs until you have lifted each leg 8 to 12 times.  14. Be sure to keep your belly muscles tight and your back still as you are moving your legs. Be sure to breathe normally. Piriformis stretch    6. Lie on your back with your legs straight. 7. Lift your affected leg, and bend your knee. With your opposite hand, reach across your body, and then gently pull your knee toward your opposite shoulder. 8. Hold the stretch for 15 to 30 seconds. 9. Switch legs and repeat steps 1 through 3.  10. Repeat 2 to 4 times. Follow-up care is a key part of your treatment and safety. Be sure to make and go to all appointments, and call your doctor if you are having problems. It's also a good idea to know your test results and keep a list of the medicines you take. Where can you learn more? Go to http://delgado-carlota.info/. Enter P497 in the search box to learn more about \"Sacroiliac Pain: Exercises. \"  Current as of: June 26, 2019  Content Version: 12.2  © 5161-5889 [employer]. Care instructions adapted under license by [employer] (which disclaims liability or warranty for this information). If you have questions about a medical condition or this instruction, always ask your healthcare professional. [place] [address] any warranty or liability for your use of this information. Sacroiliac Joint Pain: Care Instructions  Your Care Instructions    The sacroiliac joints connect the spine and each side of the pelvis. These joints bear the weight and stress of your torso. This makes them easy to injure. Injury or overuse of these joints may cause low back pain. Stress on these joints can cause joint pain. Sacroiliac joint pain is more common in pregnant women. Certain kinds of arthritis also may cause this type of joint pain. Home treatment may help you feel better. So can avoiding activities that stress your back. Your doctor also may recommend physical therapy.  This may include doing exercises and stretches to help with pain. You may also learn to use good posture. Follow-up care is a key part of your treatment and safety. Be sure to make and go to all appointments, and call your doctor if you are having problems. It's also a good idea to know your test results and keep a list of the medicines you take. How can you care for yourself at home? · Ask your doctor about light exercises that may help your back pain. Try to do light activity throughout the day. But make sure to take rests as needed. Find a comfortable position for rest, but don't stay in one position for too long. Avoid activities that cause pain. · To apply heat, put a warm water bottle, a heating pad set on low, or a warm cloth on your back. Do not go to sleep with a heating pad on your skin. · Put ice or a cold pack on your back for 10 to 20 minutes at a time. Put a thin cloth between the ice and your skin. · If the doctor gave you a prescription medicine for pain, take it as prescribed. · If you are not taking a prescription pain medicine, ask your doctor if you can take an over-the-counter pain medicine, such as acetaminophen (Tylenol), ibuprofen (Advil, Motrin), or naproxen (Aleve). Read and follow all instructions on the label. Take pain medicines exactly as directed. · Do not take two or more pain medicines at the same time unless the doctor told you to. Many pain medicines have acetaminophen, which is Tylenol. Too much acetaminophen (Tylenol) can be harmful. · To prevent future back pain, do exercises to stretch and strengthen your back and stomach. Learn how to use good posture, safe lifting techniques, and proper body mechanics. When should you call for help? Call 911 anytime you think you may need emergency care. For example, call if:    · You are unable to move a leg at all.   [facility] your doctor now or seek immediate medical care if:    · You have new or worse symptoms in your legs or buttocks.  Symptoms may include:  ? Numbness or tingling. ? Weakness. ? Pain.     · You lose bladder or bowel control.    Watch closely for changes in your health, and be sure to contact your doctor if:    · You are not getting better as expected. Where can you learn more? Go to http://delgado-carlota.info/. Enter C610 in the search box to learn more about \"Sacroiliac Joint Pain: Care Instructions. \"  Current as of: June 26, 2019  Content Version: 12.2  © 4880-6955 [employer]. Care instructions adapted under license by [employer] (which disclaims liability or warranty for this information). If you have questions about a medical condition or this instruction, always ask your healthcare professional. [address] any warranty or liability for your use of this information.

## 2020-01-09 ENCOUNTER — DOCUMENTATION ONLY (OUTPATIENT)
Dept: ORTHOPEDIC SURGERY | Age: 45
End: 2020-01-09

## 2020-01-09 ENCOUNTER — TELEPHONE (OUTPATIENT)
Dept: ORTHOPEDIC SURGERY | Age: 45
End: 2020-01-09

## 2020-01-09 NOTE — TELEPHONE ENCOUNTER
I called and spoke to the pt. The pt was identified using 2 pt identifiers. She was made aware of the provider's recommendations. She verbalized understanding and wanted to express concern to the provider. She states that her pain is now worse since the injection and is traveling down her left thigh and will not go away. Pt aware that message will be sent to provider for review. She will be contacted once provider has responded. She verbalized understanding and has no questions at this time.

## 2020-01-09 NOTE — PROGRESS NOTES
Patient called after hours for worsening LBP now going down her entire LLE. Pain w/WB, unable to get any relief. Had TPI in office yesterday, not helping. Taking Ibuprofen and muscle relaxant w/o benefit. Tried heat/ice. Advised to go to ED for eval, may need new MRI for acute radiclopathy. Last MR in '16 mod stenosis L3/4.

## 2020-01-09 NOTE — TELEPHONE ENCOUNTER
Patient mom Milena Lyman on Emergency Contact ( no hipaa in Epic yet ) called for Juanis Gutierrez. Mrs. Lluvia Diaz said that the patient was seen yesterday by Juanis Gutierrez and is not getting any better. Mrs. Lluvia Diaz is requesting to speak with Juanis Gutierrez or a Nurse. Mrs. Lluvia Diaz and the patient can be reached at   [facility] # [phone].

## 2020-01-09 NOTE — TELEPHONE ENCOUNTER
Attempted to contact the pt on the [phone] number again and there was no answer. I called the other number that was previously used for the pt at [phone]. Her mother, Chela, answered the phone. She stated that the pt finally fell asleep. The asked to take a message for the pt. The list of recommendations were provided. She stated that the pt does have the salonpas patches. She was also reminded to contact the office on Monday if the pain does not subside. She verbalized understanding and has no questions at this time.

## 2020-01-09 NOTE — TELEPHONE ENCOUNTER
She may be having a steroid flare so she needs to increase her water intake. It should settle down in the next day or two.

## 2020-01-09 NOTE — TELEPHONE ENCOUNTER
I called and spoke to Ms. Hines. The pt was identified using 2 pt identifiers. She was made aware of the instructions to increase her water in-take. The pt states that she drinks water all day and doesn't know how much water she can drink. She states that the pain is keeping her from going downstairs in her home. Pt was reminded that she can go to the ER anytime if she feels like she needs to be seen. The pt verbalized understanding and states that the ER does nothing for her and she will not go. Message routed to provider for further review. Pt will be called back once provider has responded. Pt aware.

## 2020-01-09 NOTE — TELEPHONE ENCOUNTER
Attempted to contact the pt on [phone]. This number was given by her mother because the pt is not able to get down stairs to answer the phone. Was not able to reach the pt. No message left because there was no mail box set up. Also spoke to SUREKHA HOWARD about pt being able to take over the counter medications for her pain. She stated that the pt can take ibuprofen, tylenol, or salopas patches. She can use heat or ice. Massage may help as well.

## 2020-01-09 NOTE — TELEPHONE ENCOUNTER
She needs to give it a few days and if not better by Monday then she can call and we will schedule her for a Left SI joint injection.  (according to Dr Mary Rueda note)

## 2020-01-10 ENCOUNTER — TELEPHONE (OUTPATIENT)
Dept: ORTHOPEDIC SURGERY | Age: 45
End: 2020-01-10

## 2020-01-10 DIAGNOSIS — M54.16 LUMBAR RADICULOPATHY: ICD-10-CM

## 2020-01-10 DIAGNOSIS — M53.3 SCLEROSIS OF SACROILIAC JOINT: Primary | ICD-10-CM

## 2020-01-10 NOTE — TELEPHONE ENCOUNTER
Spoke w/ED last night. Looks like ED did not get a lumbar MRI. She was given Prednisone, Toradol, Dilaudid. I had them start Gabapentin. VO MRI lumbar spine- urgent    Acute left L3 radiculopathy  Hx of stenosis  Failed meds/injection    OK to add on for MRI review appt. w/me.

## 2020-01-10 NOTE — TELEPHONE ENCOUNTER
Patient was seen at the ED last night and spoke with CJ after hours. Mother states they were told to call this morning to see if they could schedule a follow up appointment with [facility] today or Monday. Please see if there are any available spots.   [phone]

## 2020-01-10 NOTE — TELEPHONE ENCOUNTER
Verbal order entered per Dr. Jerardo Romero as documented in message below: MRI L-spine stat due to Acute left L3 radiculopathy, Hx of stenosis  Failed meds/injection. Order given to Sabane Perches the referral coordinator for scheduling. Will add patient to schedule when MRI is scheduled.

## 2020-01-11 DIAGNOSIS — M53.3 SCLEROSIS OF SACROILIAC JOINT: Primary | ICD-10-CM

## 2020-01-11 RX ORDER — TIZANIDINE 4 MG/1
4 TABLET ORAL
Qty: 90 TAB | Refills: 0 | Status: SHIPPED | OUTPATIENT
Start: 2020-01-11

## 2020-01-11 NOTE — PROGRESS NOTES
Spoke w/patient on after hours line this am.  Reporting ongoing severe LBP radiating into L hip, difficulty walking . No new F/C/B/B changes. Recent ED visits x 4 noted for similar complaints. Had recent Prednisone, TPI, started on Gabapentin. Has upcoming MRI scheduled. REports compliance w/medication. Reports that she is out of muscle relaxers, Robaxin and Flexeril not helping. Advised to continue Gabapentin, will add Zanaflex. Cautioned about somnolence. Will f/u post MRI. Was in PM 2019 [facility] for chronic knee pain, on Nucynta. Advised pt that we would not be giving her opioids. Shin Silva Was requested by hospitalist to evaluate patient for intubation.    Intubation  Date/Time: 1/10/2019 12:28 AM  Performed by: Boris Ramirez DO  Authorized by: Boris Ramirez DO     Consent:     Consent obtained:  Emergent situation  Pre-procedure details:     Patient status:  Unresponsive    Mallampati score:  III    Pretreatment medications:  None    Paralytics:  Succinylcholine  Procedure details:     Preoxygenation:  BiPAP    CPR in progress: no      Intubation method:  Oral    Oral intubation technique:  Fiber optic (Glide scope)    Laryngoscope blade:  Mac 4    Tube size (mm):  7.5    Tube type:  Cuffed    Number of attempts:  2    Ventilation between attempts: yes      Cricoid pressure: yes      Tube visualized through cords: yes    Placement assessment:     Tube secured with:  ETT driver    Breath sounds:  Equal    Placement verification: CXR verification      CXR findings:  ETT in proper place  Post-procedure details:     Patient tolerance of procedure:  Tolerated well, no immediate complications           Boris Ramirez DO  01/10/19 0031

## 2020-01-13 NOTE — TELEPHONE ENCOUNTER
Spoke with patient Mom, she stated she just wanted to thank Dr. Stacia Posey for all her help. To also let Dr. Stacia Posey know that she had been admitted and the injection that she was discussing the patient have, the hospital is going to do it. She stated the patient has a herniated disc, and she just wanted to update Dr. Stacia Posey. No further actions needed at this time.

## 2021-04-15 NOTE — PROGRESS NOTES
Check in call made to patient. She reports things are going well - infant is back home after surgery and has a follow up today with cardiology. Denied any needs at this time, but appreciates the check in calls.     Plan: check in again in 3-4 weeks. Patient aware that she can reach out to care team if anything needed in the meantime.   Nursing Notes    Patient presents to the office today in follow-up. Patient rates her pain at 8/10 on the numerical pain scale. Reviewed medications with counts as follows:    Rx Date filled Qty Dispensed Pill Count Last Dose Short   Temazepam 15mg  07/26/17 30 17 Last night  No    opana 20mg ER 07/11/17 90 2 Today  No    nucynta 75mg  07/11/17 90 2 Today  No                             Comments: b/p elevated ; provider made aware. POC UDS was performed in office today    Any new labs or imaging since last appointment? NO    Have you been to an emergency room (ER) or urgent care clinic since your last visit? NO            Have you been hospitalized since your last visit? NO     If yes, where, when, and reason for visit? Have you seen or consulted any other health care providers outside of the [address] [place] [address]  since your last visit? YES     If yes, where, when, and reason for visit ? Psychiatry       HM deferred to pcp.

## 2021-09-01 ENCOUNTER — HOSPITAL ENCOUNTER (EMERGENCY)
Age: 46
Discharge: LEFT BEFORE DISCHARGE | End: 2021-09-01
Attending: EMERGENCY MEDICINE
Payer: MEDICAID

## 2021-09-01 VITALS
DIASTOLIC BLOOD PRESSURE: 40 MMHG | RESPIRATION RATE: 18 BRPM | HEIGHT: 69 IN | TEMPERATURE: 98.2 F | SYSTOLIC BLOOD PRESSURE: 138 MMHG | BODY MASS INDEX: 35.55 KG/M2 | WEIGHT: 240 LBS | HEART RATE: 115 BPM | OXYGEN SATURATION: 98 %

## 2021-09-01 LAB
COMMENT, HOLDF: NORMAL
SAMPLES BEING HELD,HOLD: NORMAL
TROPONIN I SERPL-MCNC: <0.05 NG/ML

## 2021-09-01 PROCEDURE — 84484 ASSAY OF TROPONIN QUANT: CPT

## 2021-09-01 PROCEDURE — 36415 COLL VENOUS BLD VENIPUNCTURE: CPT

## 2021-09-01 PROCEDURE — 75810000275 HC EMERGENCY DEPT VISIT NO LEVEL OF CARE

## 2021-09-01 PROCEDURE — 93005 ELECTROCARDIOGRAM TRACING: CPT

## 2021-09-01 NOTE — ED NOTES
Patient has calmed down and stated that she does not want to stay to be seen. She wants to go home and take shower and wait for nurse to call about son's condition.

## 2021-09-01 NOTE — ED TRIAGE NOTES
Patient out side ED. Having apparent panic attack as evidenced by tearfulness, repeating \"I need my son,\" and lying against family member. Son is currently being treated for \"fluid on his brain. \" patient reports stabbing chest pain 8/10. Skin warm w/ mild diaphoresis, normal color for ethnicity. Speech clear and appropriate. Unlabored breathing. Non-smoker.  Non-diabetic

## 2021-09-02 LAB
ATRIAL RATE: 106 BPM
CALCULATED P AXIS, ECG09: 43 DEGREES
CALCULATED R AXIS, ECG10: 19 DEGREES
CALCULATED T AXIS, ECG11: 14 DEGREES
DIAGNOSIS, 93000: NORMAL
P-R INTERVAL, ECG05: 142 MS
Q-T INTERVAL, ECG07: 346 MS
QRS DURATION, ECG06: 78 MS
QTC CALCULATION (BEZET), ECG08: 459 MS
VENTRICULAR RATE, ECG03: 106 BPM

## 2022-05-17 NOTE — TELEPHONE ENCOUNTER
[facility] ENCOUNTER  [facility]  NEW PATIENT VISIT        REFERRING PROVIDER:  Marti Butcher MD    CHIEF COMPLAINT: Lump of reconstructed RIGHT breast.     HISTORY OF PRESENT ILLNESS:   Christie is a 61 year old female she presents to the clinic today for consult regarding a lump at her RIGHT reconstructed breast. Lump is present at the inferior aspect of breast, per patient more towards rib cage. Noted this lump about 1.5 months ago. It has not increased in size. Lump is compressible and slightly TTP. She has no other breast complaints. Denies skin changes.     Patient underwent RT breast US on 4/26/22. US was negative impression revealed no suspicious right reconstructed breast findings. Clinical monitoring recommended.     Oncologic history is as listed below:   Christie  underwent bilateral total mastectomies with bilateral sentinel node biopsies and immediate reconstruction with LUIS MANUEL flaps (Dr. Glasgow) on 1/21/13 for stage IIA: pT2 pN0 cM0 left breast moderately differentiated invasive ductal carcinoma (3 cm) and intermediate grade extensive DCIS, ER/MO+, HER-2 negative. Right breast pathology revealed focal atypical ductal hyperplasia, columnar cell hyperplasia, intraductal papilloma formation, adenosis, apocrine metaplasia, and microcalcifications. No cancer was seen.      Radiation was not recommended. Oncotype testing not performed as patient requested to pursue chemotherapy with Dr. Butcher. Cycle 1 of dd AC given 3/6/2013. S/P 4 cycles followed by 4 cycles of taxanes (2 cycles of taxol followed by 2 cycles of Abraxane( Abraxane started because of allergic reaction to taxol). Meopause induced with Lupron (strong family history of thrombosis). Letrozole started 9/5/2013 and stopped on 11/2014 because of joint pain. Anastrozole was started on 12/1/2014 but then switched to Aromasin due to joint pain since 01/20/15.     No other new medical issues to report. She is trying to eat  Submitted pa for nucynta to [facility] healthier; trying to eat more proteins versus carbs. She doesn't smoke and drinks alcohol occasionally.    GENERAL BREAST HISTORY:  Age at menarche: 13  Last menstrual period: Age 51, stopped having periods after starting chemotherapy   : 2. Para: 2 (age 34 & 35).  Miscarriages: 0.  Abortions: 0.    OB History    Para Term  AB Living   2 2 0 0 0 0   SAB IAB Ectopic Molar Multiple Live Births   0 0 0 0 0 0   Obstetric Comments   Menarche at age 13    LMP 2013   Oral contraceptives x 9 years (stopped )      Age at first live birth: 27   Breast feedin child X 5 months     Age at first completed pregnancy: 27.  Breast-feeding history: First child for 6 months.  History of oral contraceptives: 9 years.  History of hormone replacement therapy or fertility assistance: None.  Previous breast biopsies or surgeries: Bilateral mastectomy for breast cancer in .  History of trauma to either breast: None  History of breast infections: None  Personal history of breast cancer: Yes, left breast cancer   Current Bra size: 38D    Ethnicity:  - Togolese, Marshallese, and Singaporean.      PAST MEDICAL HISTORY:  Past Medical History:   Diagnosis Date   • Acute bronchitis    • Allergy    • Arthritis    • Diabetes mellitus (CMS/HCC)     Type 2   • Esophageal reflux    • Essential (primary) hypertension    • Family history of malignant neoplasm of breast    • Heart murmur    • Left knee pain 17   • Malignant neoplasm (CMS/HCC) 2012    left breast cancer   • Mixed hyperlipidemia     strong family Hx of hyperlipidemia   • Other chronic pain     bilateral knees   • PONV (postoperative nausea and vomiting)    • Raynaud disease    • Unspecified sinusitis (chronic)         PAST SURGICAL HISTORY:  Past Surgical History:   Procedure Laterality Date   • Bilateral salpingoophorectomy  2014   • Breast surgery  2012    Core, left breast biopsy   • Bunionectomy Bilateral         •  Bunionectomy Right 2004   • Carpal tunnel release Bilateral         •  section, classic      x2   • Colorec canc scrn,colonoscopy not hi risk  2013    Screen, diverticula/ 10m yr recall    • Esophagogastroduodenoscopy transoral flex w/bx single or mult  2013    GERD, mild esophagitis. Normal gastric and esophagael biopsies    • Foot surgery Bilateral     extra bone removed as child   • Hb port/reservoir, implanted 1  2013    port removal   • Knee surgery Right 2018   • Left heart cath  17    Mild coronary artery disease, nl LVEF   • Mastectomy  2013    bilateral mastectomies   • Tonsillectomy and adenoidectomy      age 21   • Trigger finger release Bilateral    • Tubal ligation  1997       MEDICATIONS:  Current Outpatient Medications   Medication Sig Dispense Refill   • Omega-3 Fatty Acids (FISH OIL PO) Take by mouth daily.     • COLLAGEN PO Take by mouth daily. Powder     • NIACIN PO Take by mouth daily.     • tiZANidine (ZANAFLEX) 4 MG tablet Take 1-3 tablets by mouth every 8 hours as needed (muscle stiffness/pain). 270 tablet 1   • DULoxetine (Cymbalta) 60 MG capsule Take 1 capsule by mouth 2 times daily. 180 capsule 3   • Trulicity 1.5 MG/0.5ML pen-injector INJECT THE CONTENTS OF 1  PEN SUBCUTANEOUSLY EVERY 7  DAYS 6 mL 3   • metFORMIN (GLUCOPHAGE-XR) 750 MG 24 hr tablet TAKE 2 TABLETS BY MOUTH  DAILY WITH BREAKFAST 180 tablet 3   • losartan (COZAAR) 50 MG tablet TAKE 1 TABLET BY MOUTH  DAILY 90 tablet 3   • levothyroxine 50 MCG tablet TAKE 1 TABLET BY MOUTH  DAILY 90 tablet 3   • rosuvastatin (CRESTOR) 40 MG tablet TAKE 1 TABLET BY MOUTH  DAILY 90 tablet 3   • blood glucose (ONE TOUCH ULTRA TEST) test strip Use to test blood sugar levels once daily. 100 each 3   • Insulin Pen Needle (B-D U/F PEN NEEDLE) 31G X 5 MM Misc USE ONCE DAILY 90 each 1   • Insulin Pen Needle 31G X 5 MM Misc Victoza Pen Needles, test 1 time daily, dx E11.9 90 each 1   • Blood Glucose  Monitoring Suppl (ONE TOUCH ULTRA 2) w/Device Kit Use to test blood sugar levels once daily. 1 kit 0   • ONETOUCH DELICA LANCETS 33G Misc Use to test blood sugar levels once daily. 100 each 3   • aspirin 81 MG chewable tablet Chew 81 mg by mouth daily (at noon).      • MISC NATURAL PRODUCTS OT Take 1 tablet by mouth daily (at noon). Tumeric daily     • Cinnamon 500 MG CAPS Take 1 capsule by mouth daily (at noon).      • Cholecalciferol (VITAMIN D-3) 5000 UNITS TABS Take 1 tablet by mouth daily (at noon).        No current facility-administered medications for this visit.       ALLERGIES/SENSITIVITIES:  ALLERGIES:   Allergen Reactions   • Dog Dander Other (See Comments)     Congestion, stuffiness   • Mold   (Environmental) Other (See Comments)     Congestion, stuffiness   • Seasonal Other (See Comments)     Congestion, stuffiness   • Tomato GI UPSET     Heart burn       SOCIAL HISTORY:  Marital Status:   Employment:    Caffeine intake: 2-3 daily    Social History     Tobacco Use   • Smoking status: Former Smoker     Packs/day: 0.50     Years: 5.00     Pack years: 2.50     Types: Cigarettes     Quit date: 1987     Years since quittin.5   • Smokeless tobacco: Never Used   Substance Use Topics   • Alcohol use: Yes     Alcohol/week: 0.0 standard drinks     Comment: seldom, 2-4 drinks   • Drug use: No       FAMILY HISTORY:  Family History   Problem Relation Age of Onset   • Cancer Mother 48        Breast & bone   • Blood Disorder Mother         PE   • Cancer Sister 48        Breast, BRCA negative in    • Early death Sister 40        P.E.   • Heart disease Father    • High cholesterol Father    • Blood Disorder Sister         clotting disorder   • Blood Disorder Sister         DVT related to mediport   • Cancer, Breast Paternal Cousin         unknown age       3 brothers, 4 sisters. 4 maternal aunts, 1 maternal uncles, 1 paternal aunts, 1 paternal uncles.    REVIEW OF  SYSTEMS:  Performed by office staff and personally reviewed with patient.  Constitutional: The patient has no fevers, chills, hot flashes, or recent weight gain or loss.  Neurological: No headaches, dizziness, or seizures.  HEENT: No dentures, no trouble swallowing, not hard of hearing, no hearing aids. No double vision or blurred vision. No history of glaucoma. Wears contacts, last saw eye MD in 2021.  Cardiac: No new chest pain or pressure. No waking at night short of breath. The patient has never had a heart attack, no palpitations or irregular rhythm/murmurs.  Pulmonary: No new shortness of breath, cough or sputum production. No leg swelling, patient can walk a block/take a flight of stairs.  Gastrointestinal: No nausea, vomiting, abdominal pain, change in bowel habits or blood in the stool. No acid reflux. Last colonoscopy in 2013, occasional nausea, acid reflux.   Genitourinary: No burning on urination. No blood in the urine. No incontinence.  Musculoskeletal: No new bone pain, muscle weakness or joint discomfort. Last bone density test in 2021, back and joint pains.   Hematologic: No history of easy bruising. No bleeding problems. No history of blood clots.  Psychiatric: No history of anxiety or depression.  Menstrual History: The patient is post menopausal. Last pelvic exam in 2014, bilateral ovaries removed.     PHYSICAL EXAM:    VITAL SIGNS:    Vitals:    05/17/22 0952   BP: 126/70   Pulse: 82   Resp: 16   Temp: 97.8 °F (36.6 °C)       CONSTITUTIONAL:  Pleasant, healthy-appearing female, appears stated age, in no apparent physical distress.  HEENT:  Conjunctivae noninjected, anicteric, hearing grossly within normal  limits.  Oral mucosa moist.  NECK:  Trachea midline, no masses.  No palpable thyroid masses,  enlargement or tenderness.  LYMPH NODES:  No cervical, supraclavicular, or infraclavicular adenopathy.  No lymphedema.    BREAST/CHEST WALL: Reconstructed breasts are fairly symmetric and with grade 2  ptosis. Well healed scars bilaterally. Old nipple areolar tattoos present. There is no skin erythema, edema, retraction, or dimpling. Bilateral reconstructed nipples present. Breasts are minimally dense and minimally to moderately nodular in a macronodular pattern. There are no discrete masses or axillary lymphadenopathy bilaterally     MUSCULOSKELETAL:  Normal gait and balance.  No clubbing, cyanosis or  asymmetry.  Normal muscle strength and tone.  Full range of motion  bilateral upper extremities.  No spinal tenderness.  SKIN:  No rash, lesions or nodules.  NEUROLOGICAL:  Extraocular movements normal.  Normal sensation to touch.  PSYCHIATRIC:  Alert and oriented x3.  Judgment and insight normal.  Recent  and remote memory intact.      RADIOGRAPHIC IMAGING:  I personally reviewed the relevant images for this patient and concur with the radiologist's impression.    EXAM:  US BREAST LIMITED 1-3 QUADRANTS RIGHT     DATE OF EXAM:  4/26/2022 7:27 AM.     COMPARISON EXAMS:  None.     CLINICAL INDICATION:  61-year-old female with right reconstructed breast  pain and lump. Patient has a history of left breast cancer with bilateral  mastectomies and LUIS MANUEL flap reconstruction.     TECHNIQUE:  US BREAST LIMITED 1-3 QUADRANTS RIGHT. Targeted ultrasound with  real-time grayscale and color Doppler was performed in the targeted region  of patient's pain/lump     FINDINGS: Ultrasound was performed both by the technologist and the  interpreting radiologist. No solid or sonographically suspicious findings.     IMPRESSION:  No suspicious right reconstructed breast findings     RECOMMENDATIONS:  Clinical monitoring.     BI-RADS:  1 - Negative.        PATHOLOGY:    I personally reviewed the relevant pathology for this patient which is summarized as follows:    None      IMPRESSION:  Christie Cruz is a 61 year old female with:     1. History of Left T2, N0, M0, stage II breast carcinoma: Invasive ductal moderately differentiated 3 cm  with no angiolymphatic invasion ER and % positive and is HER-2/michael negative.  2. History of right breast ADH and papilloma. Negative for carcinoma in specimen.  3. S/P bilateral mastectomies with sentinel node biopsy and there was negative surgical margins 1/21/2013.   4. S/p Adjuvant chemotherapy: Oncotype testing was not done as patient wished to pursue chemotherapy. Status post 4 cycles of dose dense AC.  Followed by 4 cycles of taxanes (2 cycles of Taxol with infusion reaction during second infusion followed by 2 cycles of Abraxane).   5. Adjuvant endocrine therapy: Due to strong family history of thromboembolism and it was decided to induce chemical menopause and use aromatase inhibitors.    6. S/P BSO 6/2014.    7. Letrozole started 9/5/2013 and stopped 11/2014 because of her joint pain.    8. Anastrozole was started 12/2014 and was changed to exemestane 1/2015 because of bone pain.  Patient was told to remain physically active.  Patient 1 week drug holiday 12/2015 with improvement of symptoms.  She stopped 3/2018 because of body aches, depression and mood swings.  She decided against any further adjuvant endocrine therapy, she is aware of the recommended 5 years of therapy.   8. Stable clinical breast exam with no evidence of local or regional recurrence. No abnormal palpable findings on clinical exam. Negative RIGHT breast us. Patient reassured of imaging and exam findings.     PLAN:  1. RTC in 1 year for CBE.   2. Monthly self breast exams.   3. Continue risk reduction with healthy eating, maintain healthy BMI, regular exercise, and avoidance of tobacco and alcohol.  4. Resume annual follow up with Dr. Butcher.     The patient indicated understanding of the diagnosis and agreed with the plan of care. They are encouraged to call our office with any additional questions or concerns.    45 minutes were spent included face to face with the patient in education, counseling and coordination of care, medical  record/imaging review and documenting clinical information for today's visit.    I wish to thank Safia Fisher MD for the privilege of being able to participate in the evaluation and care of this patient.    Zeynep Torres, RN, MSN, [facility].   05/17/22    Collaborating MD: Dr. Huong Cantrell    Carbon Copy: Marti Butcher MD Barbara Grzaba-Gracz, MD    Every effort was made to ensure an accurate, comprehensive note and summary of your care.  Sometimes there is a typo, transcription mistype and information may have been interpreted differently than was intended.   If there are any questions regarding a major correction that is needed, please do not hesitate to contact me.